# Patient Record
Sex: MALE | Race: WHITE | NOT HISPANIC OR LATINO | Employment: OTHER | URBAN - METROPOLITAN AREA
[De-identification: names, ages, dates, MRNs, and addresses within clinical notes are randomized per-mention and may not be internally consistent; named-entity substitution may affect disease eponyms.]

---

## 2020-07-20 ENCOUNTER — OFFICE VISIT (OUTPATIENT)
Dept: ORTHOPEDIC SURGERY | Facility: CLINIC | Age: 85
End: 2020-07-20

## 2020-07-20 VITALS — HEIGHT: 69 IN | BODY MASS INDEX: 26.36 KG/M2 | WEIGHT: 178 LBS | TEMPERATURE: 97.8 F

## 2020-07-20 DIAGNOSIS — M19.019 SHOULDER ARTHRITIS: ICD-10-CM

## 2020-07-20 DIAGNOSIS — M25.511 BILATERAL SHOULDER PAIN, UNSPECIFIED CHRONICITY: Primary | ICD-10-CM

## 2020-07-20 DIAGNOSIS — M25.512 BILATERAL SHOULDER PAIN, UNSPECIFIED CHRONICITY: Primary | ICD-10-CM

## 2020-07-20 PROCEDURE — 73030 X-RAY EXAM OF SHOULDER: CPT | Performed by: ORTHOPAEDIC SURGERY

## 2020-07-20 PROCEDURE — 99204 OFFICE O/P NEW MOD 45 MIN: CPT | Performed by: ORTHOPAEDIC SURGERY

## 2020-07-20 RX ORDER — MELOXICAM 15 MG/1
15 TABLET ORAL ONCE
Status: CANCELLED | OUTPATIENT
Start: 2020-09-10 | End: 2020-07-20

## 2020-07-20 RX ORDER — ACETAMINOPHEN 325 MG/1
1000 TABLET ORAL ONCE
Status: CANCELLED | OUTPATIENT
Start: 2020-09-10 | End: 2020-07-20

## 2020-07-20 RX ORDER — CEFAZOLIN SODIUM 2 G/100ML
2 INJECTION, SOLUTION INTRAVENOUS ONCE
Status: CANCELLED | OUTPATIENT
Start: 2020-09-10 | End: 2020-07-20

## 2020-07-20 RX ORDER — PREGABALIN 75 MG/1
150 CAPSULE ORAL ONCE
Status: CANCELLED | OUTPATIENT
Start: 2020-09-10 | End: 2020-07-20

## 2020-07-20 NOTE — PROGRESS NOTES
Patient: Josue Rothman    YOB: 1935    Medical Record Number: [id number]    Chief Complaints:  Bilateral shoulder pain and weakness    History of Present Illness:     85 y.o. male patient who presents with a complaint of bilateral shoulder pain.  Both bother him but the left is worse.  He reports that the symptoms first started 5 or 6 years ago.  He previously saw Dr. Motley.  He had shots which did help transiently.  Pain is moderate, constant and aching.  He reports difficulty with routine daily activities at this point.  The symptoms have been getting progressively worse.  He denies any alleviating factors.  He denies any shooting pain down the arm, distal weakness, numbness or paresthesias.  He comes to me wanting to discuss the option of an arthroplasty.  He has a granddaughter that is a nurse practitioner.  She participated in the evaluation via phone.    Allergies:   Allergies   Allergen Reactions   • Penicillins Rash       Home Medications:    Current Outpatient Medications:   •  atorvastatin (LIPITOR) 40 MG tablet, , Disp: , Rfl:   •  celecoxib (CeleBREX) 200 MG capsule, , Disp: , Rfl:   •  co-enzyme Q-10 30 MG capsule, Take 30 mg by mouth 3 (Three) Times a Day., Disp: , Rfl:   •  Cod Liver Oil capsule, Take  by mouth., Disp: , Rfl:   •  ELIQUIS 5 MG tablet tablet, , Disp: , Rfl:   •  finasteride (PROSCAR) 5 MG tablet, , Disp: , Rfl:   •  gabapentin (NEURONTIN) 300 MG capsule, , Disp: , Rfl:   •  Ibuprofen-diphenhydrAMINE HCl 200-25 MG capsule, Take  by mouth., Disp: , Rfl:     Past Medical History:   Diagnosis Date   • Afib (CMS/HCC) 12/02/2018   • Hyperlipidemia    • Renal disorder        Past Surgical History:   Procedure Laterality Date   • BACK SURGERY  2009    RODS & SCREWS   • HERNIA REPAIR     • KIDNEY STONE SURGERY         Social History     Occupational History   • Not on file   Tobacco Use   • Smoking status: Never Smoker   • Smokeless tobacco: Never Used   Substance and  "Sexual Activity   • Alcohol use: Yes     Alcohol/week: 3.0 standard drinks     Types: 1 Glasses of wine, 1 Cans of beer, 1 Shots of liquor per week     Comment: VERY SELDOM   • Drug use: Defer   • Sexual activity: Defer      Social History     Social History Narrative   • Not on file       History reviewed. No pertinent family history.    Review of Systems:      Constitutional: Denies fever, shaking or chills   Eyes: Denies change in visual acuity   HEENT: Denies nasal congestion or sore throat   Respiratory: Denies cough or shortness of breath   Cardiovascular: Denies chest pain or edema  Endocrine: Denies tremors, palpitations, intolerance of heat or cold, polyuria, polydipsia.  GI: Denies abdominal pain, nausea, vomiting, bloody stools or diarrhea  : Denies frequency, urgency, incontinence, retention, or nocturia.  Musculoskeletal: Denies numbness, tingling or loss of motor function except as above  Integument: Denies rash, lesion or ulceration   Neurologic: Denies headache or focal weakness, deficits  Heme: Denies spontaneous or excessive bleeding, epistaxis, hematuria, melena, fatigue, enlarged or tender lymph nodes.      All other pertinent positives and negatives as noted above in HPI.    Physical Exam:   85 y.o. male  Vitals:    07/20/20 1104   Temp: 97.8 °F (36.6 °C)   Weight: 80.7 kg (178 lb)   Height: 175.2 cm (68.98\")     General:  Patient is awake and alert.  Appears in no acute distress or discomfort.    Psych:  Affect and demeanor are appropriate.    Eyes:  Conjunctiva and sclera appear grossly normal.  Eyes track well and EOM seem to be intact.    Ears:  No gross abnormalities.  Hearing adequate for the exam.    Cardiovascular:  Regular rate and rhythm.    Lungs:  Good chest expansion.  Breathing unlabored.    Spine:  Neck appears grossly normal.  No palpable masses or adenopathy.  Good motion.  Spurling's maneuver is negative for any shoulder or arm symptoms.    Extremities:  Left shoulder is " examined.  Skin is benign.  No obvious gross abnormalities.  No palpable masses or adenopathy.  Moderate tenderness noted over anterior glenohumeral joint and rotator interval.  Motion is limited and uncomfortable.   He has crepitus with range of motion.  When I raise the arm up for him, he can briefly maintain it.  His passive motion is not quite full but is much better than his active motion.  No instability.  4 out of 5 strength with resistive testing of elevation in the scapular plane and external rotation.  Negative Hornblower's.  Good motor function in the lower arm and hand including wrist flexion, extension,  and pinch.  Intact sensation.  Palpable radial pulse.  Brisk capillary refill.  Good skin turgor.    Right upper extremity: The right side was only briefly examined.  Skin is benign.  Moderate anterior tenderness.  He has limited motion and pain and weakness with both abduction and elevation.  Motion on the right seems to be a little better than the left but he does have crepitus.         Radiology:  AP, scapular Y, and axillary views of both shoulders are ordered by myself and reviewed to evaluate the patient's complaint.  No comparison films are immediately available.  The x-rays show severe bilateral rotator cuff tear arthropathy on the right with an absent acromiohumeral interval.  He has advanced rotator cuff tear arthropathy on the left as well.  On the left, he appears to have superior glenoid erosion.    Assessment/Plan:  Bilateral shoulder rotator cuff tear arthropathy    We discussed treatment options in detail.  I had a long conversation with both him and his granddaughter.  We discussed continued conservative care versus an arthroplasty.  He is of the mindset that he wants to go ahead and pursue the replacement.  The risk, benefits and alternatives were discussed.  He is taking a trip to the [place] in the near future.  He wants to get his shoulder replaced when he comes back.  We  should be able to work with his schedule.  He will need to get medical clearance preoperatively and permission to come off of the Eliquis.  We will see him back for a preoperative visit.    Jayden Holbrook MD    07/20/2020    CC to Fredo Mevlin MD

## 2020-07-21 ENCOUNTER — TELEPHONE (OUTPATIENT)
Dept: ORTHOPEDIC SURGERY | Facility: CLINIC | Age: 85
End: 2020-07-21

## 2020-07-21 NOTE — TELEPHONE ENCOUNTER
Have spoke with the patient.  He is point to be scheduled for a reverse total shoulder arthroplasty in August.  Does have a history of A. fib which was diagnosed in [facility].  Patient has been on Eliquis since 2018.  He sees Dr. Burnett with [facility] and was last seen in October of last year and was stable at that time and told to come back in a year.  He just saw his primary care physician last week for his annual exam.  Patient reports that Dr. Melvin is the one who refills his Eliquis.  I have contacted his PCPs office and asked him to check with Dr. Melvin to see if the patient is okay to proceed with surgery and if okay to discontinue the Eliquis and for how long before the surgery.  I will get back with the patient once I have heard from Dr. Vanessa office

## 2020-07-21 NOTE — TELEPHONE ENCOUNTER
----- Message from Jayden Holbrook MD sent at 7/20/2020 12:02 PM EDT -----  He wants to get scheduled for a reverse arthroplasty.  I am going to have [facility] call him but he will need to get medical clearance and permission to come off of his Eliquis.  Can you call him about getting clearance?  Thanks.

## 2020-07-31 ENCOUNTER — TELEPHONE (OUTPATIENT)
Dept: ORTHOPEDIC SURGERY | Facility: CLINIC | Age: 85
End: 2020-07-31

## 2020-07-31 NOTE — TELEPHONE ENCOUNTER
Have notified the patient that we have received medical clearance from his PCP.  Letter was also sent to the patient.  It says that he is to stop his Eliquis 24 to 48 hours prior to surgery.  I did advise the patient that Dr. Holbrook would prefer that he hold the Eliquis 48 hours prior to surgery.

## 2020-07-31 NOTE — PROGRESS NOTES
It looks like he is scheduled to see another surgeon for his shoulder.  Does he want to wait on scheduling the surgery?

## 2020-08-03 ENCOUNTER — TELEPHONE (OUTPATIENT)
Dept: ORTHOPEDIC SURGERY | Facility: CLINIC | Age: 85
End: 2020-08-03

## 2020-08-03 NOTE — TELEPHONE ENCOUNTER
Have spoken with the patient. Have discussed with him about the appt with Dr. Jiang.  Patient states that he has cancelled that appt and wants [facility] to do his surgery. Patient is ready to schedule at next available time.  AMB

## 2020-08-03 NOTE — TELEPHONE ENCOUNTER
----- Message from Jayden Holbrook MD sent at 7/31/2020  2:18 PM EDT -----      ----- Message -----  From: Kassy Torres RN  Sent: 7/31/2020   1:48 PM EDT  To: Jayden Holbrook MD    Here is medical clearance for surgery

## 2020-08-04 ENCOUNTER — TELEPHONE (OUTPATIENT)
Dept: ORTHOPEDIC SURGERY | Facility: CLINIC | Age: 85
End: 2020-08-04

## 2020-08-04 NOTE — TELEPHONE ENCOUNTER
Patient is returning your call he has been out of town and he has a few questions for you. Please advise.cld

## 2020-08-05 NOTE — TELEPHONE ENCOUNTER
Call returned to the patient.  He is wanting to go ahead and get his surgery set up at the next available opening.  Will have him contact the surgery scheduler to set up the surgery and preadmission testing.  Patient does have an appointment with Dr. Holbrook on August 17 and have recommended that he go ahead and keep that appointment to discuss the surgery in more detail unless his surgery is scheduled before that appointment time

## 2020-08-17 ENCOUNTER — OFFICE VISIT (OUTPATIENT)
Dept: ORTHOPEDIC SURGERY | Facility: CLINIC | Age: 85
End: 2020-08-17

## 2020-08-17 VITALS — HEIGHT: 69 IN | BODY MASS INDEX: 26.81 KG/M2 | WEIGHT: 181 LBS | TEMPERATURE: 97.8 F

## 2020-08-17 DIAGNOSIS — Z01.818 PRE-OP EVALUATION: Primary | ICD-10-CM

## 2020-08-17 PROBLEM — M19.019 SHOULDER ARTHRITIS: Status: ACTIVE | Noted: 2020-08-17

## 2020-08-17 PROCEDURE — S0260 H&P FOR SURGERY: HCPCS | Performed by: ORTHOPAEDIC SURGERY

## 2020-08-17 RX ORDER — DILTIAZEM HYDROCHLORIDE 120 MG/1
120 CAPSULE, EXTENDED RELEASE ORAL EVERY MORNING
COMMUNITY
Start: 2020-05-18

## 2020-08-17 RX ORDER — TAMSULOSIN HYDROCHLORIDE 0.4 MG/1
1 CAPSULE ORAL DAILY
COMMUNITY
Start: 2020-05-26

## 2020-08-17 NOTE — PROGRESS NOTES
History & Physical         Patient: Josue Rothman    YOB: 1935    Medical Record Number: [id number]    Chief Complaints:   Chief Complaint   Patient presents with   • Left Shoulder - Follow-up     History of Present Illness: 85 y.o. male comes in today of upcoming shoulder replacement surgery. Reports a long history of progressively worsening pain, motion loss, and dysfunction.  Describes current pain as severe.  Has failed prolonged conservative treatment.  Denies any new complaints or issues.    Allergies:   Allergies   Allergen Reactions   • Penicillins Rash       Medications:   Home Medications:    Current Outpatient Medications:   •  atorvastatin (LIPITOR) 40 MG tablet, , Disp: , Rfl:   •  celecoxib (CeleBREX) 200 MG capsule, , Disp: , Rfl:   •  co-enzyme Q-10 30 MG capsule, Take 30 mg by mouth 3 (Three) Times a Day., Disp: , Rfl:   •  Cod Liver Oil capsule, Take  by mouth., Disp: , Rfl:   •  dilTIAZem XR (Dilt-XR) 120 MG 24 hr capsule, TAKE 1 CAPSULE EVERY DAY, Disp: , Rfl:   •  ELIQUIS 5 MG tablet tablet, , Disp: , Rfl:   •  finasteride (PROSCAR) 5 MG tablet, , Disp: , Rfl:   •  gabapentin (NEURONTIN) 300 MG capsule, , Disp: , Rfl:   •  Ibuprofen-diphenhydrAMINE HCl 200-25 MG capsule, Take  by mouth., Disp: , Rfl:   •  tamsulosin (FLOMAX) 0.4 MG capsule 24 hr capsule, TAKE 1 CAPSULE AT BEDTIME, Disp: , Rfl:     Past Medical History:   Diagnosis Date   • Afib (CMS/HCC) 12/02/2018   • Hyperlipidemia    • Renal disorder           Past Surgical History:   Procedure Laterality Date   • BACK SURGERY  2009    RODS & SCREWS   • HERNIA REPAIR     • KIDNEY STONE SURGERY            Social History     Occupational History   • Not on file   Tobacco Use   • Smoking status: Never Smoker   • Smokeless tobacco: Never Used   Substance and Sexual Activity   • Alcohol use: Yes     Alcohol/week: 3.0 standard drinks     Types: 1 Glasses of wine, 1 Cans of beer, 1 Shots of liquor per week     Comment: VERY  "SELDOM   • Drug use: Defer   • Sexual activity: Defer      Social History     Social History Narrative   • Not on file        History reviewed. No pertinent family history.    Review of Systems:     Constitutional:  Denies fever, shaking or chills   Eyes:  Denies change in visual acuity   HEENT:  Denies nasal congestion or sore throat   Respiratory:  Denies cough or shortness of breath   Cardiovascular:  Denies chest pain or edema   GI:  Denies abdominal pain, nausea, vomiting, bloody stools or diarrhea   Musculoskeletal:  Denies numbness tingling or loss of motor function except as outlined above in history of present illness.  Integument:  Denies rash, lesion or ulceration   Neurologic:  Denies headache or focal weakness, deficits      All other pertinent positives and negatives as noted above in HPI.    Physical Exam: 85 y.o. male    Vitals:    08/17/20 0815   Temp: 97.8 °F (36.6 °C)   TempSrc: Temporal   Weight: 82.1 kg (181 lb)   Height: 175.3 cm (69\")     General:  Patient is awake and alert.  Appears in no acute distress or discomfort.    Psych:  Affect and demeanor are appropriate.    Eyes:  Conjunctiva and sclera appear grossly normal.  Eyes track well and EOM seem to be intact.    Ears:  No gross abnormalities.  Hearing adequate for the exam.    Cardiovascular:  Regular rate and rhythm.    Lungs:  Good chest expansion.  Breathing unlabored.    Lymph:  No palpable adenopathy about neck or axilla.    Neck:  Supple.  Normal ROM.  Negative Spurling's for shoulder or arm pain.    Left upper extremity:  Skin benign and intact without evidence for swelling, masses or atrophy.  No palpable masses.  Moderate anterior tenderness.  Shoulder motion remains limited and painful.  Passive motion is better than active.  He can briefly maintain his arm in an elevated position when I raise it for him.  No evident instability or apprehension.  Weakness with elevation in the scapular plane and external rotation.  Deltoid " fires on exam.  Good strength in the lower arm and hand.  Intact sensation throughout the arm and hand palpable radial pulse with brisk cap refill.    Diagnostic Tests:  Lab Results   Component Value Date    GLUCOSE 97 05/15/2015    CALCIUM 9.7 01/09/2020     01/09/2020    K 4.6 01/09/2020    CO2 27 01/09/2020     01/09/2020    BUN 19 01/09/2020    CREATININE 0.9 01/09/2020    BCR 22.0 (H) 01/09/2020     Lab Results   Component Value Date    WBC 4.78 05/15/2015    HGB 14.6 05/15/2015    HCT 42.4 05/15/2015    MCV 90.2 05/15/2015     05/15/2015     No results found for: INR, PROTIME    Imaging:  Previous x-rays of the shoulder are reviewed.  He has advanced rotator cuff tear arthropathy.    Assessment:  Left shoulder rotator cuff tear arthropathy    Plan: We had a thorough discussion regarding the risks, benefits and alternatives to an arthroplasty and non-surgical management versus surgery.  I explained that surgical risks include infection, hematoma, hardware related complications including failure of fixation, loosening, fracture, persistent pain and/or loss of motion, iatrogenic nerve and/or blood vessel injury resulting in permanent weakness, numbness or dysfunction, DVT, PE, positioning related neuropraxia, and anesthesia related complications resulting in death.  We discussed the indication for a reverse as opposed to a standard total shoulder and the risks inherent to the reverse including notching, glenoid loosening, instability, and traction related neuropraxia, any of which could result in persistent pain or problems requiring further surgery.  Lastly, we discussed the rehab and all that will be expected of the patient post operatively to ensure an optimal outcome.  The patient voiced understanding of the risks, benefits, and alternative forms of treatment that were discussed and the patient consents to proceed with the reverse arthroplasty.  Denies any history of metal allergy.  Denies  any personal or family history of blood clots.    Jayden Holbrook MD  08/17/20

## 2020-08-31 ENCOUNTER — TRANSCRIBE ORDERS (OUTPATIENT)
Dept: PREADMISSION TESTING | Facility: HOSPITAL | Age: 85
End: 2020-08-31

## 2020-08-31 DIAGNOSIS — Z01.818 OTHER SPECIFIED PRE-OPERATIVE EXAMINATION: Primary | ICD-10-CM

## 2020-09-02 ENCOUNTER — APPOINTMENT (OUTPATIENT)
Dept: PREADMISSION TESTING | Facility: HOSPITAL | Age: 85
End: 2020-09-02

## 2020-09-02 VITALS
BODY MASS INDEX: 26.41 KG/M2 | HEIGHT: 69 IN | SYSTOLIC BLOOD PRESSURE: 155 MMHG | TEMPERATURE: 97.7 F | RESPIRATION RATE: 16 BRPM | WEIGHT: 178.31 LBS | HEART RATE: 61 BPM | OXYGEN SATURATION: 96 % | DIASTOLIC BLOOD PRESSURE: 72 MMHG

## 2020-09-02 DIAGNOSIS — M19.019 SHOULDER ARTHRITIS: ICD-10-CM

## 2020-09-02 LAB
ANION GAP SERPL CALCULATED.3IONS-SCNC: 9 MMOL/L (ref 5–15)
BUN SERPL-MCNC: 15 MG/DL (ref 8–23)
BUN/CREAT SERPL: 17.2 (ref 7–25)
CALCIUM SPEC-SCNC: 9.9 MG/DL (ref 8.6–10.5)
CHLORIDE SERPL-SCNC: 103 MMOL/L (ref 98–107)
CO2 SERPL-SCNC: 27 MMOL/L (ref 22–29)
CREAT SERPL-MCNC: 0.87 MG/DL (ref 0.76–1.27)
DEPRECATED RDW RBC AUTO: 46.6 FL (ref 37–54)
ERYTHROCYTE [DISTWIDTH] IN BLOOD BY AUTOMATED COUNT: 13.8 % (ref 12.3–15.4)
GFR SERPL CREATININE-BSD FRML MDRD: 83 ML/MIN/1.73
GLUCOSE SERPL-MCNC: 99 MG/DL (ref 65–99)
HCT VFR BLD AUTO: 42.2 % (ref 37.5–51)
HGB BLD-MCNC: 14.6 G/DL (ref 13–17.7)
MCH RBC QN AUTO: 31.7 PG (ref 26.6–33)
MCHC RBC AUTO-ENTMCNC: 34.6 G/DL (ref 31.5–35.7)
MCV RBC AUTO: 91.7 FL (ref 79–97)
PLATELET # BLD AUTO: 156 10*3/MM3 (ref 140–450)
PMV BLD AUTO: 9.2 FL (ref 6–12)
POTASSIUM SERPL-SCNC: 4.2 MMOL/L (ref 3.5–5.2)
RBC # BLD AUTO: 4.6 10*6/MM3 (ref 4.14–5.8)
SODIUM SERPL-SCNC: 139 MMOL/L (ref 136–145)
WBC # BLD AUTO: 5.33 10*3/MM3 (ref 3.4–10.8)

## 2020-09-02 PROCEDURE — 93005 ELECTROCARDIOGRAM TRACING: CPT

## 2020-09-02 PROCEDURE — A9270 NON-COVERED ITEM OR SERVICE: HCPCS | Performed by: ORTHOPAEDIC SURGERY

## 2020-09-02 PROCEDURE — 36415 COLL VENOUS BLD VENIPUNCTURE: CPT

## 2020-09-02 PROCEDURE — 85027 COMPLETE CBC AUTOMATED: CPT | Performed by: ORTHOPAEDIC SURGERY

## 2020-09-02 PROCEDURE — 80048 BASIC METABOLIC PNL TOTAL CA: CPT | Performed by: ORTHOPAEDIC SURGERY

## 2020-09-02 PROCEDURE — 63710000001 MUPIROCIN 2 % OINTMENT: Performed by: ORTHOPAEDIC SURGERY

## 2020-09-02 PROCEDURE — 93010 ELECTROCARDIOGRAM REPORT: CPT | Performed by: INTERNAL MEDICINE

## 2020-09-02 RX ORDER — NAPROXEN SODIUM 220 MG
220 TABLET ORAL 2 TIMES DAILY PRN
COMMUNITY
End: 2020-09-11 | Stop reason: HOSPADM

## 2020-09-02 NOTE — DISCHARGE INSTRUCTIONS
Take the following medications the morning of surgery:    DILTIAZEM XR       If you are on prescription narcotic pain medication to control your pain you may also take that medication the morning of surgery.    General Instructions:  • Do not eat solid food after midnight the night before surgery.  • You may drink clear liquids day of surgery but must stop at least one hour before your hospital arrival time. @ 0800 AM STOP DRINKING!!  • It is beneficial for you to have a clear drink that contains carbohydrates the day of surgery.  We suggest a 12 to 20 ounce bottle of Gatorade or Powerade for non-diabetic patients or a 12 to 20 ounce bottle of G2 or Powerade Zero for diabetic patients.     Clear liquids are liquids you can see through.  Nothing red in color.     Plain water                               Sports drinks  Sodas                                   Gelatin (Jell-O)  Fruit juices without pulp such as white grape juice and apple juice  Popsicles that contain no fruit or yogurt  Tea or coffee (no cream or milk added)  Gatorade / Powerade  G2 / Powerade Zero     • Patients who avoid smoking, chewing tobacco and alcohol for 4 weeks prior to surgery have a reduced risk of post-operative complications.  Quit smoking as many days before surgery as you can.  • Do not smoke, use chewing tobacco or drink alcohol the day of surgery.   • If applicable bring your C-PAP/ BI-PAP machine.  • Bring any papers given to you in the doctor’s office.  • Wear clean comfortable clothes.  • Do not wear contact lenses, false eyelashes or make-up.  Bring a case for your glasses.   • Bring crutches or walker if applicable.  • Remove all piercings.  Leave jewelry and any other valuables at home.  • Hair extensions with metal clips must be removed prior to surgery.  • The Pre-Admission Testing nurse will instruct you to bring medications if unable to obtain an accurate list in Pre-Admission Testing.        Preventing a Surgical Site  Infection:  • For 2 to 3 days before surgery, avoid shaving with a razor because the razor can irritate skin and make it easier to develop an infection.    • Any areas of open skin can increase the risk of a post-operative wound infection by allowing bacteria to enter and travel throughout the body.  Notify your surgeon if you have any skin wounds / rashes even if it is not near the expected surgical site.  The area will need assessed to determine if surgery should be delayed until it is healed.  • The night prior to surgery shower using a fresh bar of anti-bacterial soap (such as Dial) and clean washcloth.  Sleep in a clean bed with clean clothing.  Do not allow pets to sleep with you.  • Shower on the morning of surgery using a fresh bar of anti-bacterial soap (such as Dial) and clean washcloth.  Dry with a clean towel and dress in clean clothing.  • Ask your surgeon if you will be receiving antibiotics prior to surgery.  • Make sure you, your family, and all healthcare providers clean their hands with soap and water or an alcohol based hand  before caring for you or your wound.    Day of surgery:09- ARRIVE @ 0900 AM REPORT TO THE MAIN OR   Your arrival time is approximately two hours before your scheduled surgery time.  Upon arrival, a Pre-op nurse and Anesthesiologist will review your health history, obtain vital signs, and answer questions you may have.  The only belongings needed at this time will be a list of your home medications and if applicable your C-PAP/BI-PAP machine.  If you are staying overnight your family can leave the rest of your belongings in the car and bring them to your room later.  A Pre-op nurse will start an IV and you may receive medication in preparation for surgery, including something to help you relax.  Your family will be able to see you in the Pre-op area.  Two visitors at a time will be allowed in the Pre-op room.  While you are in surgery your family should notify  the waiting room  if they leave the waiting room area and provide a contact phone number.    Please be aware that surgery does come with discomfort.  We want to make every effort to control your discomfort so please discuss any uncontrolled symptoms with your nurse.   Your doctor will most likely have prescribed pain medications.      If you are going home after surgery you will receive individualized written care instructions before being discharged.  A responsible adult must drive you to and from the hospital on the day of your surgery and stay with you for 24 hours.    If you are staying overnight following surgery, you will be transported to your hospital room following the recovery period.  [facility] has all private rooms.    If you have any questions please call Pre-Admission Testing at [phone].  Deductibles and co-payments are collected on the day of service. Please be prepared to pay the required co-pay, deductible or deposit on the day of service as defined by your plan.    Patient Education for Self-Quarantine Process  09- Tuesday @ 09:50 AM   COVID SCREEN TEST SCHEDULED      Following your COVID testing, we strongly recommend that you do not leave your home after you have been tested for COVID except to get medical care. This includes not going to work, school or to public areas.  If this is not possible for you to do please limit your activities to only required outings.  Be sure to wear a mask when you are with other people, practice social distancing and wash your hands frequently.      The following items provide additional details to keep you safe.  • Wash your hands with soap and water frequently for at least 20 seconds.   • Avoid touching your eyes, nose and mouth with unwashed hands.  • Do not share anything - utensils, towels, food from the same bowl.   • Have your own utensils, drinking glass, dishes, towels and bedding.   • Do not have visitors.   • Do  use FaceTime to stay in touch with family and friends.  • You should stay in a specific room away from others if possible.   • Stay at least 6 feet away from others in the home if you cannot have a dedicated room to yourself.   • Do not snuggle with your pet. While the CDC says there is no evidence that pets can spread COVID-19 or be infected from humans, it is probably best to avoid “petting, snuggling, being kissed or licked and sharing food (during self-quarantine)”, according to the CDC.   • Sanitize household surfaces daily. Include all high touch areas (door handles, light switches, phones, countertops, etc.)  • Do not share a bathroom with others, if possible.   • Wear a mask around others in your home if you are unable to stay in a separate room or 6 feet apart. If  you are unable to wear a mask, have your family member wear a mask if they must be within 6 feet of you.       Call your surgeon immediately if you experience any of the following symptoms:  • Sore Throat  • Shortness of Breath or difficulty breathing  • Cough  • Chills  • Body soreness or muscle pain  • Headache  • Fever  • New loss of taste or smell  • Do not arrive for your surgery ill.  Your procedure will need to be rescheduled to another time.  You will need to call your physician before the day of surgery to avoid any unnecessary exposure to hospital staff as well as other patients.        CHLORHEXIDINE CLOTH INSTRUCTIONS 09- AM PRIOR TO SURGERY   The morning of surgery follow these instructions using the Chlorhexidine cloths you've been given.  These steps reduce bacteria on the body.  Do not use the cloths near your eyes, ears mouth, genitalia or on open wounds.  Throw the cloths away after use but do not try to flush them down a toilet.      • Open and remove one cloth at a time from the package.    • Leave the cloth unfolded and begin the bathing.  • Massage the skin with the cloths using gentle pressure to remove bacteria.  Do  not scrub harshly.   • Follow the steps below with one 2% CHG cloth per area (6 total cloths).  • One cloth for neck, shoulders and chest.  • One cloth for both arms, hands, fingers and underarms (do underarms last).  • One cloth for the abdomen followed by groin.  • One cloth for right leg and foot including between the toes.  • One cloth for left leg and foot including between the toes.  • The last cloth is to be used for the back of the neck, back and buttocks.    Allow the CHG to air dry 3 minutes on the skin which will give it time to work and decrease the chance of irritation.  The skin may feel sticky until it is dry.  Do not rinse with water or any other liquid or you will lose the beneficial effects of the CHG.  If mild skin irritation occurs, do rinse the skin to remove the CHG.  Report this to the nurse at time of admission.  Do not apply lotions, creams, ointments, deodorants or perfumes after using the clothes. Dress in clean clothes before coming to the hospital.    BACTROBAN NASAL OINTMENT  There are many germs normally in your nose. Bactroban is an ointment that will help reduce these germs. Please follow these instructions for Bactroban use:      ____The day before surgery in the morning  [id number]_______    ____The day before surgery in the evening              [id number]_______    ____The day of surgery in the morning    [id number]_______    **Squirt ½ package of Bactroban Ointment onto a cotton applicator and apply to inside of 1st nostril.  Squirt the remaining Bactroban and apply to the inside of the other nostril.

## 2020-09-08 ENCOUNTER — APPOINTMENT (OUTPATIENT)
Dept: LAB | Facility: HOSPITAL | Age: 85
End: 2020-09-08

## 2020-09-08 ENCOUNTER — LAB (OUTPATIENT)
Dept: LAB | Facility: HOSPITAL | Age: 85
End: 2020-09-08

## 2020-09-08 DIAGNOSIS — Z01.818 OTHER SPECIFIED PRE-OPERATIVE EXAMINATION: ICD-10-CM

## 2020-09-08 PROCEDURE — C9803 HOPD COVID-19 SPEC COLLECT: HCPCS

## 2020-09-08 PROCEDURE — U0004 COV-19 TEST NON-CDC HGH THRU: HCPCS

## 2020-09-09 ENCOUNTER — OFFICE VISIT (OUTPATIENT)
Dept: ORTHOPEDIC SURGERY | Facility: CLINIC | Age: 85
End: 2020-09-09

## 2020-09-09 VITALS — HEIGHT: 69 IN | BODY MASS INDEX: 26.51 KG/M2 | TEMPERATURE: 98.7 F | WEIGHT: 179 LBS

## 2020-09-09 DIAGNOSIS — M75.102 ROTATOR CUFF TEAR ARTHROPATHY OF LEFT SHOULDER: ICD-10-CM

## 2020-09-09 DIAGNOSIS — M12.812 ROTATOR CUFF TEAR ARTHROPATHY OF LEFT SHOULDER: ICD-10-CM

## 2020-09-09 DIAGNOSIS — Z01.818 PRE-OP EVALUATION: Primary | ICD-10-CM

## 2020-09-09 LAB — SARS-COV-2 RNA RESP QL NAA+PROBE: NOT DETECTED

## 2020-09-09 PROCEDURE — S0260 H&P FOR SURGERY: HCPCS | Performed by: NURSE PRACTITIONER

## 2020-09-09 NOTE — PROGRESS NOTES
History & Physical         Patient: Josue Rothman    YOB: 1935    Medical Record Number: [id number]    Chief Complaints:   Chief Complaint   Patient presents with   • Left Shoulder - Pre-op Exam     History of Present Illness: 85 y.o. male comes in today of upcoming shoulder replacement surgery. Reports a long history of progressively worsening pain, motion loss, and dysfunction.  Describes current pain as severe.  Has failed prolonged conservative treatment.  Denies any new complaints or issues.    Allergies:   Allergies   Allergen Reactions   • Penicillins Rash       Medications:   Home Medications:    Current Outpatient Medications:   •  atorvastatin (LIPITOR) 40 MG tablet, Take 20 mg by mouth Daily., Disp: , Rfl:   •  dilTIAZem XR (Dilt-XR) 120 MG 24 hr capsule, Take 120 mg by mouth 2 (two) times a day., Disp: , Rfl:   •  finasteride (PROSCAR) 5 MG tablet, Take 5 mg by mouth Daily., Disp: , Rfl:   •  gabapentin (NEURONTIN) 300 MG capsule, Take 300 mg by mouth Every Evening., Disp: , Rfl:   •  tamsulosin (FLOMAX) 0.4 MG capsule 24 hr capsule, Take 1 capsule by mouth Every Other Day., Disp: , Rfl:   •  celecoxib (CeleBREX) 200 MG capsule, Take 200 mg by mouth Daily., Disp: , Rfl:   •  CHLORHEXIDINE GLUCONATE CLOTH EX, Apply  topically. AS DIRECTED: ON WRITTEN SHEET PROVIDED, Disp: , Rfl:   •  co-enzyme Q-10 30 MG capsule, Take 30 mg by mouth Daily. HOLD PRIOR TO SURGERY, Disp: , Rfl:   •  Cod Liver Oil capsule, Take  by mouth Daily. HOLD PRIOR TO SURGERY, Disp: , Rfl:   •  ELIQUIS 5 MG tablet tablet, Take 5 mg by mouth Every Evening. HOLD PRIOR TO SURGERY 09-, Disp: , Rfl:   •  mupirocin (BACTROBAN) 2 % nasal ointment, into the nostril(s) as directed by provider. AS DIRECTED: AM & PM DAY BEFORE SURGERY AM DAY OF SURGERY, Disp: , Rfl:   •  naproxen sodium (ALEVE) 220 MG tablet, Take 220 mg by mouth 2 (Two) Times a Day As Needed. HOLD PRIOR TO SURGERY, Disp: , Rfl:   No current  "facility-administered medications for this visit.     Facility-Administered Medications Ordered in Other Visits:   •  mupirocin (BACTROBAN) 2 % nasal ointment 3 application, 3 application, Nasal, BID, Jayden Holbrook MD    Past Medical History:   Diagnosis Date   • Afib (CMS/[facility]) 12/02/2018   • Arthritis     OSTEO   • Elevated cholesterol    • Hyperlipidemia    • Left shoulder pain    • Limited range of motion (ROM) of shoulder    • Renal disorder           Past Surgical History:   Procedure Laterality Date   • BACK SURGERY  2009    RODS & SCREWS   • HERNIA REPAIR     • KIDNEY STONE SURGERY            Social History     Occupational History   • Not on file   Tobacco Use   • Smoking status: Never Smoker   • Smokeless tobacco: Never Used   Substance and Sexual Activity   • Alcohol use: Yes     Alcohol/week: 3.0 standard drinks     Types: 1 Glasses of wine, 1 Cans of beer, 1 Shots of liquor per week     Comment: VERY SELDOM   • Drug use: Defer   • Sexual activity: Defer      Social History     Social History Narrative   • Not on file          Family History   Problem Relation Age of Onset   • Malig Hyperthermia Neg Hx        Review of Systems:     Constitutional:  Denies fever, shaking or chills   Eyes:  Denies change in visual acuity   HEENT:  Denies nasal congestion or sore throat   Respiratory:  Denies cough or shortness of breath   Cardiovascular:  Denies chest pain or edema   GI:  Denies abdominal pain, nausea, vomiting, bloody stools or diarrhea   Musculoskeletal:  Denies numbness tingling or loss of motor function except as outlined above in history of present illness.  Integument:  Denies rash, lesion or ulceration   Neurologic:  Denies headache or focal weakness, deficits      All other pertinent positives and negatives as noted above in HPI.    Physical Exam: 85 y.o. male    Vitals:    09/09/20 0946   Temp: 98.7 °F (37.1 °C)   TempSrc: Temporal   Weight: 81.2 kg (179 lb)   Height: 175.3 cm (69\") "     General:  Patient is awake and alert.  Appears in no acute distress or discomfort.    Psych:  Affect and demeanor are appropriate.    Eyes:  Conjunctiva and sclera appear grossly normal.  Eyes track well and EOM seem to be intact.    Ears:  No gross abnormalities.  Hearing adequate for the exam.    Cardiovascular:  Regular rate and rhythm.    Lungs:  Good chest expansion.  Breathing unlabored.    Lymph:  No palpable adenopathy about neck or axilla.    Neck:  Supple.  Normal ROM.  Negative Spurling's for shoulder or arm pain.    Left upper extremity:  Skin benign and intact without evidence for swelling, masses or atrophy.  No palpable masses.  Moderate anterior tenderness.  Shoulder ROM limited and uncomfortable.  Passive motion is better than active.  He can briefly maintain his arm in an elevated position when I raise it for him.  No evident instability or apprehension.  Weakness with elevation in the scapular plane and external rotation.  Deltoid fires on exam.  Good strength in the lower arm and hand.  Intact sensation throughout the arm and hand palpable radial pulse with brisk cap refill.    Diagnostic Tests:  Lab Results   Component Value Date    GLUCOSE 99 09/02/2020    CALCIUM 9.9 09/02/2020     09/02/2020    K 4.2 09/02/2020    CO2 27.0 09/02/2020     09/02/2020    BUN 15 09/02/2020    CREATININE 0.87 09/02/2020    EGFRIFNONA 83 09/02/2020    BCR 17.2 09/02/2020    ANIONGAP 9.0 09/02/2020     Lab Results   Component Value Date    WBC 5.33 09/02/2020    HGB 14.6 09/02/2020    HCT 42.2 09/02/2020    MCV 91.7 09/02/2020     09/02/2020     No results found for: INR, PROTIME    Imaging:  Previous x-rays of the shoulder are reviewed.  He has advanced rotator cuff tear arthropathy.    Assessment:  Left shoulder rotator cuff tear arthropathy    Plan: We had a thorough discussion regarding the risks, benefits and alternatives to an arthroplasty and non-surgical management versus surgery.  I  explained that surgical risks include infection, hematoma, hardware related complications including failure of fixation, loosening, fracture, persistent pain and/or loss of motion, iatrogenic nerve and/or blood vessel injury resulting in permanent weakness, numbness or dysfunction, DVT, PE, positioning related neuropraxia, and anesthesia related complications resulting in death.  We discussed the indication for a reverse as opposed to a standard total shoulder and the risks inherent to the reverse including notching, glenoid loosening, instability, and traction related neuropraxia, any of which could result in persistent pain or problems requiring further surgery.  Lastly, we discussed the rehab and all that will be expected of the patient post operatively to ensure an optimal outcome.  The patient voiced understanding of the risks, benefits, and alternative forms of treatment that were discussed and the patient consents to proceed with the reverse arthroplasty.        Nohemy Perez, NICOLE  09/09/20

## 2020-09-09 NOTE — H&P (VIEW-ONLY)
History & Physical         Patient: Josue Rothman    YOB: 1935    Medical Record Number: [id number]    Chief Complaints:   Chief Complaint   Patient presents with   • Left Shoulder - Pre-op Exam     History of Present Illness: 85 y.o. male comes in today of upcoming shoulder replacement surgery. Reports a long history of progressively worsening pain, motion loss, and dysfunction.  Describes current pain as severe.  Has failed prolonged conservative treatment.  Denies any new complaints or issues.    Allergies:   Allergies   Allergen Reactions   • Penicillins Rash       Medications:   Home Medications:    Current Outpatient Medications:   •  atorvastatin (LIPITOR) 40 MG tablet, Take 20 mg by mouth Daily., Disp: , Rfl:   •  dilTIAZem XR (Dilt-XR) 120 MG 24 hr capsule, Take 120 mg by mouth 2 (two) times a day., Disp: , Rfl:   •  finasteride (PROSCAR) 5 MG tablet, Take 5 mg by mouth Daily., Disp: , Rfl:   •  gabapentin (NEURONTIN) 300 MG capsule, Take 300 mg by mouth Every Evening., Disp: , Rfl:   •  tamsulosin (FLOMAX) 0.4 MG capsule 24 hr capsule, Take 1 capsule by mouth Every Other Day., Disp: , Rfl:   •  celecoxib (CeleBREX) 200 MG capsule, Take 200 mg by mouth Daily., Disp: , Rfl:   •  CHLORHEXIDINE GLUCONATE CLOTH EX, Apply  topically. AS DIRECTED: ON WRITTEN SHEET PROVIDED, Disp: , Rfl:   •  co-enzyme Q-10 30 MG capsule, Take 30 mg by mouth Daily. HOLD PRIOR TO SURGERY, Disp: , Rfl:   •  Cod Liver Oil capsule, Take  by mouth Daily. HOLD PRIOR TO SURGERY, Disp: , Rfl:   •  ELIQUIS 5 MG tablet tablet, Take 5 mg by mouth Every Evening. HOLD PRIOR TO SURGERY 09-, Disp: , Rfl:   •  mupirocin (BACTROBAN) 2 % nasal ointment, into the nostril(s) as directed by provider. AS DIRECTED: AM & PM DAY BEFORE SURGERY AM DAY OF SURGERY, Disp: , Rfl:   •  naproxen sodium (ALEVE) 220 MG tablet, Take 220 mg by mouth 2 (Two) Times a Day As Needed. HOLD PRIOR TO SURGERY, Disp: , Rfl:   No current  "facility-administered medications for this visit.     Facility-Administered Medications Ordered in Other Visits:   •  mupirocin (BACTROBAN) 2 % nasal ointment 3 application, 3 application, Nasal, BID, Jayden Holbrook MD    Past Medical History:   Diagnosis Date   • Afib (CMS/[facility]) 12/02/2018   • Arthritis     OSTEO   • Elevated cholesterol    • Hyperlipidemia    • Left shoulder pain    • Limited range of motion (ROM) of shoulder    • Renal disorder           Past Surgical History:   Procedure Laterality Date   • BACK SURGERY  2009    RODS & SCREWS   • HERNIA REPAIR     • KIDNEY STONE SURGERY            Social History     Occupational History   • Not on file   Tobacco Use   • Smoking status: Never Smoker   • Smokeless tobacco: Never Used   Substance and Sexual Activity   • Alcohol use: Yes     Alcohol/week: 3.0 standard drinks     Types: 1 Glasses of wine, 1 Cans of beer, 1 Shots of liquor per week     Comment: VERY SELDOM   • Drug use: Defer   • Sexual activity: Defer      Social History     Social History Narrative   • Not on file          Family History   Problem Relation Age of Onset   • Malig Hyperthermia Neg Hx        Review of Systems:     Constitutional:  Denies fever, shaking or chills   Eyes:  Denies change in visual acuity   HEENT:  Denies nasal congestion or sore throat   Respiratory:  Denies cough or shortness of breath   Cardiovascular:  Denies chest pain or edema   GI:  Denies abdominal pain, nausea, vomiting, bloody stools or diarrhea   Musculoskeletal:  Denies numbness tingling or loss of motor function except as outlined above in history of present illness.  Integument:  Denies rash, lesion or ulceration   Neurologic:  Denies headache or focal weakness, deficits      All other pertinent positives and negatives as noted above in HPI.    Physical Exam: 85 y.o. male    Vitals:    09/09/20 0946   Temp: 98.7 °F (37.1 °C)   TempSrc: Temporal   Weight: 81.2 kg (179 lb)   Height: 175.3 cm (69\") "     General:  Patient is awake and alert.  Appears in no acute distress or discomfort.    Psych:  Affect and demeanor are appropriate.    Eyes:  Conjunctiva and sclera appear grossly normal.  Eyes track well and EOM seem to be intact.    Ears:  No gross abnormalities.  Hearing adequate for the exam.    Cardiovascular:  Regular rate and rhythm.    Lungs:  Good chest expansion.  Breathing unlabored.    Lymph:  No palpable adenopathy about neck or axilla.    Neck:  Supple.  Normal ROM.  Negative Spurling's for shoulder or arm pain.    Left upper extremity:  Skin benign and intact without evidence for swelling, masses or atrophy.  No palpable masses.  Moderate anterior tenderness.  Shoulder ROM limited and uncomfortable.  Passive motion is better than active.  He can briefly maintain his arm in an elevated position when I raise it for him.  No evident instability or apprehension.  Weakness with elevation in the scapular plane and external rotation.  Deltoid fires on exam.  Good strength in the lower arm and hand.  Intact sensation throughout the arm and hand palpable radial pulse with brisk cap refill.    Diagnostic Tests:  Lab Results   Component Value Date    GLUCOSE 99 09/02/2020    CALCIUM 9.9 09/02/2020     09/02/2020    K 4.2 09/02/2020    CO2 27.0 09/02/2020     09/02/2020    BUN 15 09/02/2020    CREATININE 0.87 09/02/2020    EGFRIFNONA 83 09/02/2020    BCR 17.2 09/02/2020    ANIONGAP 9.0 09/02/2020     Lab Results   Component Value Date    WBC 5.33 09/02/2020    HGB 14.6 09/02/2020    HCT 42.2 09/02/2020    MCV 91.7 09/02/2020     09/02/2020     No results found for: INR, PROTIME    Imaging:  Previous x-rays of the shoulder are reviewed.  He has advanced rotator cuff tear arthropathy.    Assessment:  Left shoulder rotator cuff tear arthropathy    Plan: We had a thorough discussion regarding the risks, benefits and alternatives to an arthroplasty and non-surgical management versus surgery.  I  explained that surgical risks include infection, hematoma, hardware related complications including failure of fixation, loosening, fracture, persistent pain and/or loss of motion, iatrogenic nerve and/or blood vessel injury resulting in permanent weakness, numbness or dysfunction, DVT, PE, positioning related neuropraxia, and anesthesia related complications resulting in death.  We discussed the indication for a reverse as opposed to a standard total shoulder and the risks inherent to the reverse including notching, glenoid loosening, instability, and traction related neuropraxia, any of which could result in persistent pain or problems requiring further surgery.  Lastly, we discussed the rehab and all that will be expected of the patient post operatively to ensure an optimal outcome.  The patient voiced understanding of the risks, benefits, and alternative forms of treatment that were discussed and the patient consents to proceed with the reverse arthroplasty.        Nohemy Perez, NICOLE  09/09/20

## 2020-09-10 ENCOUNTER — APPOINTMENT (OUTPATIENT)
Dept: GENERAL RADIOLOGY | Facility: HOSPITAL | Age: 85
End: 2020-09-10

## 2020-09-10 ENCOUNTER — ANESTHESIA EVENT (OUTPATIENT)
Dept: PERIOP | Facility: HOSPITAL | Age: 85
End: 2020-09-10

## 2020-09-10 ENCOUNTER — HOSPITAL ENCOUNTER (INPATIENT)
Facility: HOSPITAL | Age: 85
LOS: 1 days | Discharge: HOME | End: 2020-09-11
Attending: ORTHOPAEDIC SURGERY | Admitting: ORTHOPAEDIC SURGERY

## 2020-09-10 ENCOUNTER — ANESTHESIA (OUTPATIENT)
Dept: PERIOP | Facility: HOSPITAL | Age: 85
End: 2020-09-10

## 2020-09-10 DIAGNOSIS — Z96.612 STATUS POST REVERSE TOTAL REPLACEMENT OF LEFT SHOULDER: Primary | ICD-10-CM

## 2020-09-10 DIAGNOSIS — M19.019 SHOULDER ARTHRITIS: ICD-10-CM

## 2020-09-10 PROCEDURE — 76942 ECHO GUIDE FOR BIOPSY: CPT | Performed by: ORTHOPAEDIC SURGERY

## 2020-09-10 PROCEDURE — 25010000002 ONDANSETRON PER 1 MG: Performed by: NURSE ANESTHETIST, CERTIFIED REGISTERED

## 2020-09-10 PROCEDURE — C1776 JOINT DEVICE (IMPLANTABLE): HCPCS | Performed by: ORTHOPAEDIC SURGERY

## 2020-09-10 PROCEDURE — C1713 ANCHOR/SCREW BN/BN,TIS/BN: HCPCS | Performed by: ORTHOPAEDIC SURGERY

## 2020-09-10 PROCEDURE — 25010000003 CEFAZOLIN IN DEXTROSE 2-4 GM/100ML-% SOLUTION: Performed by: ORTHOPAEDIC SURGERY

## 2020-09-10 PROCEDURE — 25010000002 FENTANYL CITRATE (PF) 100 MCG/2ML SOLUTION: Performed by: ANESTHESIOLOGY

## 2020-09-10 PROCEDURE — 25010000002 PHENYLEPHRINE PER 1 ML: Performed by: NURSE ANESTHETIST, CERTIFIED REGISTERED

## 2020-09-10 PROCEDURE — 23472 RECONSTRUCT SHOULDER JOINT: CPT | Performed by: ORTHOPAEDIC SURGERY

## 2020-09-10 PROCEDURE — 25010000002 NEOSTIGMINE PER 0.5 MG: Performed by: NURSE ANESTHETIST, CERTIFIED REGISTERED

## 2020-09-10 PROCEDURE — 25010000002 PROPOFOL 10 MG/ML EMULSION: Performed by: NURSE ANESTHETIST, CERTIFIED REGISTERED

## 2020-09-10 PROCEDURE — 0RRK00Z REPLACEMENT OF LEFT SHOULDER JOINT WITH REVERSE BALL AND SOCKET SYNTHETIC SUBSTITUTE, OPEN APPROACH: ICD-10-PCS | Performed by: ORTHOPAEDIC SURGERY

## 2020-09-10 PROCEDURE — 73020 X-RAY EXAM OF SHOULDER: CPT

## 2020-09-10 PROCEDURE — 25010000002 VANCOMYCIN 10 G RECONSTITUTED SOLUTION: Performed by: ORTHOPAEDIC SURGERY

## 2020-09-10 PROCEDURE — 25010000002 MIDAZOLAM PER 1 MG: Performed by: ANESTHESIOLOGY

## 2020-09-10 PROCEDURE — 25010000002 ROPIVACAINE PER 1 MG: Performed by: ANESTHESIOLOGY

## 2020-09-10 DEVICE — STEM HUM/SHLDR COMPREHENSIVE MINI 16X83MM: Type: IMPLANTABLE DEVICE | Site: SHOULDER | Status: FUNCTIONAL

## 2020-09-10 DEVICE — SCRW FIX LK HEX 4.75X25MM: Type: IMPLANTABLE DEVICE | Site: SHOULDER | Status: FUNCTIONAL

## 2020-09-10 DEVICE — SCRW FIX LK HEX 4.75X20MM: Type: IMPLANTABLE DEVICE | Site: SHOULDER | Status: FUNCTIONAL

## 2020-09-10 DEVICE — DEV CONTRL TISS STRATAFIX SPIRAL MNCRYL UD 3/0 PLS 30CM: Type: IMPLANTABLE DEVICE | Site: SHOULDER | Status: FUNCTIONAL

## 2020-09-10 DEVICE — BASE PLT AUG W/ADAPT MD: Type: IMPLANTABLE DEVICE | Site: SHOULDER | Status: FUNCTIONAL

## 2020-09-10 DEVICE — TRY HUM/SHLDR COMPREHENSIVE/REVERSE MINI COCR STD 40MM: Type: IMPLANTABLE DEVICE | Site: SHOULDER | Status: FUNCTIONAL

## 2020-09-10 DEVICE — TOTL SHLDR AUG PLT ARTHROPLASTY UPCHRG ZIMMERBIOMET: Type: IMPLANTABLE DEVICE | Site: SHOULDER | Status: FUNCTIONAL

## 2020-09-10 DEVICE — IMPLANTABLE DEVICE: Type: IMPLANTABLE DEVICE | Site: SHOULDER | Status: FUNCTIONAL

## 2020-09-10 DEVICE — SCRW FIX LK HEX 4.75X15MM: Type: IMPLANTABLE DEVICE | Site: SHOULDER | Status: FUNCTIONAL

## 2020-09-10 DEVICE — BEAR HUM PROLNG STD 40MM: Type: IMPLANTABLE DEVICE | Site: SHOULDER | Status: FUNCTIONAL

## 2020-09-10 DEVICE — SCRW COMPRNSV CNTRL 6.5X30MM REUS: Type: IMPLANTABLE DEVICE | Site: SHOULDER | Status: FUNCTIONAL

## 2020-09-10 DEVICE — COMP GLEN VERSA/DIAL PLS3 40MM: Type: IMPLANTABLE DEVICE | Site: SHOULDER | Status: FUNCTIONAL

## 2020-09-10 RX ORDER — GABAPENTIN 300 MG/1
300 CAPSULE ORAL EVERY EVENING
Status: DISCONTINUED | OUTPATIENT
Start: 2020-09-10 | End: 2020-09-11 | Stop reason: HOSPADM

## 2020-09-10 RX ORDER — ONDANSETRON 4 MG/1
4 TABLET, FILM COATED ORAL EVERY 6 HOURS PRN
Status: DISCONTINUED | OUTPATIENT
Start: 2020-09-10 | End: 2020-09-11 | Stop reason: HOSPADM

## 2020-09-10 RX ORDER — TRANEXAMIC ACID 100 MG/ML
INJECTION, SOLUTION INTRAVENOUS AS NEEDED
Status: DISCONTINUED | OUTPATIENT
Start: 2020-09-10 | End: 2020-09-10 | Stop reason: SURG

## 2020-09-10 RX ORDER — ACETAMINOPHEN 325 MG/1
650 TABLET ORAL EVERY 4 HOURS PRN
Status: DISCONTINUED | OUTPATIENT
Start: 2020-09-10 | End: 2020-09-11 | Stop reason: HOSPADM

## 2020-09-10 RX ORDER — NALOXONE HCL 0.4 MG/ML
0.2 VIAL (ML) INJECTION AS NEEDED
Status: DISCONTINUED | OUTPATIENT
Start: 2020-09-10 | End: 2020-09-10 | Stop reason: HOSPADM

## 2020-09-10 RX ORDER — FINASTERIDE 5 MG/1
5 TABLET, FILM COATED ORAL DAILY
Status: DISCONTINUED | OUTPATIENT
Start: 2020-09-10 | End: 2020-09-11 | Stop reason: HOSPADM

## 2020-09-10 RX ORDER — FLUMAZENIL 0.1 MG/ML
0.2 INJECTION INTRAVENOUS AS NEEDED
Status: DISCONTINUED | OUTPATIENT
Start: 2020-09-10 | End: 2020-09-10 | Stop reason: HOSPADM

## 2020-09-10 RX ORDER — ACETAMINOPHEN 325 MG/1
1000 TABLET ORAL ONCE
Status: COMPLETED | OUTPATIENT
Start: 2020-09-10 | End: 2020-09-10

## 2020-09-10 RX ORDER — FENTANYL CITRATE 50 UG/ML
50 INJECTION, SOLUTION INTRAMUSCULAR; INTRAVENOUS
Status: DISCONTINUED | OUTPATIENT
Start: 2020-09-10 | End: 2020-09-10 | Stop reason: HOSPADM

## 2020-09-10 RX ORDER — HYDROCODONE BITARTRATE AND ACETAMINOPHEN 7.5; 325 MG/1; MG/1
1 TABLET ORAL EVERY 4 HOURS PRN
Status: DISCONTINUED | OUTPATIENT
Start: 2020-09-10 | End: 2020-09-11 | Stop reason: HOSPADM

## 2020-09-10 RX ORDER — LIDOCAINE HYDROCHLORIDE 10 MG/ML
0.5 INJECTION, SOLUTION EPIDURAL; INFILTRATION; INTRACAUDAL; PERINEURAL ONCE AS NEEDED
Status: DISCONTINUED | OUTPATIENT
Start: 2020-09-10 | End: 2020-09-10 | Stop reason: HOSPADM

## 2020-09-10 RX ORDER — EPHEDRINE SULFATE 50 MG/ML
INJECTION, SOLUTION INTRAVENOUS AS NEEDED
Status: DISCONTINUED | OUTPATIENT
Start: 2020-09-10 | End: 2020-09-10 | Stop reason: SURG

## 2020-09-10 RX ORDER — ASPIRIN 81 MG/1
81 TABLET ORAL
COMMUNITY

## 2020-09-10 RX ORDER — CELECOXIB 200 MG/1
200 CAPSULE ORAL DAILY
Status: DISCONTINUED | OUTPATIENT
Start: 2020-09-10 | End: 2020-09-11 | Stop reason: HOSPADM

## 2020-09-10 RX ORDER — ACETAMINOPHEN 325 MG/1
650 TABLET ORAL ONCE AS NEEDED
Status: DISCONTINUED | OUTPATIENT
Start: 2020-09-10 | End: 2020-09-10 | Stop reason: HOSPADM

## 2020-09-10 RX ORDER — DOCUSATE SODIUM 100 MG/1
100 CAPSULE, LIQUID FILLED ORAL 2 TIMES DAILY PRN
Status: DISCONTINUED | OUTPATIENT
Start: 2020-09-10 | End: 2020-09-11 | Stop reason: HOSPADM

## 2020-09-10 RX ORDER — ROPIVACAINE HYDROCHLORIDE 5 MG/ML
INJECTION, SOLUTION EPIDURAL; INFILTRATION; PERINEURAL
Status: COMPLETED | OUTPATIENT
Start: 2020-09-10 | End: 2020-09-10

## 2020-09-10 RX ORDER — MAGNESIUM HYDROXIDE 1200 MG/15ML
LIQUID ORAL AS NEEDED
Status: DISCONTINUED | OUTPATIENT
Start: 2020-09-10 | End: 2020-09-10 | Stop reason: HOSPADM

## 2020-09-10 RX ORDER — HYDROMORPHONE HYDROCHLORIDE 1 MG/ML
0.5 INJECTION, SOLUTION INTRAMUSCULAR; INTRAVENOUS; SUBCUTANEOUS
Status: DISCONTINUED | OUTPATIENT
Start: 2020-09-10 | End: 2020-09-11 | Stop reason: HOSPADM

## 2020-09-10 RX ORDER — HYDROCODONE BITARTRATE AND ACETAMINOPHEN 7.5; 325 MG/1; MG/1
2 TABLET ORAL EVERY 4 HOURS PRN
Status: DISCONTINUED | OUTPATIENT
Start: 2020-09-10 | End: 2020-09-11 | Stop reason: HOSPADM

## 2020-09-10 RX ORDER — ONDANSETRON 2 MG/ML
INJECTION INTRAMUSCULAR; INTRAVENOUS AS NEEDED
Status: DISCONTINUED | OUTPATIENT
Start: 2020-09-10 | End: 2020-09-10 | Stop reason: SURG

## 2020-09-10 RX ORDER — ATORVASTATIN CALCIUM 20 MG/1
20 TABLET, FILM COATED ORAL NIGHTLY
Status: DISCONTINUED | OUTPATIENT
Start: 2020-09-10 | End: 2020-09-11 | Stop reason: HOSPADM

## 2020-09-10 RX ORDER — SODIUM CHLORIDE, SODIUM LACTATE, POTASSIUM CHLORIDE, CALCIUM CHLORIDE 600; 310; 30; 20 MG/100ML; MG/100ML; MG/100ML; MG/100ML
100 INJECTION, SOLUTION INTRAVENOUS CONTINUOUS
Status: DISCONTINUED | OUTPATIENT
Start: 2020-09-10 | End: 2020-09-11 | Stop reason: HOSPADM

## 2020-09-10 RX ORDER — CEFAZOLIN SODIUM 2 G/100ML
2 INJECTION, SOLUTION INTRAVENOUS ONCE
Status: COMPLETED | OUTPATIENT
Start: 2020-09-10 | End: 2020-09-10

## 2020-09-10 RX ORDER — ROCURONIUM BROMIDE 10 MG/ML
INJECTION, SOLUTION INTRAVENOUS AS NEEDED
Status: DISCONTINUED | OUTPATIENT
Start: 2020-09-10 | End: 2020-09-10 | Stop reason: SURG

## 2020-09-10 RX ORDER — HYDRALAZINE HYDROCHLORIDE 20 MG/ML
5 INJECTION INTRAMUSCULAR; INTRAVENOUS
Status: DISCONTINUED | OUTPATIENT
Start: 2020-09-10 | End: 2020-09-10 | Stop reason: HOSPADM

## 2020-09-10 RX ORDER — HYDROCODONE BITARTRATE AND ACETAMINOPHEN 5; 325 MG/1; MG/1
1 TABLET ORAL ONCE AS NEEDED
Status: DISCONTINUED | OUTPATIENT
Start: 2020-09-10 | End: 2020-09-10 | Stop reason: HOSPADM

## 2020-09-10 RX ORDER — SODIUM CHLORIDE 0.9 % (FLUSH) 0.9 %
10 SYRINGE (ML) INJECTION AS NEEDED
Status: DISCONTINUED | OUTPATIENT
Start: 2020-09-10 | End: 2020-09-11 | Stop reason: HOSPADM

## 2020-09-10 RX ORDER — ONDANSETRON 2 MG/ML
4 INJECTION INTRAMUSCULAR; INTRAVENOUS ONCE AS NEEDED
Status: DISCONTINUED | OUTPATIENT
Start: 2020-09-10 | End: 2020-09-10 | Stop reason: HOSPADM

## 2020-09-10 RX ORDER — SODIUM CHLORIDE 0.9 % (FLUSH) 0.9 %
3 SYRINGE (ML) INJECTION EVERY 12 HOURS SCHEDULED
Status: DISCONTINUED | OUTPATIENT
Start: 2020-09-10 | End: 2020-09-11 | Stop reason: HOSPADM

## 2020-09-10 RX ORDER — SODIUM CHLORIDE, SODIUM LACTATE, POTASSIUM CHLORIDE, CALCIUM CHLORIDE 600; 310; 30; 20 MG/100ML; MG/100ML; MG/100ML; MG/100ML
9 INJECTION, SOLUTION INTRAVENOUS CONTINUOUS
Status: DISCONTINUED | OUTPATIENT
Start: 2020-09-10 | End: 2020-09-10 | Stop reason: HOSPADM

## 2020-09-10 RX ORDER — MELOXICAM 15 MG/1
15 TABLET ORAL DAILY
Status: DISCONTINUED | OUTPATIENT
Start: 2020-09-11 | End: 2020-09-11 | Stop reason: HOSPADM

## 2020-09-10 RX ORDER — PREGABALIN 75 MG/1
150 CAPSULE ORAL ONCE
Status: COMPLETED | OUTPATIENT
Start: 2020-09-10 | End: 2020-09-10

## 2020-09-10 RX ORDER — MIDAZOLAM HYDROCHLORIDE 1 MG/ML
1 INJECTION INTRAMUSCULAR; INTRAVENOUS
Status: DISCONTINUED | OUTPATIENT
Start: 2020-09-10 | End: 2020-09-10 | Stop reason: HOSPADM

## 2020-09-10 RX ORDER — CEFAZOLIN SODIUM 2 G/100ML
2 INJECTION, SOLUTION INTRAVENOUS EVERY 8 HOURS
Status: COMPLETED | OUTPATIENT
Start: 2020-09-10 | End: 2020-09-11

## 2020-09-10 RX ORDER — HYDROMORPHONE HYDROCHLORIDE 1 MG/ML
0.25 INJECTION, SOLUTION INTRAMUSCULAR; INTRAVENOUS; SUBCUTANEOUS
Status: DISCONTINUED | OUTPATIENT
Start: 2020-09-10 | End: 2020-09-10 | Stop reason: HOSPADM

## 2020-09-10 RX ORDER — LABETALOL HYDROCHLORIDE 5 MG/ML
5 INJECTION, SOLUTION INTRAVENOUS
Status: DISCONTINUED | OUTPATIENT
Start: 2020-09-10 | End: 2020-09-10 | Stop reason: HOSPADM

## 2020-09-10 RX ORDER — SODIUM CHLORIDE 0.9 % (FLUSH) 0.9 %
3-10 SYRINGE (ML) INJECTION AS NEEDED
Status: DISCONTINUED | OUTPATIENT
Start: 2020-09-10 | End: 2020-09-10 | Stop reason: HOSPADM

## 2020-09-10 RX ORDER — GLYCOPYRROLATE 0.2 MG/ML
INJECTION INTRAMUSCULAR; INTRAVENOUS AS NEEDED
Status: DISCONTINUED | OUTPATIENT
Start: 2020-09-10 | End: 2020-09-10 | Stop reason: SURG

## 2020-09-10 RX ORDER — ONDANSETRON 2 MG/ML
4 INJECTION INTRAMUSCULAR; INTRAVENOUS EVERY 6 HOURS PRN
Status: DISCONTINUED | OUTPATIENT
Start: 2020-09-10 | End: 2020-09-11 | Stop reason: HOSPADM

## 2020-09-10 RX ORDER — SODIUM CHLORIDE 0.9 % (FLUSH) 0.9 %
3 SYRINGE (ML) INJECTION EVERY 12 HOURS SCHEDULED
Status: DISCONTINUED | OUTPATIENT
Start: 2020-09-10 | End: 2020-09-10 | Stop reason: HOSPADM

## 2020-09-10 RX ORDER — LIDOCAINE HYDROCHLORIDE 20 MG/ML
INJECTION, SOLUTION INFILTRATION; PERINEURAL AS NEEDED
Status: DISCONTINUED | OUTPATIENT
Start: 2020-09-10 | End: 2020-09-10 | Stop reason: SURG

## 2020-09-10 RX ORDER — POLYETHYLENE GLYCOL 3350 17 G/17G
17 POWDER, FOR SOLUTION ORAL DAILY
Status: DISCONTINUED | OUTPATIENT
Start: 2020-09-10 | End: 2020-09-11 | Stop reason: HOSPADM

## 2020-09-10 RX ORDER — NALOXONE HCL 0.4 MG/ML
0.1 VIAL (ML) INJECTION
Status: DISCONTINUED | OUTPATIENT
Start: 2020-09-10 | End: 2020-09-11 | Stop reason: HOSPADM

## 2020-09-10 RX ORDER — DILTIAZEM HYDROCHLORIDE 120 MG/1
120 CAPSULE, COATED, EXTENDED RELEASE ORAL
Status: DISCONTINUED | OUTPATIENT
Start: 2020-09-10 | End: 2020-09-11 | Stop reason: HOSPADM

## 2020-09-10 RX ORDER — PROPOFOL 10 MG/ML
VIAL (ML) INTRAVENOUS AS NEEDED
Status: DISCONTINUED | OUTPATIENT
Start: 2020-09-10 | End: 2020-09-10 | Stop reason: SURG

## 2020-09-10 RX ORDER — TAMSULOSIN HYDROCHLORIDE 0.4 MG/1
0.4 CAPSULE ORAL EVERY OTHER DAY
Status: DISCONTINUED | OUTPATIENT
Start: 2020-09-10 | End: 2020-09-11 | Stop reason: HOSPADM

## 2020-09-10 RX ORDER — MELOXICAM 15 MG/1
15 TABLET ORAL ONCE
Status: COMPLETED | OUTPATIENT
Start: 2020-09-10 | End: 2020-09-10

## 2020-09-10 RX ADMIN — GABAPENTIN 300 MG: 300 CAPSULE ORAL at 22:17

## 2020-09-10 RX ADMIN — CEFAZOLIN SODIUM 2 G: 2 INJECTION, SOLUTION INTRAVENOUS at 09:55

## 2020-09-10 RX ADMIN — ATORVASTATIN CALCIUM 20 MG: 20 TABLET, FILM COATED ORAL at 20:15

## 2020-09-10 RX ADMIN — GLYCOPYRROLATE 0.6 MG: 0.2 INJECTION INTRAMUSCULAR; INTRAVENOUS at 11:06

## 2020-09-10 RX ADMIN — EPHEDRINE SULFATE 5 MG: 50 INJECTION INTRAVENOUS at 10:16

## 2020-09-10 RX ADMIN — CEFAZOLIN SODIUM 2 G: 2 INJECTION, SOLUTION INTRAVENOUS at 18:55

## 2020-09-10 RX ADMIN — SODIUM CHLORIDE, POTASSIUM CHLORIDE, SODIUM LACTATE AND CALCIUM CHLORIDE: 600; 310; 30; 20 INJECTION, SOLUTION INTRAVENOUS at 10:39

## 2020-09-10 RX ADMIN — PREGABALIN 150 MG: 75 CAPSULE ORAL at 08:17

## 2020-09-10 RX ADMIN — ACETAMINOPHEN 975 MG: 325 TABLET, FILM COATED ORAL at 08:17

## 2020-09-10 RX ADMIN — MELOXICAM 15 MG: 15 TABLET ORAL at 08:17

## 2020-09-10 RX ADMIN — TAMSULOSIN HYDROCHLORIDE 0.4 MG: 0.4 CAPSULE ORAL at 15:23

## 2020-09-10 RX ADMIN — SODIUM CHLORIDE, POTASSIUM CHLORIDE, SODIUM LACTATE AND CALCIUM CHLORIDE 9 ML/HR: 600; 310; 30; 20 INJECTION, SOLUTION INTRAVENOUS at 08:21

## 2020-09-10 RX ADMIN — EPHEDRINE SULFATE 10 MG: 50 INJECTION INTRAVENOUS at 10:04

## 2020-09-10 RX ADMIN — ONDANSETRON HYDROCHLORIDE 4 MG: 2 SOLUTION INTRAMUSCULAR; INTRAVENOUS at 10:50

## 2020-09-10 RX ADMIN — TRANEXAMIC ACID 1000 MG: 100 INJECTION, SOLUTION INTRAVENOUS at 09:58

## 2020-09-10 RX ADMIN — ROCURONIUM BROMIDE 30 MG: 10 INJECTION INTRAVENOUS at 09:38

## 2020-09-10 RX ADMIN — SODIUM CHLORIDE, PRESERVATIVE FREE 3 ML: 5 INJECTION INTRAVENOUS at 20:15

## 2020-09-10 RX ADMIN — EPHEDRINE SULFATE 5 MG: 50 INJECTION INTRAVENOUS at 10:54

## 2020-09-10 RX ADMIN — PROPOFOL 140 MG: 10 INJECTION, EMULSION INTRAVENOUS at 09:38

## 2020-09-10 RX ADMIN — VANCOMYCIN HYDROCHLORIDE 1250 MG: 10 INJECTION, POWDER, LYOPHILIZED, FOR SOLUTION INTRAVENOUS at 08:21

## 2020-09-10 RX ADMIN — NEOSTIGMINE METHYLSULFATE 3 MG: 1 INJECTION INTRAMUSCULAR; INTRAVENOUS; SUBCUTANEOUS at 11:06

## 2020-09-10 RX ADMIN — VANCOMYCIN HYDROCHLORIDE 1250 MG: 10 INJECTION, POWDER, LYOPHILIZED, FOR SOLUTION INTRAVENOUS at 20:15

## 2020-09-10 RX ADMIN — MIDAZOLAM 1 MG: 1 INJECTION INTRAMUSCULAR; INTRAVENOUS at 08:50

## 2020-09-10 RX ADMIN — FENTANYL CITRATE 50 MCG: 50 INJECTION, SOLUTION INTRAMUSCULAR; INTRAVENOUS at 08:50

## 2020-09-10 RX ADMIN — PHENYLEPHRINE HYDROCHLORIDE 100 MCG: 10 INJECTION INTRAVENOUS at 09:51

## 2020-09-10 RX ADMIN — FINASTERIDE 5 MG: 5 TABLET, FILM COATED ORAL at 18:11

## 2020-09-10 RX ADMIN — MUPIROCIN 1 APPLICATION: 20 OINTMENT TOPICAL at 20:17

## 2020-09-10 RX ADMIN — PHENYLEPHRINE HYDROCHLORIDE 100 MCG: 10 INJECTION INTRAVENOUS at 09:57

## 2020-09-10 RX ADMIN — POLYETHYLENE GLYCOL 3350 17 G: 17 POWDER, FOR SOLUTION ORAL at 17:36

## 2020-09-10 RX ADMIN — LIDOCAINE HYDROCHLORIDE 60 MG: 20 INJECTION, SOLUTION INFILTRATION; PERINEURAL at 09:38

## 2020-09-10 RX ADMIN — EPHEDRINE SULFATE 10 MG: 50 INJECTION INTRAVENOUS at 10:02

## 2020-09-10 RX ADMIN — EPHEDRINE SULFATE 5 MG: 50 INJECTION INTRAVENOUS at 10:12

## 2020-09-10 RX ADMIN — EPHEDRINE SULFATE 10 MG: 50 INJECTION INTRAVENOUS at 10:27

## 2020-09-10 RX ADMIN — ROPIVACAINE HYDROCHLORIDE 30 ML: 5 INJECTION, SOLUTION EPIDURAL; INFILTRATION; PERINEURAL at 08:55

## 2020-09-10 RX ADMIN — PHENYLEPHRINE HYDROCHLORIDE 100 MCG: 10 INJECTION INTRAVENOUS at 09:47

## 2020-09-10 NOTE — OP NOTE
Orthopaedic Operative Note    Facility: [facility]    Patient: Josue Rothman    Medical Record Number: [id number]    YOB: 1935    Dictating Surgeon: Jayden Holbrook M.D.*    Primary Care Physician: Fredo Melvin MD    Date of Operation: 9/10/2020    Pre-Operative Diagnosis:  Left rotator cuff tear arthropathy    Post-Operative Diagnosis:  Left rotator cuff tear arthropathy    Procedure Performed:    1.  Left reverse total shoulder arthroplasty    2.  Tenodesis of long head of biceps tendon    Surgeon: Jayden Holbrook MD     Assistant: ZOHAIB Mcguire    Anesthesia: Regional followed by Gen.    Complications: None.     Estimated Blood Loss: Less than 50 mL.     Implants: 1.  Biomet size 16 mm mini comprehensive stem  2.  Medium superior augment mini glenoid baseplate with one 6.5 mm central compression screw and 4 peripheral 4.75 mm locking screws  3.  Size 40 mm +3 offset glenosphere  4.  40 mm standard thickness, standard offset tray with standard thickness bearing    Specimens: * No orders in the log *    Brief Operative Indication:  Mr. Rothman had a history of worsening shoulder pain and dysfunction which had been nonresponsive to conservative treatment.  We had a thorough discussion regarding the risks, benefits and alternatives to an arthroplasty and non-surgical management versus surgery.  I explained that surgical risks include infection, hematoma, hardware related complications including failure of fixation, loosening, fracture, persistent pain and/or loss of motion, iatrogenic nerve and/or blood vessel injury resulting in permanent weakness, numbness or dysfunction, DVT, PE, positioning related neuropraxia, and anesthesia related complications resulting in death.  We discussed the indication for a reverse as opposed to a standard total shoulder and the risks inherent to the reverse including notching, glenoid loosening, instability, and traction  related neuropraxia, any of which could result in persistent pain or problems requiring further surgery.  Last, we discussed the rehab and all that will be expected of the patient post operatively to ensure an optimal outcome.  The patient voiced understanding of the risks, benefits, and alternative forms of treatment that were discussed and the patient consented to proceed.    Description of the procedure in detail:  The patient and operative site were identified in the preoperative holding area.  The surgical site was marked.  Adequate regional anesthesia was administered.  The patient was then taken to the operating room.  The patient was placed on the operating table where adequate general anesthesia was administered.  The patient was then repositioned into the modified beachchair position with the head and neck in neutral alignment.  All bony prominences were carefully padded and protected.    The left upper extremity was then prepped and draped in the standard, sterile fashion.  The extremity was cleaned with an alcohol solution.  A Hibiclens scrub was performed and then the extremity was prepped with 2 ChloraPrep preps.  I allowed this to dry for 3 minutes before the draping procedure was carried out.    A timeout was taken and preoperative antibiotics administered.  Transexamic acid was administered at this time as well.  Following this, I began by fashioning an approximately 6 cm incision over the anterior aspect of the shoulder.  This was carried down through the skin and subcutaneous tissues.  Full-thickness medial and lateral skin flaps were developed.  The interval between the deltoid and pectoralis was carefully identified and developed.  The underlying cephalic vein was dissected out and retracted laterally.  This structure was kept protected throughout the case.    The clavipectoral fascia was divided.  The strap muscles were retracted medially.  The biceps groove was identified.  The biceps tendon  was carefully dissected out.  The biceps was released from its attachment to the supraglenoid tubercle using curved Neumann scissors.  The diseased, intra-articular portion of the biceps was removed.  The remaining intact tendon was tenodesed to the pectoralis tendon using multiple max braid sutures which were tied using 6 throw surgeon's knots.    I then directed my attention to the shoulder.  As expected, there was a large, retracted tear of the rotator cuff involving the entirety of the subscapularis, supraspinatus and infraspinatus.  There was some teres minor intact posteriorly but he had torn the entirety of 3 of the 4 cuff tendons.  There was significant erosion of the tuberosity consistent with long-term abutment to the undersurface of the acromion.  There was significant arthrosis of the visible portion of the humeral head with essentially all of the articular cartilage eroded.  There was no purulence, necrosis or evidence for infection.    The periarticular osteophytes were carefully removed with a rongeur.  I then directed my attention to the preparations of the humerus.    The cutting guide for the head cut was inserted.  This was set to 20° of retroversion which I judged off of the forearm.  With the guide in position, I demarcated the cut and then carried out the cut using an oscillating saw.  The cut portion of the bone was removed and taken to the back table for possible bone grafting later in the case, if needed.    Having completed the humeral sided preparations, I then directed my attention to the glenoid.  The axillary nerve was carefully dissected out and identified.  This structure was protected.  Retractors were carefully positioned along the anterior, posterior and inferior glenoid rim.  I carefully exposed the caudal rim of the glenoid using the electrocautery.  The anterior, inferior and posterior glenoid labrum were then carefully debrided and removed along with the remaining biceps stump  superiorly.      He had severe glenoid superior erosion.  In order to correct this, an augment was necessary.  I trialed with the medium and large.  The medium was sufficient to allow for adequate inferior tilt.  The medium augment centering guide for the baseplate was inserted.  The center pin for the baseplate was drilled in the center of the glenoid vault.  I then carefully reamed and prepared the glenoid in typical fashion, taking care to maintain appropriate inferior tilt for proper positioning of the baseplate.    Once I had completed the reaming process and made sure that the reaming was adequate, the baseplate was impacted into position.  This was secured with a single screw central compression screw which got great purchase.  The 4 peripheral locking screws were then placed without complication.  All 4 screws were confirmed to lock into the baseplate.  With the baseplate secured, I examined the remaining glenoid.  I did determine that a 40 mm +3 offset glenosphere would fit best in this case.  The appropriate size implant was selected for use and then impacted.  I took care to make sure that the Smith taper was fully engaged and that the implant was well-seated.  I used a right angle clamp to pass this beneath the implant and pull up.  When doing so, the entire scapula moved.  Once I was satisfied that this was well seated, I then directed my attention back to the humerus.    The humeral sided preparations were carried out in the typical fashion.  I reamed and broached the humerus up to a 16 mm broach which seemed to fit well.  The appropriate size implant was impacted into position, taking care to maintain appropriate retroversion as judged off of the forearm.  The implants seated well.  I then trialed off of the stem.  The 40 mm standard thickness, standard offset trial tray and standard thickness liner seemed to fit best.  This allowed for excellent motion and stability.  The trial component was removed  and then the final component impacted.  Again, I took care to make sure that the Smith taper was fully engaged before reducing it and carrying the shoulder through range of motion.    Again, the shoulder demonstrated excellent motion and stability.  I could fully elevate, abduct and externally rotate the shoulder without any impingement or limitation.  The shoulder demonstrated excellent motion and absolutely no instability.  There was good tension in the periarticular soft tissue structures.  I directed my attention to closure.  Unfortunately, there was no subscapularis to repair.  I then irrigated the wound with 500 cc of a Betadine-containing saline solution.  I then irrigated with 3 L of sterile saline via pulsatile lavage.  I made sure that we had good hemostasis.  A 10 Latvian Hemovac drain was placed.  The wound was sequentially closed in a layered fashion.  Vicryl was used to repair the subcutaneous tissues.  A running subcuticular Monocryl stitch was used to close the skin followed by Dermabond.  Sterile dressings were applied.  The drapes were withdrawn.  The arm was placed in a sling.  The patient was awakened and taken to the recovery room in good condition.    Jayden Holbrook MD  09/10/20

## 2020-09-10 NOTE — BRIEF OP NOTE
TOTAL SHOULDER REVERSE ARTHROPLASTY  Progress Note    Josue Rothman  9/10/2020    Pre-op Diagnosis:   Shoulder arthritis [M19.019]       Post-Op Diagnosis Codes:     * Shoulder arthritis [M19.019]    Procedure/CPT® Codes:        Procedure(s):  TOTAL SHOULDER REVERSE ARTHROPLASTY, biceps tenodesis    Surgeon(s):  Jayden Holbrook MD    Anesthesia: General with Block    Staff:   Circulator: Gail Penny RN  Scrub Person: Lilia Stewart  Vendor Representative: Nj Renee  Assistant: Jakub Wright CSA  Assistant: Jakub Wright CSA      Estimated Blood Loss: 100ml    Urine Voided: * No values recorded between 9/10/2020  9:28 AM and 9/10/2020 10:00 AM *    Specimens:                None          Drains: * No LDAs found *    Findings: See dictation    Complications: None    Assistant: Jakub Wright CSA  was responsible for performing the following activities: Retraction, manipulation of the arm for insertion of the implants, suctioning and irrigation, wound closure and application of the postoperative immobilizer and their skilled assistance was necessary for the success of this case.    Jayden Holbrook MD     Date: 9/10/2020  Time: 1100

## 2020-09-10 NOTE — ANESTHESIA PREPROCEDURE EVALUATION
Anesthesia Evaluation     Patient summary reviewed and Nursing notes reviewed   NPO Solid Status: > 8 hours  NPO Liquid Status: > 2 hours           Airway   Mallampati: II  TM distance: >3 FB  Neck ROM: full  no difficulty expected  Dental - normal exam     Pulmonary - normal exam   Cardiovascular - normal exam    (+) dysrhythmias Atrial Fib, hyperlipidemia,       Neuro/Psych  GI/Hepatic/Renal/Endo      Musculoskeletal     Abdominal  - normal exam   Substance History      OB/GYN          Other   arthritis,                      Anesthesia Plan    ASA 3     general   (GA with ISB)  intravenous induction     Anesthetic plan, all risks, benefits, and alternatives have been provided, discussed and informed consent has been obtained with: patient.

## 2020-09-10 NOTE — ANESTHESIA PROCEDURE NOTES
Airway  Urgency: elective    Date/Time: 9/10/2020 9:44 AM  Airway not difficult    General Information and Staff    Patient location during procedure: OR  Anesthesiologist: Joshua Izquierdo MD  CRNA: Dulce Lucas CRNA    Indications and Patient Condition  Indications for airway management: airway protection    Preoxygenated: yes (pt pre-O2 with 100% O2)  Mask difficulty assessment: 2 - vent by mask + OA or adjuvant +/- NMBA (easy BMV )    Final Airway Details  Final airway type: endotracheal airway      Successful airway: ETT  Cuffed: yes   Successful intubation technique: direct laryngoscopy  Endotracheal tube insertion site: oral  Blade: Hans  Blade size: 4  ETT size (mm): 7.5  Cormack-Lehane Classification: grade I - full view of glottis  Placement verified by: chest auscultation and capnometry   Cuff volume (mL): 7  Measured from: lips  ETT/EBT  to lips (cm): 22  Number of attempts at approach: 1  Assessment: lips, teeth, and gum same as pre-op and atraumatic intubation    Additional Comments  ATOETx1. No change in dentition.

## 2020-09-10 NOTE — PLAN OF CARE
Problem: Patient Care Overview  Goal: Plan of Care Review  Outcome: Ongoing (interventions implemented as appropriate)  Flowsheets (Taken 9/10/2020 6373)  Progress: improving  Plan of Care Reviewed With: patient  Outcome Summary: no complaints of pain-IS block, ambulating handhold assist of 1, voiding per BRP, plan to dc home tomorrow, VSS, NVI-except left arm d/t block, dressing c/d/i, educated on HR monitoring d/t afib and medications

## 2020-09-10 NOTE — ANESTHESIA PROCEDURE NOTES
Peripheral Block      Patient reassessed immediately prior to procedure    Patient location during procedure: holding area  Start time: 9/10/2020 8:50 AM  Stop time: 9/10/2020 8:55 AM  Reason for block: at surgeon's request and post-op pain management  Performed by  Anesthesiologist: Joshua Izquierdo MD  Preanesthetic Checklist  Completed: patient identified, site marked, surgical consent, pre-op evaluation, timeout performed, IV checked, risks and benefits discussed and monitors and equipment checked  Prep:  Pt Position: supine  Sterile barriers:cap and gloves  Prep: ChloraPrep  Patient monitoring: blood pressure monitoring, continuous pulse oximetry and EKG  Procedure  Sedation:yes  Performed under: local infiltration  Guidance:ultrasound guided  ULTRASOUND INTERPRETATION. Using ultrasound guidance a 20 G gauge needle was placed in close proximity to the nerve, at which point, under ultrasound guidance anesthetic was injected in the area of the nerve and spread of the anesthesia was seen on ultrasound in close proximity thereto.  There were no abnormalities seen on ultrasound; a digital image was taken; and the patient tolerated the procedure with no complications. Images:still images obtained, printed/placed on chart    Laterality:left  Block Type:interscalene  Injection Technique:single-shot  Needle Type:long-bevel  Needle Gauge:20 G      Medications Used: ropivacaine (NAROPIN) 0.5 % injection, 30 mL  Med admintered at 9/10/2020 8:55 AM      Post Assessment  Injection Assessment: negative aspiration for heme, no paresthesia on injection and incremental injection  Patient Tolerance:comfortable throughout block  Complications:no

## 2020-09-10 NOTE — ANESTHESIA POSTPROCEDURE EVALUATION
"Patient: Josue Rothman    Procedure Summary     Date:  09/10/20 Room / Location:  [facility] OR [address] [place] [address] MAIN OR    Anesthesia Start:  0935 Anesthesia Stop:  1120    Procedure:  TOTAL SHOULDER REVERSE ARTHROPLASTY (Left Shoulder) Diagnosis:       Shoulder arthritis      (Shoulder arthritis [M19.019])    Surgeon:  Jayden Holbrook MD Provider:  Joshua Izquierdo MD    Anesthesia Type:  general ASA Status:  3          Anesthesia Type: general    Vitals  Vitals Value Taken Time   /80 9/10/2020 12:15 PM   Temp 36.4 °C (97.6 °F) 9/10/2020 11:15 AM   Pulse 85 9/10/2020 12:30 PM   Resp 14 9/10/2020 12:15 PM   SpO2 94 % 9/10/2020 12:30 PM   Vitals shown include unvalidated device data.        Post Anesthesia Care and Evaluation    Patient location during evaluation: bedside  Patient participation: complete - patient participated  Level of consciousness: awake and alert  Pain management: adequate  Airway patency: patent  Anesthetic complications: No anesthetic complications    Cardiovascular status: acceptable  Respiratory status: acceptable  Hydration status: acceptable    Comments: /80   Pulse 83   Temp 36.4 °C (97.6 °F) (Oral)   Resp 14   Ht 176.5 cm (69.5\")   Wt 81.9 kg (180 lb 8 oz)   SpO2 97%   BMI 26.27 kg/m²       "

## 2020-09-11 VITALS
TEMPERATURE: 98 F | DIASTOLIC BLOOD PRESSURE: 56 MMHG | HEIGHT: 70 IN | BODY MASS INDEX: 25.84 KG/M2 | WEIGHT: 180.5 LBS | HEART RATE: 70 BPM | OXYGEN SATURATION: 93 % | RESPIRATION RATE: 16 BRPM | SYSTOLIC BLOOD PRESSURE: 96 MMHG

## 2020-09-11 PROCEDURE — 25010000003 CEFAZOLIN IN DEXTROSE 2-4 GM/100ML-% SOLUTION: Performed by: ORTHOPAEDIC SURGERY

## 2020-09-11 PROCEDURE — 97110 THERAPEUTIC EXERCISES: CPT

## 2020-09-11 PROCEDURE — 97535 SELF CARE MNGMENT TRAINING: CPT

## 2020-09-11 PROCEDURE — 97166 OT EVAL MOD COMPLEX 45 MIN: CPT

## 2020-09-11 PROCEDURE — 25010000002 HYDROMORPHONE PER 4 MG: Performed by: ORTHOPAEDIC SURGERY

## 2020-09-11 PROCEDURE — 25010000002 ONDANSETRON PER 1 MG: Performed by: ORTHOPAEDIC SURGERY

## 2020-09-11 PROCEDURE — 99024 POSTOP FOLLOW-UP VISIT: CPT | Performed by: NURSE PRACTITIONER

## 2020-09-11 RX ORDER — PSEUDOEPHEDRINE HCL 30 MG
100 TABLET ORAL 2 TIMES DAILY PRN
Start: 2020-09-11 | End: 2020-12-29

## 2020-09-11 RX ORDER — HYDROCODONE BITARTRATE AND ACETAMINOPHEN 7.5; 325 MG/1; MG/1
TABLET ORAL
Qty: 42 TABLET | Refills: 0 | Status: SHIPPED | OUTPATIENT
Start: 2020-09-11 | End: 2020-10-23

## 2020-09-11 RX ORDER — ONDANSETRON 4 MG/1
4 TABLET, FILM COATED ORAL EVERY 6 HOURS PRN
Qty: 12 TABLET | Refills: 0 | Status: SHIPPED | OUTPATIENT
Start: 2020-09-11 | End: 2020-12-29

## 2020-09-11 RX ADMIN — ONDANSETRON 4 MG: 2 INJECTION INTRAMUSCULAR; INTRAVENOUS at 05:07

## 2020-09-11 RX ADMIN — CEFAZOLIN SODIUM 2 G: 2 INJECTION, SOLUTION INTRAVENOUS at 03:37

## 2020-09-11 RX ADMIN — FINASTERIDE 5 MG: 5 TABLET, FILM COATED ORAL at 08:52

## 2020-09-11 RX ADMIN — HYDROCODONE BITARTRATE AND ACETAMINOPHEN 1 TABLET: 7.5; 325 TABLET ORAL at 12:59

## 2020-09-11 RX ADMIN — POLYETHYLENE GLYCOL 3350 17 G: 17 POWDER, FOR SOLUTION ORAL at 08:53

## 2020-09-11 RX ADMIN — CELECOXIB 200 MG: 200 CAPSULE ORAL at 08:52

## 2020-09-11 RX ADMIN — HYDROCODONE BITARTRATE AND ACETAMINOPHEN 2 TABLET: 7.5; 325 TABLET ORAL at 08:52

## 2020-09-11 RX ADMIN — HYDROCODONE BITARTRATE AND ACETAMINOPHEN 1 TABLET: 7.5; 325 TABLET ORAL at 01:59

## 2020-09-11 RX ADMIN — MUPIROCIN 1 APPLICATION: 20 OINTMENT TOPICAL at 08:53

## 2020-09-11 RX ADMIN — DILTIAZEM HYDROCHLORIDE 120 MG: 120 CAPSULE, COATED, EXTENDED RELEASE ORAL at 08:52

## 2020-09-11 RX ADMIN — HYDROMORPHONE HYDROCHLORIDE 0.5 MG: 1 INJECTION, SOLUTION INTRAMUSCULAR; INTRAVENOUS; SUBCUTANEOUS at 05:07

## 2020-09-11 RX ADMIN — SODIUM CHLORIDE, PRESERVATIVE FREE 3 ML: 5 INJECTION INTRAVENOUS at 08:53

## 2020-09-11 RX ADMIN — MELOXICAM 15 MG: 15 TABLET ORAL at 08:52

## 2020-09-11 NOTE — THERAPY EVALUATION
Acute Care - Occupational Therapy Initial Evaluation  [facility]     Patient Name: Josue Rothman  : 1935  MRN: [id number]  Today's Date: 2020             Admit Date: 9/10/2020       ICD-10-CM ICD-9-CM   1. 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 Shoulder arthritis M19.019 716.91     Patient Active Problem List   Diagnosis   • Bilateral shoulder pain   • Shoulder arthritis     Past Medical History:   Diagnosis Date   • Afib (CMS/[facility]) 2018   • Arthritis     OSTEO   • Elevated cholesterol    • Hyperlipidemia    • Left shoulder pain    • Limited range of motion (ROM) of shoulder    • Renal disorder      Past Surgical History:   Procedure Laterality Date   • BACK SURGERY  2009    RODS & SCREWS   • HERNIA REPAIR     • KIDNEY STONE SURGERY     • TOTAL SHOULDER ARTHROPLASTY W/ DISTAL CLAVICLE EXCISION Left 9/10/2020    Procedure: TOTAL SHOULDER REVERSE ARTHROPLASTY;  Surgeon: Jayden Holbrook MD;  Location: [facility] MAIN OR;  Service: Orthopedics;  Laterality: Left;          OT ASSESSMENT FLOWSHEET (last 12 hours)      Occupational Therapy Evaluation     Row Name 20 1343                   OT Evaluation Time/Intention    Subjective Information  no complaints  -RB        Document Type  evaluation  -RB        Mode of Treatment  individual therapy;occupational therapy  -RB        Patient Effort  good  -RB        Symptoms Noted During/After Treatment  none  -RB        Comment  Pt very pleasant and agreeable to OT.  -RB           General Information    Patient Profile Reviewed?  yes  -RB        Patient Observations  alert;cooperative;agree to therapy  -RB        Prior Level of Function  independent:;ADL's;transfer  -RB        Equipment Currently Used at Home  none  -RB        Existing Precautions/Restrictions  fall;shoulder  -RB        Barriers to Rehab  none identified  -RB           Relationship/Environment    Primary Source of Support/Comfort  significant  other;extended family;child(liam)  -RB        Lives With  alone  -RB        Primary Roles/Responsibilities  retired  -RB           Resource/Environmental Concerns    Current Living Arrangements  home/apartment/condo  -RB        Resource/Environmental Concerns  none  -RB           Home Main Entrance    Number of Stairs, Main Entrance  two  -RB           Cognitive Assessment/Intervention- PT/OT    Orientation Status (Cognition)  oriented x 3  -RB        Follows Commands (Cognition)  WNL  -RB        Safety Deficit (Cognitive)  safety precautions awareness;safety precautions follow-through/compliance  -RB        Cognitive Assessment/Intervention Comment  Cues for safety of LUE  -RB           Mobility Assessment/Treatment    Extremity Weight-bearing Status  left upper extremity  -RB        Left Upper Extremity (Weight-bearing Status)  non weight-bearing (NWB)  -RB           Bed Mobility Assessment/Treatment    Bed Mobility Assessment/Treatment  bed mobility (all) activities  -RB        [place] Level (Bed Mobility)  conditional independence  -RB        Bed Mobility, Safety Issues  decreased use of arms for pushing/pulling  -RB        Comment (Bed Mobility)  Cues for not placing any weight through arm when transferring OOB  -RB           Functional Mobility    Functional Mobility- Ind. Level  conditional independence  -RB        Functional Mobility-Maintain WBing  able to maintain weight bearing status  -RB           Transfer Assessment/Treatment    Transfer Assessment/Treatment  toilet transfer;sit-stand transfer;stand-sit transfer  -RB        Maintains Weight-bearing Status (Transfers)  able to maintain  -RB           Sit-Stand Transfer    Sit-Stand [place] (Transfers)  conditional independence  -RB           Stand-Sit Transfer    Stand-Sit [place] (Transfers)  conditional independence  -RB           Toilet Transfer    Type (Toilet Transfer)  sit-stand;stand-sit  -RB        [place] Level (Toilet  Transfer)  conditional independence  -RB           ADL Assessment/Intervention    BADL Assessment/Intervention  upper body dressing;lower body dressing;grooming;clothing fastener management;toileting  -RB           Upper Body Dressing Assessment/Training    Upper Body Dressing Prentiss Level  upper body dressing skills;conditional independence  -RB        Assistive Devices (Upper Body Dressing)  one hand technique  -RB        Upper Body Dressing Position  edge of bed sitting;supported sitting  -RB        Comment (Upper Body Dressing)  cues for one handed technique   -RB           Lower Body Dressing Assessment/Training    Lower Body Dressing Prentiss Level  lower body dressing skills;doff;don;pants/bottoms;shoes/slippers;socks;undergarment  -RB        Assistive Devices (Lower Body Dressing)  one hand technique  -RB        Lower Body Dressing Position  edge of bed sitting;unsupported standing  -RB        Comment (Lower Body Dressing)  Cues for one handed technique  -RB           Clothes Fastener Management    Prent[place] Level (Clothes Fastener Management)  clothes fastener management;buttons;conditional independence  -RB        Position (Clothes Fastener Management)  edge of bed sitting  -RB        Comment (Clothes Fastener Management)  one handed, increased time  -RB           Grooming Assessment/Training    Prentiss Level (Grooming)  grooming skills;conditional independence  -RB        Grooming Position  supported sitting  -RB           Toileting Assessment/Training    Prentiss Level (Toileting)  toileting skills;conditional independence  -RB        Assistive Devices (Toileting)  grab bar/safety frame  -RB        Toileting Position  supported sitting;unsupported standing  -RB           BADL Safety/Performance    Impairments, BADL Safety/Performance  pain;range of motion;strength  -RB        Skilled BADL Treatment/Intervention  adaptive equipment training;BADL process/adaptation  training;cognitive/safety deficit modifications;environmental modifications;energy conservation;kermit/one-hand technique  -RB        Progress in BADL Status  level of supervision required  -RB           General ROM    GENERAL ROM COMMENTS  RUE lacking ~40* shoulder flexion (baseline), L shoulder not tested, L elbow->digits WFL  -RB           MMT (Manual Muscle Testing)    General MMT Comments  RUE grossly 3+/5 generalized weakness from shoulder injury  -RB           Motor Assessment/Interventions    Additional Documentation  Therapeutic Exercise (Group);Therapeutic Exercise Interventions (Group)  -RB           Therapeutic Exercise    Upper Extremity Range of Motion (Therapeutic Exercise)  elbow flexion/extension, left;forearm supination/pronation, left;wrist flexion/extension, left shoulder pendulums  -RB        Hand (Therapeutic Exercise)  finger flexion/extension, left;thumb finger opposition, left;hand , left  -RB        Exercise Type (Therapeutic Exercise)  AROM (active range of motion)  -RB        Position (Therapeutic Exercise)  seated;standing  -RB        Sets/Reps (Therapeutic Exercise)  1 x 10  -RB        Expected Outcome (Therapeutic Exercise)  facilitate normal movement patterns;improve performance, BADLs;increase active range of motion  -RB        Comment (Therapeutic Exercise)  Time required for sling management, cues for technique of exercises, cues for safety when standing completing pendulums.  -RB           Sensory Assessment/Intervention    Sensory General Assessment  no sensation deficits identified  -RB           Positioning and Restraints    Pre-Treatment Position  in bed  -RB        Post Treatment Position  chair  -RB        In Chair  notified nsg;reclined;sitting;call light within reach;encouraged to call for assist;exit alarm on;LUE elevated  -RB           Pain Assessment    Additional Documentation  Pain Scale: Numbers Pre/Post-Treatment (Group)  -RB           Pain Scale: Numbers  Pre/Post-Treatment    Pain Scale: Numbers, Pretreatment  2/10  -RB        Pain Scale: Numbers, Post-Treatment  2/10  -RB        Pain Location - Side  Left  -RB        Pain Location - Orientation  incisional  -RB        Pain Location  shoulder  -RB        Pain Intervention(s)  Cold applied;Repositioned;Rest  -RB           Wound 09/10/20 1012 Left shoulder    Wound - Properties Group Date first assessed: 09/10/20  -MB Time first assessed: 1012  -MB Side: Left  -MB Location: shoulder  -MB       Coping    Observed Emotional State  accepting;calm;cooperative  -RB           Plan of Care Review    Plan of Care Reviewed With  patient  -RB        Progress  improving  -RB           Clinical Impression (OT)    Criteria for Skilled Therapeutic Interventions Met (OT Eval)  no;no significant expected improvement in functional status  -RB        Therapy Frequency (OT Eval)  evaluation only  -RB        Care Plan Review (OT)  patient/other agree to care plan  -RB        Anticipated Discharge Disposition (OT)  home with OP services  -RB           Vital Signs    O2 Delivery Pre Treatment  room air  -RB        O2 Delivery Intra Treatment  room air  -RB        O2 Delivery Post Treatment  room air  -RB           Planned OT Interventions    Planned Therapy Interventions (OT Eval)  BADL retraining;ROM/therapeutic exercise;transfer/mobility retraining;patient/caregiver education/training;occupation/activity based interventions  -RB           OT Goals    Bed Mobility Goal Selection (OT)  bed mobility, OT goal 1  -RB        Transfer Goal Selection (OT)  transfer, OT goal 1  -RB        Bathing Goal Selection (OT)  bathing, OT goal 1  -RB        Dressing Goal Selection (OT)  dressing, OT goal 1  -RB        Toileting Goal Selection (OT)  toileting, OT goal 1  -RB        ROM Goal Selection (OT)  ROM, OT goal 1  -RB        Additional Documentation  ROM Goal Selection (OT) (Row)  -RB           Bed Mobility Goal 1 (OT)    Activity/Assistive Device  (Bed Mobility Goal 1, OT)  bed mobility activities, all  -RB        [place] Level/Cues Needed (Bed Mobility Goal 1, OT)  conditional independence  -RB        Time Frame (Bed Mobility Goal 1, OT)  short term goal (STG);2 weeks  -RB        Progress/Outcomes (Bed Mobility Goal 1, OT)  goal met  -RB           Transfer Goal 1 (OT)    Activity/Assistive Device (Transfer Goal 1, OT)  transfers, all  -RB        [place] Level/Cues Needed (Transfer Goal 1, OT)  conditional independence  -RB        Time Frame (Transfer Goal 1, OT)  short term goal (STG);2 weeks  -RB        Progress/Outcome (Transfer Goal 1, OT)  goal met  -RB           Bathing Goal 1 (OT)    Activity/Assistive Device (Bathing Goal 1, OT)  bathing skills, all  -RB        [place] Level/Cues Needed (Bathing Goal 1, OT)  conditional independence  -RB        Time Frame (Bathing Goal 1, OT)  short term goal (STG);2 weeks  -RB        Progress/Outcomes (Bathing Goal 1, OT)  goal met  -RB           Dressing Goal 1 (OT)    Activity/Assistive Device (Dressing Goal 1, OT)  dressing skills, all  -RB        [place]/Cues Needed (Dressing Goal 1, OT)  conditional independence  -RB        Time Frame (Dressing Goal 1, OT)  short term goal (STG);2 weeks  -RB        Progress/Outcome (Dressing Goal 1, OT)  goal met  -RB           Toileting Goal 1 (OT)    Activity/Device (Toileting Goal 1, OT)  toileting skills, all  -RB        [place] Level/Cues Needed (Toileting Goal 1, OT)  conditional independence  -RB        Time Frame (Toileting Goal 1, OT)  short term goal (STG);2 weeks  -RB        Progress/Outcome (Toileting Goal 1, OT)  goal met  -RB           ROM Goal 1 (OT)    ROM Goal 1 (OT)  Pt to complete LUE shoulder HEP with Mod I.  -RB        Time Frame (ROM Goal 1, OT)  short term goal (STG);2 weeks  -RB        Progress/Outcome (ROM Goal 1, OT)  goal met  -RB           Patient Education Goal (OT)    Activity (Patient Education Goal, OT)  Pt to complete LUE  sling management with Mod I.  -RB        [place]/Cues/Accuracy (Memory Goal 2, OT)  demonstrates adequately  -RB        Time Frame (Patient Education Goal, OT)  short term goal (STG);2 weeks  -RB        Progress/Outcome (Patient Education Goal, OT)  goal met  -RB           Living Environment    Home Accessibility  stairs to enter home  -RB          User Key  (r) = Recorded By, (t) = Taken By, (c) = Cosigned By    Initials Name Effective Dates    Gail Rueda RN 06/16/16 -     RB Tiffanie Prakash OT 04/02/20 -          Occupational Therapy Education                 Title: PT OT SLP Therapies (Resolved)     Topic: Occupational Therapy (Resolved)     Point: ADL training (Resolved)     Description:   Instruct learner(s) on proper safety adaptation and remediation techniques during self care or transfers.   Instruct in proper use of assistive devices.              Learner Progress:   Not documented in this visit.          Point: Home exercise program (Resolved)     Description:   Instruct learner(s) on appropriate technique for monitoring, assisting and/or progressing therapeutic exercises/activities.              Learner Progress:   Not documented in this visit.          Point: Precautions (Resolved)     Description:   Instruct learner(s) on prescribed precautions during self-care and functional transfers.              Learner Progress:   Not documented in this visit.          Point: Body mechanics (Resolved)     Description:   Instruct learner(s) on proper positioning and spine alignment during self-care, functional mobility activities and/or exercises.              Learner Progress:   Not documented in this visit.                              OT Recommendation and Plan  Outcome Summary/Treatment Plan (OT)  Anticipated Discharge Disposition (OT): home with OP services  Planned Therapy Interventions (OT Eval): BADL retraining, ROM/therapeutic exercise, transfer/mobility retraining, patient/caregiver  education/training, occupation/activity based interventions  Therapy Frequency (OT Eval): evaluation only  Plan of Care Review  Plan of Care Reviewed With: patient  Plan of Care Reviewed With: patient    Outcome Measures     Row Name 09/11/20 1300             How much help from another is currently needed...    Putting on and taking off regular lower body clothing?  4  -RB      Bathing (including washing, rinsing, and drying)  3  -RB      Toileting (which includes using toilet bed pan or urinal)  4  -RB      Putting on and taking off regular upper body clothing  4  -RB      Taking care of personal grooming (such as brushing teeth)  4  -RB      Eating meals  4  -RB      AM-PAC 6 Clicks Score (OT)  23  -RB         Functional Assessment    Outcome Measure Options  AM-PAC 6 Clicks Daily Activity (OT)  -RB        User Key  (r) = Recorded By, (t) = Taken By, (c) = Cosigned By    Initials Name Provider Type    Tiffanie Song OT Occupational Therapist          Time Calculation:   Time Calculation- OT     Row Name 09/11/20 1342             Time Calculation- OT    OT Start Time  0958  -RB      OT Stop Time  1032  -RB      OT Time Calculation (min)  34 min  -RB      Total Timed Code Minutes- OT  23 minute(s)  -RB      OT Received On  09/11/20  -RB        User Key  (r) = Recorded By, (t) = Taken By, (c) = Cosigned By    Initials Name Provider Type    Tiffanie Song OT Occupational Therapist        Therapy Charges for Today     Code Description Service Date Service Provider Modifiers Qty    73869310178 HC OT EVAL MOD COMPLEXITY 2 9/11/2020 Tiffanie Prakash OT GO 1    [id number]  OT SELF CARE/MGMT/TRAIN EA 15 MIN 9/11/2020 Tiffanie Prakash OT GO 1    [id number]  OT THER PROC EA 15 MIN 9/11/2020 Tiffanie Prakash OT GO 1               Tiffanie Prakash OT  9/11/2020

## 2020-09-11 NOTE — PLAN OF CARE
Problem: Patient Care Overview  Goal: Plan of Care Review  Outcome: Ongoing (interventions implemented as appropriate)  Flowsheets (Taken 9/11/2020 0227)  Progress: improving  Plan of Care Reviewed With: patient  Outcome Summary: Pt remains stable. Effects of nerve block beginning to wear off, PO pain meds given with relief. Pt is voiding adequately per BRP. Ambulates with stand by assist. Sling in place, dressing is CDI with hv as well. Plans to d/c home today. Will continue to monitor.

## 2020-09-11 NOTE — PROGRESS NOTES
Continued Stay Note  [facility]     Patient Name: Josue Rothman  MRN: [id number]  Today's Date: 9/11/2020    Admit Date: 9/10/2020    Discharge Plan     Row Name 09/11/20 1111       Plan    Final Discharge Disposition Code  01 - home or self-care    Final Note  Pt to d/c home s/p TSA.        Discharge Codes    No documentation.       Expected Discharge Date and Time     Expected Discharge Date Expected Discharge Time    Sep 11, 2020             Marlys Robles RN

## 2020-09-11 NOTE — DISCHARGE SUMMARY
Date of Admission:  9/10/2020    Date of Discharge:  9/11/2020    Discharge Diagnosis: s/p Left reverse total shoulder arthroplasty    Admitting Physician: Jayden Holbrook    Consults: none    DETAILS OF HOSPITAL STAY:  Patient is a 85 y.o. male was admitted to the floor following a reverse total shoulder arthroplasty.  Post-operatively the patient was transferred to the post-operative floor where the patient underwent physical therapy including both active and passive ROM exercises. Opioids were titrated to achieve appropriate pain management to allow for participation in mobilization exercises. Vital signs are now stable. The incision is benign without signs or symptoms of infection. Operative extremity neurovascular status remains intact. Appropriate education re: incision care, activity levels, medications, and follow up visits was completed and all questions were answered. The patient is now deemed stable for discharge to home.    Condition on Discharge:  Stable    Discharge Medications     Discharge Medications      New Medications      Instructions Start Date   docusate sodium 100 MG capsule  Notes to patient:  Next dose available: tonight   100 mg, Oral, 2 Times Daily PRN      HYDROcodone-acetaminophen 7.5-325 MG per tablet  Commonly known as:  NORCO  Notes to patient:  Next dose available: 5PM   Take 1-2 tablets by mouth every 4 to 6 hours as needed for pain.  Not to exceed 6 tablets per day.      ondansetron 4 MG tablet  Commonly known as:  ZOFRAN  Notes to patient:  Next dose available at any time   4 mg, Oral, Every 6 Hours PRN         Continue These Medications      Instructions Start Date   aspirin 81 MG EC tablet  Notes to patient:  Next dose due: this evening   81 mg, Oral, Every 3 Days      atorvastatin 40 MG tablet  Commonly known as:  LIPITOR  Notes to patient:  Next dose due: tonight   20 mg, Oral, Nightly      B-6 PO  Notes to patient:  Next dose due: tomorrow   1 tablet, Oral, Daily       celecoxib 200 MG capsule  Commonly known as:  CeleBREX  Notes to patient:  Next dose due: tomorrow   200 mg, Oral, Daily      co-enzyme Q-10 30 MG capsule  Notes to patient:  Next dose due: tomorrow   30 mg, Oral, Daily, HOLD PRIOR TO SURGERY      Cod Liver Oil capsule  Notes to patient:  Next dose due: tomorrow   1 capsule, Oral, Daily, HOLD PRIOR TO SURGERY      Dilt- MG 24 hr capsule  Generic drug:  dilTIAZem XR  Notes to patient:  Next dose due: tonight   120 mg, Oral, 2 times daily      Eliquis 5 MG tablet tablet  Generic drug:  apixaban  Notes to patient:  Next dose due: tonight   5 mg, Oral, Every Evening, HOLD PRIOR TO SURGERY 09-      finasteride 5 MG tablet  Commonly known as:  PROSCAR  Notes to patient:  Next dose due: tomorrow   5 mg, Oral, Daily      gabapentin 300 MG capsule  Commonly known as:  NEURONTIN  Notes to patient:  Next dose due: tonight   300 mg, Oral, Every Evening      GENEVA ROOT PO  Notes to patient:  Next dose due: tomorrow   1 tablet, Oral, Daily      GLUCOSAMINE 1500 COMPLEX PO  Notes to patient:  Next dose due: tomorrow   1 tablet, Oral, Daily      tamsulosin 0.4 MG capsule 24 hr capsule  Commonly known as:  FLOMAX  Notes to patient:  Next dose due: tomorrow   1 capsule, Oral, Every Other Day         Stop These Medications    CHLORHEXIDINE GLUCONATE CLOTH EX     mupirocin 2 % nasal ointment  Commonly known as:  BACTROBAN     naproxen sodium 220 MG tablet  Commonly known as:  ALEVE            Discharge Diet: regular    Activity at Discharge: as tolerated    Discharge Instructions:   1)  Patient is to continue with physical therapy exercises daily and continue working with the physical therapist as ordered.  2)  Continue to follow precautions as instructed.   3)  Patient is instructed to continue use of the sling except when performing their physical therapy exercising and while dressing or showering.  4)  Continue to ice regularly. Patient also instructed on incentive  spirometer during hospitalization and encouraged to continue to use at home regularly.   5)  The dressing should be left in place. If waterproof dressing is intact the patient may shower immediately following discharge. If the dressing becomes disloged or saturated it should be changed and patient must wait until POD #5 to shower. If dressing is changed, apply dry sterile dressing after showering.  6)  Follow up appointment in 2 weeks - patient to call the office at [phone] to schedule.     Follow-up Appointments  2 weeks with Dr Yusef Perez, NICOLE  09/11/20  17:21

## 2020-09-11 NOTE — PLAN OF CARE
Pt presents to [facility] 2/2 to a planned left reverse total shoulder arthroplasty. Pt is a very pleasant gentleman who lives alone and independent with ADL's and transfers. Today, pt demonstrated LUE HEP shoulder exercise program with Mod I. Pt able to manage his sling with no hands on assist as well as demonstrate total body dressing with Mod I utilizing the one hand technique with increased time. Pt with plans to d/c home today with his s/o assist and OP therapy.    Patient was wearing a face mask during this therapy encounter. Therapist used appropriate personal protective equipment including mask, goggles and gloves. Mask used was standard procedure mask. Appropriate PPE was worn during the entire therapy session. Hand hygiene was completed before and after therapy session. Patient is not in enhanced droplet precautions.

## 2020-09-21 ENCOUNTER — OFFICE VISIT (OUTPATIENT)
Dept: ORTHOPEDIC SURGERY | Facility: CLINIC | Age: 85
End: 2020-09-21

## 2020-09-21 VITALS — WEIGHT: 180 LBS | TEMPERATURE: 97.6 F | HEIGHT: 69 IN | BODY MASS INDEX: 26.66 KG/M2

## 2020-09-21 DIAGNOSIS — M11.262 CHONDROCALCINOSIS OF LEFT KNEE: ICD-10-CM

## 2020-09-21 DIAGNOSIS — M17.12 PRIMARY OSTEOARTHRITIS OF LEFT KNEE: Primary | ICD-10-CM

## 2020-09-21 PROCEDURE — 73562 X-RAY EXAM OF KNEE 3: CPT | Performed by: NURSE PRACTITIONER

## 2020-09-21 PROCEDURE — 99213 OFFICE O/P EST LOW 20 MIN: CPT | Performed by: NURSE PRACTITIONER

## 2020-09-21 PROCEDURE — 20610 DRAIN/INJ JOINT/BURSA W/O US: CPT | Performed by: NURSE PRACTITIONER

## 2020-09-21 RX ORDER — METHYLPREDNISOLONE ACETATE 80 MG/ML
80 INJECTION, SUSPENSION INTRA-ARTICULAR; INTRALESIONAL; INTRAMUSCULAR; SOFT TISSUE
Status: COMPLETED | OUTPATIENT
Start: 2020-09-21 | End: 2020-09-21

## 2020-09-21 RX ADMIN — METHYLPREDNISOLONE ACETATE 80 MG: 80 INJECTION, SUSPENSION INTRA-ARTICULAR; INTRALESIONAL; INTRAMUSCULAR; SOFT TISSUE at 09:43

## 2020-09-25 ENCOUNTER — OFFICE VISIT (OUTPATIENT)
Dept: ORTHOPEDIC SURGERY | Facility: CLINIC | Age: 85
End: 2020-09-25

## 2020-09-25 VITALS — HEIGHT: 69 IN | TEMPERATURE: 99 F | BODY MASS INDEX: 26.66 KG/M2 | WEIGHT: 180 LBS

## 2020-09-25 DIAGNOSIS — Z96.612 STATUS POST REVERSE TOTAL REPLACEMENT OF LEFT SHOULDER: ICD-10-CM

## 2020-09-25 DIAGNOSIS — Z09 SURGERY FOLLOW-UP: Primary | ICD-10-CM

## 2020-09-25 PROCEDURE — 73030 X-RAY EXAM OF SHOULDER: CPT | Performed by: NURSE PRACTITIONER

## 2020-09-25 PROCEDURE — 99024 POSTOP FOLLOW-UP VISIT: CPT | Performed by: NURSE PRACTITIONER

## 2020-09-25 NOTE — PROGRESS NOTES
"Josue Rothman : 1935 MRN: [id number] DATE: 2020    DIAGNOSIS:  2 week follow up left shoulder arthroplasty      SUBJECTIVE:  Patient returns today for 2 week follow up of left shoulder replacement. Patient reports doing well with no unusual complaints.      OBJECTIVE:    Temp 99 °F (37.2 °C) (Temporal)   Ht 175.3 cm (69\")   Wt 81.6 kg (180 lb)   BMI 26.58 kg/m²     Exam:  The incision is well approximated.  No erythema or drainage. Shoulder moves fluidly with pendulums.  The arm is soft and nontender.  Intact motor and sensory function in the lower arm and hand.  Palpable radial pulse.    DIAGNOSTIC STUDIES    Xrays: AP and scapular Y views of the left shoulder are ordered and reviewed for evaluation of the recent shoulder replacement.  The x-rays demonstrate a well positioned, well aligned replacement without complicating factors noted.  In comparison with previous films, there has been no change.    ASSESSMENT: 2 week follow up left shoulder replacement.    PLAN:   1.  Begin PT per protocol--prescription given along with 2 copies of my protocol.  2.  Continue sling until next visit.  3.  Counseled patient about appropriate activity modifications and restrictions, including no driving at this point.    Nohemy Perez, NICOLE     2020      "

## 2020-09-28 ENCOUNTER — HOSPITAL ENCOUNTER (OUTPATIENT)
Dept: PHYSICAL THERAPY | Facility: HOSPITAL | Age: 85
Setting detail: THERAPIES SERIES
Discharge: HOME | End: 2020-09-28

## 2020-09-28 DIAGNOSIS — M25.512 ACUTE PAIN OF LEFT SHOULDER: Primary | ICD-10-CM

## 2020-09-28 DIAGNOSIS — M25.611 DECREASED RIGHT SHOULDER RANGE OF MOTION: ICD-10-CM

## 2020-09-28 DIAGNOSIS — Z47.89 ORTHOPEDIC AFTERCARE: ICD-10-CM

## 2020-09-28 PROCEDURE — 97161 PT EVAL LOW COMPLEX 20 MIN: CPT

## 2020-09-28 PROCEDURE — 97110 THERAPEUTIC EXERCISES: CPT

## 2020-09-28 NOTE — THERAPY EVALUATION
"    Outpatient Physical Therapy Ortho Initial Evaluation  [facility]     Patient Name: Josue Rothman  : 1935  MRN: [id number]  Today's Date: 2020      Visit Date: 2020    Patient Active Problem List   Diagnosis   • Bilateral shoulder pain   • Shoulder arthritis        Past Medical History:   Diagnosis Date   • Afib (CMS/[facility]) 2018   • Arthritis     OSTEO   • Elevated cholesterol    • Hyperlipidemia    • Left shoulder pain    • Limited range of motion (ROM) of shoulder    • Renal disorder         Past Surgical History:   Procedure Laterality Date   • BACK SURGERY  2009    RODS & SCREWS   • HERNIA REPAIR     • KIDNEY STONE SURGERY     • TOTAL SHOULDER ARTHROPLASTY W/ DISTAL CLAVICLE EXCISION Left 9/10/2020    Procedure: TOTAL SHOULDER REVERSE ARTHROPLASTY;  Surgeon: Jayden Holbrook MD;  Location: [facility];  Service: Orthopedics;  Laterality: Left;       Visit Dx:     ICD-10-CM ICD-9-CM   1. Acute pain of left shoulder  M25.512 719.41   2. Decreased right shoulder range of motion  M25.611 719.51   3. Orthopedic aftercare  Z47.89 V54.9         Patient History     Row Name 20 0900             History    Brief Description of Current Complaint  Pt s/p L shoulder reverse TSA on 9/10 with Dr. Holbrook. Pt reports good tolerance to surgery and low pain levels overall. Pt not wearing sling in clinic today and reports he has been doing \"exercises\" on his own at home. Prior to surgery, pt going to gym 3x/week consisting of cardio, machines and lifting. Pt highly motivated to return to activity, however also with history of RTC tear and poor mobility in the R shoulder. Pt planning to undergo R reverse TSA in 2021. Pt reports he has been sleeping on L shoulder without difficulty.   -CN         Pain     Pain Location  Shoulder  -CN      Pain at Present  1  -CN      Pain at Best  1  -CN      Pain at Worst  2  -CN      Pain Frequency  Constant/continuous  -CN      Pain Description  " Sore  -CN      What Performance Factors Make the Current Problem(s) BETTER?  Haven't been using ice and off pain meds since 4 days after surgery; taking Tylenol occasionally  -CN      Is your sleep disturbed?  No  -CN      Difficulties at work?  Retired  -CN      Difficulties with ADL's?  Yes, reaching [place]  -CN      Difficulties with recreational activities?  Yes, go to gym 3x/week (some cardio and lifting), playing tennis  -CN         Fall Risk Assessment    Any falls in the past year:  No  -CN         Services    Are you currently receiving Home Health services  No  -CN         Daily Activities    Primary Language  English  -CN      How does patient learn best?  Listening;Demonstration  -CN      Barriers to learning  None  -CN      Pt Participated in POC and Goals  Yes  -CN         Safety    Are you being hurt, hit, or frightened by anyone at home or in your life?  No  -CN      Are you being neglected by a caregiver  No  -CN        User Key  (r) = Recorded By, (t) = Taken By, (c) = Cosigned By    Initials Name Provider Type    CN Sondra Banks, PT Physical Therapist          PT Ortho     Row Name 09/28/20 0900       Posture/Observations    Alignment Options  Forward head;Rounded shoulders  -CN    Forward Head  Mild  -CN    Rounded Shoulders  Moderate  -CN    Observations  Incision healing  -CN       Shoulder Girdle Palpation    Shoulder Girdle Palpation?  -- No tenderness reported  -CN       General ROM    RT Upper Ext  Rt Shoulder Flexion;Rt Shoulder External Rotation;Rt Shoulder Internal Rotation;Rt Shoulder ABduction  -CN    LT Upper Ext  Lt Shoulder Flexion;Lt Shoulder External Rotation;Lt Shoulder Internal Rotation;Lt Shoulder ABduction  -CN       Right Upper Ext    Rt Shoulder Abduction AROM  65 seated  -CN    Rt Shoulder Flexion AROM  104 seated  -CN    Rt Shoulder External Rotation AROM  LUCIAN=T1  -CN    Rt Shoulder Internal Rotation AROM  FIR=L2  -CN       Left Upper Ext    Lt Shoulder Abduction  PROM  73  -CN    Lt Shoulder Flexion PROM  75  -CN    Lt Shoulder External Rotation PROM  12  -CN    Lt Shoulder Internal Rotation PROM  45  -CN       MMT (Manual Muscle Testing)    Rt Upper Ext  Rt Shoulder Flexion;Rt Shoulder ABduction;Rt Shoulder Internal Rotation;Rt Shoulder External Rotation  -CN    Lt Upper Ext  Lt Shoulder Flexion;Lt Shoulder ABduction;Lt Shoulder Internal Rotation;Lt Shoulder External Rotation  -CN       MMT Right Upper Ext    Rt Shoulder Flexion MMT, Gross Movement  (4/5) good  -CN    Rt Shoulder ABduction MMT, Gross Movement  (4-/5) good minus with pain  -CN    Rt Shoulder Internal Rotation MMT, Gross Movement  (4-/5) good minus  -CN    Rt Shoulder External Rotation MMT, Gross Movement  (4-/5) good minus  -CN       MMT Left Upper Ext    Lt Upper Extremity Comments   Did not test  -CN       Upper Extremity Flexibility    Upper Trapezius  Bilateral:;Mildly limited  -CN    Levator Scapula  Bilateral:;Mildly limited  -CN    Pect Minor  Bilateral:;Mildly limited;Moderately limited  -CN    Pect Major  Bilateral:;Mildly limited;Moderately limited  -CN      User Key  (r) = Recorded By, (t) = Taken By, (c) = Cosigned By    Initials Name Provider Type    CN Sondra Banks, PT Physical Therapist                      Therapy Education  Education Details: Anatomy, goals of PT, eval findings, prognosis, importance of compliance with restrictions, no gym activity at this time  Given: HEP, Symptoms/condition management, Pain management, Posture/body mechanics  Program: New  How Provided: Verbal, Demonstration, Written  Provided to: Patient  Level of Understanding: Teach back education performed, Verbalized, Demonstrated     PT OP Goals     Row Name 09/28/20 1500          PT Short Term Goals    STG Date to Achieve  10/28/20  -CN     STG 1  Pt will be independent with initial HEP for symptom management.  -CN     STG 1 Progress  New  -CN     STG 2  Pt will be compliant with precautions including  sling usage and ROM restrictions in order to reduce potential of dislocation.   -CN     STG 2 Progress  New  -CN        Long Term Goals    LTG Date to Achieve  11/28/20  -CN     LTG 1  Pt will be independent and compliant with advanced HEP for long term management of symptoms and prevention of future occurrence.   -CN     LTG 1 Progress  New  -CN     LTG 2  Pt will reduce level of perceived disability as measured by the quick DASH  to 15% in order to improve QOL.  -CN     LTG 2 Progress  New  -CN     LTG 3  Pt will demonstrate L shoulder ROM to flex=140, abd=135 and LUCIAN=T1 in order to improve ability to perform ADLs.   -CN     LTG 3 Progress  New  -CN     LTG 4  Pt will demonstrate L shoulder strength to 4/5 all planes in order to return to normalized gym activities.   -CN     LTG 4 Progress  New  -CN        Time Calculation    PT Goal Re-Cert Due Date  10/28/20  -CN       User Key  (r) = Recorded By, (t) = Taken By, (c) = Cosigned By    Initials Name Provider Type    Sondra Joe, PT Physical Therapist          PT Assessment/Plan     Row Name 09/28/20 1531          PT Assessment    Functional Limitations  Limitation in home management;Limitations in functional capacity and performance;Performance in leisure activities;Performance in self-care ADL  -CN     Impairments  Impaired flexibility;Muscle strength;Pain;Posture;Range of motion  -CN     Assessment Comments  85 y.o. male referred to outpatient physical therapy for evaluation and treatment of left shoulder pain following reverse TSA by Dr. Holbrook on 9/10.  Patient presents without sling, dec and painful PROM, poor posture and limited flexibility. Pt's signs and symptoms are consistent with referring diagnosis.  Pertinent comorbidities and personal factors that may affect progress include, but are not limited to, chronicity of problem, active lifestyle and performing many AROM tasks at home, decreased knowledge/compliance with healing timeline, R  shoulder pain (will likely undergo reverse TSA on the R Jan 2021). Pt with sling noncompliance at home and sleeping on L shoulder, and much time spent educating on precautions after surgery to protect replacement including use of sling and no AROM. Pt also planning to return to gym next week and advised against this as he is 2 weeks post op. Pt verbalizes understanding of precautions and will implement restrictions upon returning home. Pt scored 30% on the quick DASH where 0% is no disability and is in stable clinical condition. Pt would benefit from skilled PT to address functional deficits and return to PLOF.  -CN     Please refer to paper survey for additional self-reported information  Yes  -CN     Rehab Potential  Good  -CN     Patient/caregiver participated in establishment of treatment plan and goals  Yes  -CN     Patient would benefit from skilled therapy intervention  Yes  -CN        PT Plan    PT Frequency  2x/week  -CN     Predicted Duration of Therapy Intervention (OT)  12-14 visits  -CN     Planned CPT's?  PT EVAL LOW COMPLEXITY: 92660;PT RE-EVAL: 05390;PT THER PROC EA 15 MIN: 23579;PT THER ACT EA 15 MIN: 26639;PT MANUAL THERAPY EA 15 MIN: 19586;PT NEUROMUSC RE-EDUCATION EA 15 MIN: 24941;PT HOT OR COLD PACK TREAT MCARE  -CN     PT Plan Comments  Assess response to evaluation and continue with reverse TSA protocol (in  tab). Assess compliance with sling usage and activity precautions.  -CN       User Key  (r) = Recorded By, (t) = Taken By, (c) = Cosigned By    Initials Name Provider Type    Sondra Joe, PT Physical Therapist            OP Exercises     Row Name 09/28/20 1500             Total Minutes    73817 - PT Therapeutic Exercise Minutes  10  -CN         Exercise 1    Exercise Name 1  Supine PROM flex, self assist  -CN      Cueing 1  Verbal;Demo  -CN      Reps 1  5  -CN      Time 1  5 sec  -CN      Additional Comments  cues for PROM  -CN         Exercise 2    Exercise  Name 2  Supine ER with dowel  -CN      Cueing 2  Verbal;Demo  -CN      Reps 2  5  -CN      Time 2  5 sec  -CN      Additional Comments  small, painfree range  -CN         Exercise 3    Exercise Name 3  Scap squeeze  -CN      Cueing 3  Verbal;Demo  -CN      Reps 3  10  -CN      Time 3  5 sec  -CN         Exercise 4    Exercise Name 4  Post shoulder rolls  -CN      Cueing 4  Verbal;Demo  -CN      Reps 4  10  -CN      Time 4  5 sec  -CN         Exercise 5    Exercise Name 5  Pendulums  -CN      Cueing 5  Verbal  -CN      Time 5  2 min  -CN      Additional Comments  many cues for PROM vs AROM  -CN        User Key  (r) = Recorded By, (t) = Taken By, (c) = Cosigned By    Initials Name Provider Type    CN Sondra Banks, PT Physical Therapist                        Outcome Measure Options: Quick DASH(30% where 0% is no disability)         Time Calculation:     Start Time: 0915  Stop Time: 1000  Time Calculation (min): 45 min     Therapy Charges for Today     Code Description Service Date Service Provider Modifiers Qty    96244007577  PT THER PROC EA 15 MIN 9/28/2020 Sondra Banks, PT GP 1    62804597142  PT EVAL LOW COMPLEXITY 2 9/28/2020 Sondra Banks, PT GP 1          PT G-Codes  Outcome Measure Options: Quick DASH(30% where 0% is no disability)         Sondra Banks, ALLYN  9/28/2020

## 2020-10-01 ENCOUNTER — HOSPITAL ENCOUNTER (OUTPATIENT)
Dept: PHYSICAL THERAPY | Facility: HOSPITAL | Age: 85
Setting detail: THERAPIES SERIES
Discharge: HOME | End: 2020-10-01

## 2020-10-01 DIAGNOSIS — M25.611 DECREASED RIGHT SHOULDER RANGE OF MOTION: ICD-10-CM

## 2020-10-01 DIAGNOSIS — M25.512 ACUTE PAIN OF LEFT SHOULDER: Primary | ICD-10-CM

## 2020-10-01 DIAGNOSIS — Z47.89 ORTHOPEDIC AFTERCARE: ICD-10-CM

## 2020-10-01 PROCEDURE — 97140 MANUAL THERAPY 1/> REGIONS: CPT

## 2020-10-01 PROCEDURE — 97110 THERAPEUTIC EXERCISES: CPT

## 2020-10-01 NOTE — THERAPY TREATMENT NOTE
Outpatient Physical Therapy Ortho Treatment Note  [facility]     Patient Name: Josue Rothman  : 1935  MRN: [id number]  Today's Date: 10/1/2020      Visit Date: 10/01/2020    Visit Dx:    ICD-10-CM ICD-9-CM   1. Acute pain of left shoulder  M25.512 719.41   2. Decreased right shoulder range of motion  M25.611 719.51   3. Orthopedic aftercare  Z47.89 V54.9       Patient Active Problem List   Diagnosis   • Bilateral shoulder pain   • Shoulder arthritis        Past Medical History:   Diagnosis Date   • Afib (CMS/[facility]) 2018   • Arthritis     OSTEO   • Elevated cholesterol    • Hyperlipidemia    • Left shoulder pain    • Limited range of motion (ROM) of shoulder    • Renal disorder         Past Surgical History:   Procedure Laterality Date   • BACK SURGERY  2009    RODS & SCREWS   • HERNIA REPAIR     • KIDNEY STONE SURGERY     • TOTAL SHOULDER ARTHROPLASTY W/ DISTAL CLAVICLE EXCISION Left 9/10/2020    Procedure: TOTAL SHOULDER REVERSE ARTHROPLASTY;  Surgeon: Jayden Holbrook MD;  Location: [facility];  Service: Orthopedics;  Laterality: Left;                       PT Assessment/Plan     Row Name 10/01/20 0909          PT Assessment    Assessment Comments  Mr. Schulz returns for first follow up since eval.  No new complaints, pain 1.5/10.  Requires cuing for most exercises, to slow down and to limit range at this point.  No m. guarding noted.  -LP     Please refer to paper survey for additional self-reported information  Yes  -LP     Rehab Potential  Excellent  -LP     Patient/caregiver participated in establishment of treatment plan and goals  Yes  -LP     Patient would benefit from skilled therapy intervention  Yes  -LP        PT Plan    PT Frequency  2x/week  -LP     Predicted Duration of Therapy Intervention (OT)  4 weeks  -LP     PT Plan Comments  Continue to progress with ROM.  -LP       User Key  (r) = Recorded By, (t) = Taken By, (c) = Cosigned By    Initials Name Provider Type  "   LP Kenia Buchanan PT Physical Therapist          Modalities     Row Name 10/01/20 0800             Ice    Ice Applied  Yes  -LP      Location  L shoulder  -LP      Rx Minutes  10 mins  -LP      Ice S/P Rx  Yes  -LP        User Key  (r) = Recorded By, (t) = Taken By, (c) = Cosigned By    Initials Name Provider Type    Kenia Hernández PT Physical Therapist        OP Exercises     Row Name 10/01/20 0900 10/01/20 0800          Subjective Comments    Subjective Comments  --  \"feeling good\"  -LP        Subjective Pain    Able to rate subjective pain?  --  yes  -LP     Pre-Treatment Pain Level  --  2  -LP        Total Minutes    14600 - PT Therapeutic Exercise Minutes  --  20  -LP     12319 - OT Manual Therapy Minutes  12  -LP  --        Exercise 1    Exercise Name 1  --  Supine PROM flex, self assist  -LP     Cueing 1  --  Verbal;Demo  -LP     Reps 1  --  7  -LP     Time 1  --  5  -LP     Additional Comments  --  cuing to use more of R to control  -LP        Exercise 2    Exercise Name 2  --  Supine ER with dowel  -LP     Cueing 2  --  Verbal;Tactile;Demo  -LP     Reps 2  --  7  -LP     Time 2  --  5s  -LP     Additional Comments  --  limiting motion cuing  -LP        Exercise 3    Exercise Name 3  --  Scap squeeze  -LP     Cueing 3  --  Verbal;Demo  -LP     Reps 3  --  10  -LP     Time 3  --  5  -LP     Additional Comments  --  cuing to hold squeeze  -LP        Exercise 4    Exercise Name 4  --  Post shoulder rolls  -LP     Cueing 4  --  Verbal;Demo  -LP     Reps 4  --  10  -LP     Time 4  --  5  -LP        Exercise 5    Exercise Name 5  --  Pendulums  -LP     Cueing 5  --  Verbal  -LP     Time 5  --  2 min  -LP     Additional Comments  --  improved  -LP       User Key  (r) = Recorded By, (t) = Taken By, (c) = Cosigned By    Initials Name Provider Type    Kenia Hernández PT Physical Therapist                      Manual Rx (last 36 hours)      Manual Treatments     Row Name 10/01/20 0900             " Total Minutes    87709 - OT Manual Therapy Minutes  12  -LP         Manual Rx 1    Manual Rx 1 Location  L shoulder  -LP      Manual Rx 1 Type  PROM, oscillations to G-H jt  -LP      Manual Rx 1 Grade  gentle  -LP      Manual Rx 1 Duration  10  -LP         Manual Rx 2    Manual Rx 2 Location  STM to incision  -LP      Manual Rx 2 Grade  gentle  -LP      Manual Rx 2 Duration  2 min  -LP        User Key  (r) = Recorded By, (t) = Taken By, (c) = Cosigned By    Initials Name Provider Type    LP Kenia Buchanan PT Physical Therapist              Therapy Education  Education Details: following protocol for activity at home  Given: HEP, Symptoms/condition management  Program: Reinforced  How Provided: Verbal, Demonstration  Provided to: Patient  Level of Understanding: Teach back education performed              Time Calculation:   Start Time: 0830  Stop Time: 0915  Time Calculation (min): 45 min  Therapy Charges for Today     Code Description Service Date Service Provider Modifiers Qty    32096700216 HC PT MANUAL THERAPY EA 15 MIN 10/1/2020 Kenia Buchanan, PT GP 1    70283018489 HC PT HOT OR COLD PACK TREAT MCARE 10/1/2020 Kenia Buchanan, PT GP 1    02571797248 HC PT THER PROC EA 15 MIN 10/1/2020 Kenia Buchanan, PT GP 1                    Kenia Buchanan PT  10/1/2020

## 2020-10-07 ENCOUNTER — HOSPITAL ENCOUNTER (OUTPATIENT)
Dept: PHYSICAL THERAPY | Facility: HOSPITAL | Age: 85
Setting detail: THERAPIES SERIES
Discharge: HOME | End: 2020-10-07

## 2020-10-07 DIAGNOSIS — M25.611 DECREASED RIGHT SHOULDER RANGE OF MOTION: ICD-10-CM

## 2020-10-07 DIAGNOSIS — Z47.89 ORTHOPEDIC AFTERCARE: ICD-10-CM

## 2020-10-07 DIAGNOSIS — M25.512 ACUTE PAIN OF LEFT SHOULDER: Primary | ICD-10-CM

## 2020-10-07 PROCEDURE — 97110 THERAPEUTIC EXERCISES: CPT

## 2020-10-07 PROCEDURE — 97140 MANUAL THERAPY 1/> REGIONS: CPT

## 2020-10-07 NOTE — THERAPY TREATMENT NOTE
Outpatient Physical Therapy Ortho Treatment Note  [facility]     Patient Name: Josue Rothman  : 1935  MRN: [id number]  Today's Date: 10/7/2020      Visit Date: 10/07/2020    Visit Dx:    ICD-10-CM ICD-9-CM   1. Acute pain of left shoulder  M25.512 719.41   2. Decreased right shoulder range of motion  M25.611 719.51   3. Orthopedic aftercare  Z47.89 V54.9       Patient Active Problem List   Diagnosis   • Bilateral shoulder pain   • Shoulder arthritis        Past Medical History:   Diagnosis Date   • Afib (CMS/[facility]) 2018   • Arthritis     OSTEO   • Elevated cholesterol    • Hyperlipidemia    • Left shoulder pain    • Limited range of motion (ROM) of shoulder    • Renal disorder         Past Surgical History:   Procedure Laterality Date   • BACK SURGERY  2009    RODS & SCREWS   • HERNIA REPAIR     • KIDNEY STONE SURGERY     • TOTAL SHOULDER ARTHROPLASTY W/ DISTAL CLAVICLE EXCISION Left 9/10/2020    Procedure: TOTAL SHOULDER REVERSE ARTHROPLASTY;  Surgeon: Jayden Holbrook MD;  Location: [facility];  Service: Orthopedics;  Laterality: Left;       PT Ortho     Row Name 10/07/20 0800       Subjective Comments    Subjective Comments  Min c/o left shoulder pain.  No longer taking meds for pain.  Sleeping good  -SI       Subjective Pain    Able to rate subjective pain?  yes  -SI       Posture/Observations    Observations  Incision healing  -SI       Left Upper Ext    Lt Shoulder Abduction PROM  90  -SI    Lt Shoulder Flexion PROM  128  -SI    Lt Shoulder External Rotation PROM  45  -SI    Lt Shoulder Internal Rotation PROM  50 or sling position  -SI    Lt Upper Extremity Comments   left elbow extension lacks 15 degrees   -SI      User Key  (r) = Recorded By, (t) = Taken By, (c) = Cosigned By    Initials Name Provider Type    SI Effie Razo PTA Physical Therapy Assistant                      PT Assessment/Plan     Row Name 10/07/20 0834          PT Assessment    Assessment Comments   Discussed protocol and use of sling.  AAROM ex going well.  PROM good at 4 weeks  -SI       User Key  (r) = Recorded By, (t) = Taken By, (c) = Cosigned By    Initials Name Provider Type    Effie Jalloh PTA Physical Therapy Assistant            OP Exercises     Row Name 10/07/20 0800 10/07/20 0700          Subjective Comments    Subjective Comments  Min c/o left shoulder pain.  No longer taking meds for pain.  Sleeping good  -SI  --        Subjective Pain    Able to rate subjective pain?  yes  -SI  --        Total Minutes    81808 - PT Therapeutic Exercise Minutes  20  -SI  --     82358 - PT Manual Therapy Minutes  --  25  -SI        Exercise 1    Exercise Name 1  Supine AAROM flex  with cane  -SI  --     Cueing 1  Verbal;Demo  -SI  --     Reps 1  7  -SI  --     Time 1  5  -SI  --     Additional Comments  HEP  -SI  --        Exercise 2    Exercise Name 2  Supine ER with dowel  -SI  --     Cueing 2  Verbal;Tactile;Demo  -SI  --     Reps 2  7  -SI  --     Time 2  5s  -SI  --     Additional Comments  HEP  -SI  --        Exercise 3    Exercise Name 3  Scap squeeze  -SI  --     Cueing 3  Verbal;Demo  -SI  --     Reps 3  10  -SI  --     Time 3  5  -SI  --        Exercise 4    Exercise Name 4  Post shoulder rolls  -SI  --     Cueing 4  Verbal;Demo  -SI  --     Reps 4  10  -SI  --     Time 4  5  -SI  --        Exercise 5    Exercise Name 5  Pendulums  -SI  --     Cueing 5  Verbal  -SI  --     Time 5  2 min  -SI  --        Exercise 6    Exercise Name 6  TAble slides for flexion and scaption  -SI  --     Sets 6  1  -SI  --     Reps 6  15  -SI  --     Time 6  10  -SI  --     Additional Comments  HEP  -SI  --        Exercise 7    Exercise Name 7  suupine bilat press up with cane  -SI  --     Sets 7  1  -SI  --     Reps 7  15  -SI  --     Additional Comments  HEP  -SI  --       User Key  (r) = Recorded By, (t) = Taken By, (c) = Cosigned By    Initials Name Provider Type    Effie Jalloh PTA Physical Therapy Assistant                       Manual Rx (last 36 hours)      Manual Treatments     Row Name 10/07/20 0700             Total Minutes    39664 - PT Manual Therapy Minutes  25  -SI         Manual Rx 1    Manual Rx 1 Location  L shoulder  -SI      Manual Rx 1 Type  PROM, oscillations to G-H jt  -SI      Manual Rx 1 Grade  gentle  -SI      Manual Rx 1 Duration  25  -SI        User Key  (r) = Recorded By, (t) = Taken By, (c) = Cosigned By    Initials Name Provider Type    Effie Jalloh PTA Physical Therapy Assistant              Therapy Education  Education Details: Long discussion on phase of reatment and sticking with protocol as he tends to do too much admittingly.  Given: HEP, Symptoms/condition management  Program: New, Reinforced, Progressed  How Provided: Verbal, Demonstration, Written  Provided to: Patient  Level of Understanding: Teach back education performed              Time Calculation:   Start Time: 0745  Stop Time: 0830  Time Calculation (min): 45 min  Total Timed Code Minutes- PT: 45 minute(s)  Therapy Charges for Today     Code Description Service Date Service Provider Modifiers Qty    92912613010 HC PT THER PROC EA 15 MIN 10/7/2020 Effie Razo PTA GP 1    99611114097 HC PT MANUAL THERAPY EA 15 MIN 10/7/2020 Effie Razo PTA GP 2                    Effie Razo PTA  10/7/2020

## 2020-10-09 ENCOUNTER — HOSPITAL ENCOUNTER (OUTPATIENT)
Dept: PHYSICAL THERAPY | Facility: HOSPITAL | Age: 85
Setting detail: THERAPIES SERIES
Discharge: HOME | End: 2020-10-09

## 2020-10-09 DIAGNOSIS — M25.512 ACUTE PAIN OF LEFT SHOULDER: Primary | ICD-10-CM

## 2020-10-09 DIAGNOSIS — Z47.89 ORTHOPEDIC AFTERCARE: ICD-10-CM

## 2020-10-09 DIAGNOSIS — M25.611 DECREASED RIGHT SHOULDER RANGE OF MOTION: ICD-10-CM

## 2020-10-09 PROCEDURE — 97140 MANUAL THERAPY 1/> REGIONS: CPT

## 2020-10-09 PROCEDURE — 97110 THERAPEUTIC EXERCISES: CPT

## 2020-10-09 NOTE — THERAPY TREATMENT NOTE
Outpatient Physical Therapy Ortho Treatment Note  [facility]     Patient Name: Josue Rothman  : 1935  MRN: [id number]  Today's Date: 10/9/2020      Visit Date: 10/09/2020    Visit Dx:    ICD-10-CM ICD-9-CM   1. Acute pain of left shoulder  M25.512 719.41   2. Decreased right shoulder range of motion  M25.611 719.51   3. Orthopedic aftercare  Z47.89 V54.9       Patient Active Problem List   Diagnosis   • Bilateral shoulder pain   • Shoulder arthritis        Past Medical History:   Diagnosis Date   • Afib (CMS/[facility]) 2018   • Arthritis     OSTEO   • Elevated cholesterol    • Hyperlipidemia    • Left shoulder pain    • Limited range of motion (ROM) of shoulder    • Renal disorder         Past Surgical History:   Procedure Laterality Date   • BACK SURGERY  2009    RODS & SCREWS   • HERNIA REPAIR     • KIDNEY STONE SURGERY     • TOTAL SHOULDER ARTHROPLASTY W/ DISTAL CLAVICLE EXCISION Left 9/10/2020    Procedure: TOTAL SHOULDER REVERSE ARTHROPLASTY;  Surgeon: Jayden Holbrook MD;  Location: [facility];  Service: Orthopedics;  Laterality: Left;       PT Ortho     Row Name 10/09/20 0700       Subjective Pain    Able to rate subjective pain?  yes  -SI    Subjective Pain Comment  no pain at rest and .5 he states at times with activity  -SI       Left Upper Ext    Lt Shoulder Abduction PROM  90  -SI    Lt Shoulder Flexion PROM  133 tight  -SI    Lt Shoulder External Rotation PROM  50 sub-acute pain end range  -SI    Lt Shoulder Internal Rotation PROM  60  -SI      User Key  (r) = Recorded By, (t) = Taken By, (c) = Cosigned By    Initials Name Provider Type    SI Effie Razo, PTA Physical Therapy Assistant                      PT Assessment/Plan     Row Name 10/09/20 0738          PT Assessment    Assessment Comments  4 weks post-op yesterday.  Pt progressing well with P-AAROM and sub-maximal isometrics added today  -SI       User Key  (r) = Recorded By, (t) = Taken By, (c) = Cosigned By     Initials Name Provider Type    Effie JallohWAGNER Physical Therapy Assistant          Modalities     Row Name 10/09/20 0700             Ice    Ice Applied  Yes  -SI      Location  L shoulder  -SI      Rx Minutes  10 mins  -SI      Ice S/P Rx  Yes  -SI        User Key  (r) = Recorded By, (t) = Taken By, (c) = Cosigned By    Initials Name Provider Type    Effie JallohWAGNER Physical Therapy Assistant        OP Exercises     Row Name 10/09/20 0700             Subjective Pain    Able to rate subjective pain?  yes  -SI      Subjective Pain Comment  no pain at rest and .5 he states at times with activity  -SI         Total Minutes    36914 - PT Therapeutic Exercise Minutes  20  -SI      75658 - PT Manual Therapy Minutes  25  -SI         Exercise 1    Exercise Name 1  Supine AAROM flex  with cane  -SI      Cueing 1  Verbal;Demo  -SI      Reps 1  7  -SI      Time 1  5  -SI         Exercise 2    Exercise Name 2  Supine ER with dowel  -SI      Cueing 2  Verbal;Tactile;Demo  -SI      Reps 2  7  -SI      Time 2  5s  -SI         Exercise 3    Exercise Name 3  Scap squeeze  -SI      Cueing 3  Verbal;Demo  -SI      Reps 3  10  -SI      Time 3  5  -SI         Exercise 4    Exercise Name 4  Post shoulder rolls  -SI      Cueing 4  Verbal;Demo  -SI      Reps 4  10  -SI      Time 4  5  -SI         Exercise 5    Exercise Name 5  Pendulums  -SI      Cueing 5  Verbal  -SI      Time 5  2 min  -SI         Exercise 6    Exercise Name 6  TAble slides for flexion and scaption  -SI      Sets 6  1  -SI      Reps 6  15  -SI      Time 6  10  -SI         Exercise 7    Exercise Name 7  suupine bilat press up with cane  -SI      Sets 7  1  -SI      Reps 7  15  -SI         Exercise 8    Exercise Name 8  Sh pulley for flexion as tolerated and scaption to 90 only  -SI      Time 8  4 min  -SI         Exercise 9    Exercise Name 9  submax isometric deltoid and IR and ER  -SI      Sets 9  1  -SI      Reps 9  10 each  -SI        User Key  (r) =  Recorded By, (t) = Taken By, (c) = Cosigned By    Initials Name Provider Type    Effie Jalloh PTA Physical Therapy Assistant                      Manual Rx (last 36 hours)      Manual Treatments     Row Name 10/09/20 0700             Total Minutes    52541 - PT Manual Therapy Minutes  25  -SI         Manual Rx 1    Manual Rx 1 Location  L shoulder  -SI      Manual Rx 1 Type  PROM, oscillations to G-H jt  -SI      Manual Rx 1 Grade  manual rythmic stab  -SI      Manual Rx 1 Duration  25  -SI        User Key  (r) = Recorded By, (t) = Taken By, (c) = Cosigned By    Initials Name Provider Type    Effie Jalloh PTA Physical Therapy Assistant          PT OP Goals     Row Name 10/09/20 0700          PT Short Term Goals    STG 1  Pt will be independent with initial HEP for symptom management.  -SI     STG 1 Progress  Met  -SI     STG 2  Pt will be compliant with precautions including sling usage and ROM restrictions in order to reduce potential of dislocation.   -SI     STG 2 Progress  Ongoing  -SI       User Key  (r) = Recorded By, (t) = Taken By, (c) = Cosigned By    Initials Name Provider Type    Effie Jalloh PTA Physical Therapy Assistant          Therapy Education  Given: HEP, Symptoms/condition management, Pain management  Program: Progressed  How Provided: Verbal, Demonstration, Written  Provided to: Patient  Level of Understanding: Teach back education performed              Time Calculation:   Start Time: 0700  Stop Time: 0755  Time Calculation (min): 55 min  Total Timed Code Minutes- PT: 45 minute(s)  Therapy Charges for Today     Code Description Service Date Service Provider Modifiers Qty    07936740550 HC PT THER PROC EA 15 MIN 10/9/2020 Effie Razo PTA GP 1    23136117607 HC PT MANUAL THERAPY EA 15 MIN 10/9/2020 Effie Razo PTA GP 2                    Effie Razo PTA  10/9/2020

## 2020-10-14 ENCOUNTER — HOSPITAL ENCOUNTER (OUTPATIENT)
Dept: PHYSICAL THERAPY | Facility: HOSPITAL | Age: 85
Setting detail: THERAPIES SERIES
Discharge: HOME | End: 2020-10-14

## 2020-10-14 DIAGNOSIS — Z47.89 ORTHOPEDIC AFTERCARE: ICD-10-CM

## 2020-10-14 DIAGNOSIS — M25.512 ACUTE PAIN OF LEFT SHOULDER: Primary | ICD-10-CM

## 2020-10-14 DIAGNOSIS — M25.611 DECREASED RIGHT SHOULDER RANGE OF MOTION: ICD-10-CM

## 2020-10-14 PROCEDURE — 97140 MANUAL THERAPY 1/> REGIONS: CPT

## 2020-10-14 PROCEDURE — 97110 THERAPEUTIC EXERCISES: CPT

## 2020-10-14 NOTE — THERAPY TREATMENT NOTE
Outpatient Physical Therapy Ortho Treatment Note  [facility]     Patient Name: Josue Rothman  : 1935  MRN: [id number]  Today's Date: 10/14/2020      Visit Date: 10/14/2020    Visit Dx:    ICD-10-CM ICD-9-CM   1. Acute pain of left shoulder  M25.512 719.41   2. Decreased right shoulder range of motion  M25.611 719.51   3. Orthopedic aftercare  Z47.89 V54.9       Patient Active Problem List   Diagnosis   • Bilateral shoulder pain   • Shoulder arthritis        Past Medical History:   Diagnosis Date   • Afib (CMS/[facility]) 2018   • Arthritis     OSTEO   • Elevated cholesterol    • Hyperlipidemia    • Left shoulder pain    • Limited range of motion (ROM) of shoulder    • Renal disorder         Past Surgical History:   Procedure Laterality Date   • BACK SURGERY  2009    RODS & SCREWS   • HERNIA REPAIR     • KIDNEY STONE SURGERY     • TOTAL SHOULDER ARTHROPLASTY W/ DISTAL CLAVICLE EXCISION Left 9/10/2020    Procedure: TOTAL SHOULDER REVERSE ARTHROPLASTY;  Surgeon: Jayden Holbrook MD;  Location: [facility];  Service: Orthopedics;  Laterality: Left;       PT Ortho     Row Name 10/14/20 0700       Subjective Comments    Subjective Comments  Pt with minimal complaints  -SI       Subjective Pain    Able to rate subjective pain?  yes  -SI    Subjective Pain Comment  min to no c/o pain left shoulder. Right shoulder hurts more thann left now  -SI       Left Upper Ext    Lt Shoulder Flexion PROM  140 and tight  -SI    Lt Shoulder External Rotation PROM  58 sub-acutepain  -SI    Lt Shoulder Internal Rotation PROM  65  -SI      User Key  (r) = Recorded By, (t) = Taken By, (c) = Cosigned By    Initials Name Provider Type    Effie Jalloh PTA Physical Therapy Assistant                      PT Assessment/Plan     Row Name 10/14/20 7642          PT Assessment    Assessment Comments  Pt will be 5 weeks post-op tomorrow.  PROM improving gradually.  Pt doing well with AAROM ex and sub-maximal isometrics.   Wearing sling when oob.  -SI       User Key  (r) = Recorded By, (t) = Taken By, (c) = Cosigned By    Initials Name Provider Type    SI Effie Razo, PTA Physical Therapy Assistant            OP Exercises     Row Name 10/14/20 0700             Subjective Comments    Subjective Comments  Pt with minimal complaints  -SI         Subjective Pain    Able to rate subjective pain?  yes  -SI      Subjective Pain Comment  min to no c/o pain left shoulder. Right shoulder hurts more thann left now  -SI         Total Minutes    48743 - PT Therapeutic Exercise Minutes  25  -SI      77290 - PT Manual Therapy Minutes  20  -SI         Exercise 1    Exercise Name 1  Supine AAROM flex  with cane  -SI      Cueing 1  Verbal;Demo  -SI      Reps 1  7  -SI      Time 1  5  -SI         Exercise 2    Exercise Name 2  Supine ER with dowel  -SI      Cueing 2  Verbal;Tactile;Demo  -SI      Reps 2  7  -SI      Time 2  5s  -SI         Exercise 3    Exercise Name 3  Scap squeeze  -SI      Cueing 3  Verbal;Demo  -SI      Reps 3  10  -SI      Time 3  5  -SI         Exercise 4    Exercise Name 4  Post shoulder rolls  -SI      Cueing 4  Verbal;Demo  -SI      Reps 4  10  -SI      Time 4  5  -SI         Exercise 5    Exercise Name 5  Pendulums  -SI      Cueing 5  Verbal  -SI      Time 5  2 min  -SI         Exercise 6    Exercise Name 6  TAble slides for flexion and scaption  -SI      Sets 6  1  -SI      Reps 6  15  -SI      Time 6  10  -SI         Exercise 7    Exercise Name 7  suupine bilat press up with cane  -SI      Sets 7  1  -SI      Reps 7  15  -SI         Exercise 8    Exercise Name 8  Sh pulley for flexion as tolerated and scaption to 90 only  -SI      Time 8  4 min  -SI         Exercise 9    Exercise Name 9  submax isometric deltoid and IR and ER  -SI      Sets 9  1  -SI      Reps 9  10 each  -SI         Exercise 10    Exercise Name 10  UBE (AA)  -SI      Time 10  4 min  -SI        User Key  (r) = Recorded By, (t) = Taken By, (c) = Cosigned  By    Initials Name Provider Type    Effie Jalloh PTA Physical Therapy Assistant                      Manual Rx (last 36 hours)      Manual Treatments     Row Name 10/14/20 0700             Total Minutes    64485 - PT Manual Therapy Minutes  20  -SI         Manual Rx 1    Manual Rx 1 Location  L shoulder  -SI      Manual Rx 1 Type  PROM, oscillations to G-H jt  -SI      Manual Rx 1 Grade  manual rythmic stab  -SI      Manual Rx 1 Duration  20  -SI        User Key  (r) = Recorded By, (t) = Taken By, (c) = Cosigned By    Initials Name Provider Type    Effie Jalloh PTA Physical Therapy Assistant          PT OP Goals     Row Name 10/14/20 0800          PT Short Term Goals    STG 2  Pt will be compliant with precautions including sling usage and ROM restrictions in order to reduce potential of dislocation.   -SI     STG 2 Progress  Met  -SI       User Key  (r) = Recorded By, (t) = Taken By, (c) = Cosigned By    Initials Name Provider Type    Effie Jalloh PTA Physical Therapy Assistant          Therapy Education  Given: HEP, Symptoms/condition management  Program: Progressed  How Provided: Verbal, Demonstration, Written  Provided to: Patient  Level of Understanding: Teach back education performed              Time Calculation:   Start Time: 0745  Stop Time: 0830  Time Calculation (min): 45 min  Total Timed Code Minutes- PT: 45 minute(s)  Therapy Charges for Today     Code Description Service Date Service Provider Modifiers Qty    57885798617 HC PT THER PROC EA 15 MIN 10/14/2020 Effie Razo PTA GP 2    81881192836 HC PT MANUAL THERAPY EA 15 MIN 10/14/2020 Effie Razo PTA GP 1                    Effie Razo PTA  10/14/2020

## 2020-10-16 ENCOUNTER — HOSPITAL ENCOUNTER (OUTPATIENT)
Dept: PHYSICAL THERAPY | Facility: HOSPITAL | Age: 85
Setting detail: THERAPIES SERIES
Discharge: HOME | End: 2020-10-16

## 2020-10-16 DIAGNOSIS — M25.512 ACUTE PAIN OF LEFT SHOULDER: Primary | ICD-10-CM

## 2020-10-16 DIAGNOSIS — M25.611 DECREASED RIGHT SHOULDER RANGE OF MOTION: ICD-10-CM

## 2020-10-16 DIAGNOSIS — Z47.89 ORTHOPEDIC AFTERCARE: ICD-10-CM

## 2020-10-16 PROCEDURE — 97110 THERAPEUTIC EXERCISES: CPT

## 2020-10-16 PROCEDURE — 97140 MANUAL THERAPY 1/> REGIONS: CPT

## 2020-10-16 NOTE — THERAPY TREATMENT NOTE
Outpatient Physical Therapy Ortho Treatment Note  [facility]     Patient Name: Josue Rothman  : 1935  MRN: [id number]  Today's Date: 10/16/2020      Visit Date: 10/16/2020    Visit Dx:    ICD-10-CM ICD-9-CM   1. Acute pain of left shoulder  M25.512 719.41   2. Decreased right shoulder range of motion  M25.611 719.51   3. Orthopedic aftercare  Z47.89 V54.9       Patient Active Problem List   Diagnosis   • Bilateral shoulder pain   • Shoulder arthritis        Past Medical History:   Diagnosis Date   • Afib (CMS/[facility]) 2018   • Arthritis     OSTEO   • Elevated cholesterol    • Hyperlipidemia    • Left shoulder pain    • Limited range of motion (ROM) of shoulder    • Renal disorder         Past Surgical History:   Procedure Laterality Date   • BACK SURGERY  2009    RODS & SCREWS   • HERNIA REPAIR     • KIDNEY STONE SURGERY     • TOTAL SHOULDER ARTHROPLASTY W/ DISTAL CLAVICLE EXCISION Left 9/10/2020    Procedure: TOTAL SHOULDER REVERSE ARTHROPLASTY;  Surgeon: Jayden Holbrook MD;  Location: [facility];  Service: Orthopedics;  Laterality: Left;       PT Ortho     Row Name 10/16/20 0700       Subjective Comments    Subjective Comments  sore with the ER stretc he demonstrates, aftere last session.  -SI       Subjective Pain    Able to rate subjective pain?  yes  -SI    Pre-Treatment Pain Level  1  -SI    Subjective Pain Comment  1 pain this AM and points to biceps region  -SI       Left Upper Ext    Lt Shoulder Abduction PROM  108  -SI    Lt Shoulder Flexion PROM  133  -SI    Lt Shoulder External Rotation PROM  55  -SI    Lt Shoulder Internal Rotation PROM  65  -SI    Lt Upper Extremity Comments   left elbow extension  -12 degrees  -SI      User Key  (r) = Recorded By, (t) = Taken By, (c) = Cosigned By    Initials Name Provider Type    SI Effie Razo PTA Physical Therapy Assistant                      PT Assessment/Plan     Row Name 10/16/20 4547          PT Assessment    Assessment  Comments  pt is 5.5 weeks post left RTSA.  Today he has a little pain and shoulder a little tighter than last meaured.  No changes in ex in PT or HEP.  Pt to focus on light stretching  -SI       User Key  (r) = Recorded By, (t) = Taken By, (c) = Cosigned By    Initials Name Provider Type    Effie Jalloh, PTA Physical Therapy Assistant            OP Exercises     Row Name 10/16/20 0700             Subjective Comments    Subjective Comments  sore with the ER stretc he demonstrates, aftere last session.  -SI         Subjective Pain    Able to rate subjective pain?  yes  -SI      Pre-Treatment Pain Level  1  -SI      Subjective Pain Comment  1 pain this AM and points to biceps region  -SI         Total Minutes    34744 - PT Therapeutic Exercise Minutes  25  -SI      42871 - PT Manual Therapy Minutes  20  -SI         Exercise 1    Exercise Name 1  Supine AAROM flex  with cane  -SI      Cueing 1  Verbal;Demo  -SI      Reps 1  7  -SI      Time 1  5  -SI         Exercise 2    Exercise Name 2  Supine ER with dowel  -SI      Cueing 2  Verbal;Tactile;Demo  -SI      Reps 2  7  -SI      Time 2  5s  -SI         Exercise 3    Exercise Name 3  Scap squeeze  -SI      Cueing 3  Verbal;Demo  -SI      Reps 3  10  -SI      Time 3  5  -SI         Exercise 4    Exercise Name 4  Post shoulder rolls  -SI      Cueing 4  Verbal;Demo  -SI      Reps 4  10  -SI      Time 4  5  -SI         Exercise 5    Exercise Name 5  Pendulums  -SI      Cueing 5  Verbal  -SI      Time 5  2 min  -SI         Exercise 6    Exercise Name 6  TAble slides for flexion and scaption  -SI      Sets 6  1  -SI      Reps 6  15  -SI      Time 6  10  -SI         Exercise 7    Exercise Name 7  suupine bilat press up with cane  -SI      Sets 7  1  -SI      Reps 7  15  -SI         Exercise 8    Exercise Name 8  Sh pulley for flexion as tolerated and scaption to 90 only  -SI      Time 8  4 min  -SI         Exercise 9    Exercise Name 9  submax isometric deltoid and IR and  ER  -SI      Sets 9  1  -SI      Reps 9  10 each  -SI         Exercise 10    Exercise Name 10  UBE (AA)  -SI      Time 10  4 min  -SI        User Key  (r) = Recorded By, (t) = Taken By, (c) = Cosigned By    Initials Name Provider Type    Effie Jalloh PTA Physical Therapy Assistant                      Manual Rx (last 36 hours)      Manual Treatments     Row Name 10/16/20 0700             Total Minutes    43511 - PT Manual Therapy Minutes  20  -SI         Manual Rx 1    Manual Rx 1 Location  L shoulder  -SI      Manual Rx 1 Type  PROM, oscillations to G-H jt  -SI      Manual Rx 1 Grade  manual rythmic stab  -SI      Manual Rx 1 Duration  20  -SI        User Key  (r) = Recorded By, (t) = Taken By, (c) = Cosigned By    Initials Name Provider Type    Effie Jalloh PTA Physical Therapy Assistant          PT OP Goals     Row Name 10/16/20 0700          Long Term Goals    LTG 1  Pt will be independent and compliant with advanced HEP for long term management of symptoms and prevention of future occurrence.   -SI     LTG 1 Progress  Ongoing  -SI       User Key  (r) = Recorded By, (t) = Taken By, (c) = Cosigned By    Initials Name Provider Type    Effie Jalloh PTA Physical Therapy Assistant          Therapy Education  Given: HEP, Symptoms/condition management  Program: Reinforced  How Provided: Verbal, Demonstration, Written  Provided to: Patient  Level of Understanding: Teach back education performed              Time Calculation:   Start Time: 0700  Stop Time: 0745  Time Calculation (min): 45 min  Total Timed Code Minutes- PT: 45 minute(s)  Therapy Charges for Today     Code Description Service Date Service Provider Modifiers Qty    74439174567 HC PT THER PROC EA 15 MIN 10/16/2020 Effie Razo PTA GP 2    38164862950 HC PT MANUAL THERAPY EA 15 MIN 10/16/2020 Effie Razo PTA GP 1                    Effie Razo PTA  10/16/2020

## 2020-10-21 ENCOUNTER — HOSPITAL ENCOUNTER (OUTPATIENT)
Dept: PHYSICAL THERAPY | Facility: HOSPITAL | Age: 85
Setting detail: THERAPIES SERIES
Discharge: HOME | End: 2020-10-21

## 2020-10-21 DIAGNOSIS — Z47.89 ORTHOPEDIC AFTERCARE: ICD-10-CM

## 2020-10-21 DIAGNOSIS — M25.611 DECREASED RIGHT SHOULDER RANGE OF MOTION: ICD-10-CM

## 2020-10-21 DIAGNOSIS — M25.512 ACUTE PAIN OF LEFT SHOULDER: Primary | ICD-10-CM

## 2020-10-21 PROCEDURE — 97110 THERAPEUTIC EXERCISES: CPT

## 2020-10-21 PROCEDURE — 97140 MANUAL THERAPY 1/> REGIONS: CPT

## 2020-10-21 NOTE — THERAPY TREATMENT NOTE
Outpatient Physical Therapy Ortho Treatment Note  [facility]     Patient Name: Josue Rothman  : 1935  MRN: [id number]  Today's Date: 10/21/2020      Visit Date: 10/21/2020    Visit Dx:    ICD-10-CM ICD-9-CM   1. Acute pain of left shoulder  M25.512 719.41   2. Decreased right shoulder range of motion  M25.611 719.51   3. Orthopedic aftercare  Z47.89 V54.9       Patient Active Problem List   Diagnosis   • Bilateral shoulder pain   • Shoulder arthritis        Past Medical History:   Diagnosis Date   • Afib (CMS/[facility]) 2018   • Arthritis     OSTEO   • Elevated cholesterol    • Hyperlipidemia    • Left shoulder pain    • Limited range of motion (ROM) of shoulder    • Renal disorder         Past Surgical History:   Procedure Laterality Date   • BACK SURGERY  2009    RODS & SCREWS   • HERNIA REPAIR     • KIDNEY STONE SURGERY     • TOTAL SHOULDER ARTHROPLASTY W/ DISTAL CLAVICLE EXCISION Left 9/10/2020    Procedure: TOTAL SHOULDER REVERSE ARTHROPLASTY;  Surgeon: Jayden Holbrook MD;  Location: [facility];  Service: Orthopedics;  Laterality: Left;       PT Ortho     Row Name 10/21/20 0700       Subjective Comments    Subjective Comments  states he RTMD Friday and going to discuss if able to get Flu shot Min to no complaints of pain left shoulder.  Now better than my right arm  -SI       Subjective Pain    Able to rate subjective pain?  yes  -SI      User Key  (r) = Recorded By, (t) = Taken By, (c) = Cosigned By    Initials Name Provider Type    Effie Jalloh PTA Physical Therapy Assistant                      PT Assessment/Plan     Row Name 10/21/20 0766          PT Assessment    Assessment Comments  Tight with PROM.  Will be 6 weeks post-op tomorrow.  RTMD end of week  -SI       User Key  (r) = Recorded By, (t) = Taken By, (c) = Cosigned By    Initials Name Provider Type    Effie Jalloh PTA Physical Therapy Assistant            OP Exercises     Row Name 10/21/20 7729              Subjective Comments    Subjective Comments  states he RTMD Friday and going to discuss if able to get Flu shot Min to no complaints of pain left shoulder.  Now better than my right arm  -SI         Subjective Pain    Able to rate subjective pain?  yes  -SI         Total Minutes    57195 - PT Therapeutic Exercise Minutes  25  -SI      08403 - PT Manual Therapy Minutes  20  -SI         Exercise 1    Exercise Name 1  Supine AAROM flex  with cane  -SI      Cueing 1  Verbal;Demo  -SI      Reps 1  7  -SI      Time 1  5  -SI         Exercise 2    Exercise Name 2  Supine ER with dowel  -SI      Cueing 2  Verbal;Tactile;Demo  -SI      Reps 2  7  -SI      Time 2  5s  -SI         Exercise 3    Exercise Name 3  Scap squeeze  -SI      Cueing 3  Verbal;Demo  -SI      Reps 3  10  -SI      Time 3  5  -SI         Exercise 4    Exercise Name 4  Post shoulder rolls  -SI      Cueing 4  Verbal;Demo  -SI      Reps 4  10  -SI      Time 4  5  -SI         Exercise 5    Exercise Name 5  Pendulums  -SI      Cueing 5  Verbal  -SI      Time 5  2 min  -SI         Exercise 6    Exercise Name 6  TAble slides for flexion and scaption  -SI      Sets 6  1  -SI      Reps 6  15  -SI      Time 6  10  -SI         Exercise 7    Exercise Name 7  suupine bilat press up with cane  -SI      Sets 7  1  -SI      Reps 7  15  -SI         Exercise 8    Exercise Name 8  Sh pulley for flexion as tolerated and scaption to 90 only  -SI      Time 8  4 min  -SI         Exercise 9    Exercise Name 9  submax isometric deltoid and IR and ER  -SI      Sets 9  1  -SI      Reps 9  10 each  -SI      Additional Comments  manual  -SI         Exercise 10    Exercise Name 10  UBE (AA)  -SI      Time 10  4 min  -SI         Exercise 11    Exercise Name 11  standing AA Sh flex with cane  -SI      Reps 11  10  -SI         Exercise 12    Exercise Name 12  standing AA abd with cane  -SI      Reps 12  10  -SI        User Key  (r) = Recorded By, (t) = Taken By, (c) = Cosigned By     Initials Name Provider Type    Effie Jalloh PTA Physical Therapy Assistant                      Manual Rx (last 36 hours)      Manual Treatments     Row Name 10/21/20 0700             Total Minutes    20330 - PT Manual Therapy Minutes  20  -SI         Manual Rx 1    Manual Rx 1 Location  L shoulder  -SI      Manual Rx 1 Type  PROM, oscillations to G-H jt  -SI      Manual Rx 1 Grade  manual rythmic stab  -SI      Manual Rx 1 Duration  20  -SI        User Key  (r) = Recorded By, (t) = Taken By, (c) = Cosigned By    Initials Name Provider Type    Effie Jalloh PTA Physical Therapy Assistant              Therapy Education  Given: HEP, Symptoms/condition management  Program: Progressed  How Provided: Verbal, Demonstration, Written  Provided to: Patient  Level of Understanding: Teach back education performed              Time Calculation:   Start Time: 0700  Stop Time: 0745  Time Calculation (min): 45 min  Total Timed Code Minutes- PT: 45 minute(s)  Therapy Charges for Today     Code Description Service Date Service Provider Modifiers Qty    88038038832 HC PT THER PROC EA 15 MIN 10/21/2020 Effie Razo PTA GP 2    45570206359 HC PT MANUAL THERAPY EA 15 MIN 10/21/2020 Effie Razo PTA GP 1                    Effie Razo PTA  10/21/2020

## 2020-10-23 ENCOUNTER — OFFICE VISIT (OUTPATIENT)
Dept: ORTHOPEDIC SURGERY | Facility: CLINIC | Age: 85
End: 2020-10-23

## 2020-10-23 ENCOUNTER — HOSPITAL ENCOUNTER (OUTPATIENT)
Dept: PHYSICAL THERAPY | Facility: HOSPITAL | Age: 85
Setting detail: THERAPIES SERIES
Discharge: HOME | End: 2020-10-23

## 2020-10-23 VITALS — TEMPERATURE: 97.3 F | HEIGHT: 69 IN | BODY MASS INDEX: 26.66 KG/M2 | WEIGHT: 180 LBS

## 2020-10-23 DIAGNOSIS — M25.611 DECREASED RIGHT SHOULDER RANGE OF MOTION: ICD-10-CM

## 2020-10-23 DIAGNOSIS — M25.512 ACUTE PAIN OF LEFT SHOULDER: Primary | ICD-10-CM

## 2020-10-23 DIAGNOSIS — Z96.612 STATUS POST REVERSE TOTAL REPLACEMENT OF LEFT SHOULDER: Primary | ICD-10-CM

## 2020-10-23 DIAGNOSIS — Z47.89 ORTHOPEDIC AFTERCARE: ICD-10-CM

## 2020-10-23 DIAGNOSIS — Z09 SURGERY FOLLOW-UP: ICD-10-CM

## 2020-10-23 PROCEDURE — 97140 MANUAL THERAPY 1/> REGIONS: CPT

## 2020-10-23 PROCEDURE — 99024 POSTOP FOLLOW-UP VISIT: CPT | Performed by: ORTHOPAEDIC SURGERY

## 2020-10-23 PROCEDURE — 97110 THERAPEUTIC EXERCISES: CPT

## 2020-10-23 PROCEDURE — 73030 X-RAY EXAM OF SHOULDER: CPT | Performed by: ORTHOPAEDIC SURGERY

## 2020-10-23 RX ORDER — CEPHALEXIN 500 MG/1
CAPSULE ORAL
Qty: 4 CAPSULE | Refills: 2 | Status: SHIPPED | OUTPATIENT
Start: 2020-10-23 | End: 2020-12-29

## 2020-10-23 NOTE — THERAPY TREATMENT NOTE
"    Outpatient Physical Therapy Ortho Treatment Note  [facility]     Patient Name: Josue Rothman  : 1935  MRN: [id number]  Today's Date: 10/23/2020      Visit Date: 10/23/2020    Visit Dx:    ICD-10-CM ICD-9-CM   1. Acute pain of left shoulder  M25.512 719.41   2. Decreased right shoulder range of motion  M25.611 719.51   3. Orthopedic aftercare  Z47.89 V54.9       Patient Active Problem List   Diagnosis   • Bilateral shoulder pain   • Shoulder arthritis        Past Medical History:   Diagnosis Date   • Afib (CMS/[facility]) 2018   • Arthritis     OSTEO   • Elevated cholesterol    • Hyperlipidemia    • Left shoulder pain    • Limited range of motion (ROM) of shoulder    • Renal disorder         Past Surgical History:   Procedure Laterality Date   • BACK SURGERY  2009    RODS & SCREWS   • HERNIA REPAIR     • KIDNEY STONE SURGERY     • TOTAL SHOULDER ARTHROPLASTY W/ DISTAL CLAVICLE EXCISION Left 9/10/2020    Procedure: TOTAL SHOULDER REVERSE ARTHROPLASTY;  Surgeon: Jayden Holbrook MD;  Location: [facility];  Service: Orthopedics;  Laterality: Left;       PT Ortho     Row Name 10/23/20 0700       Subjective Comments    Subjective Comments  Pt with min to no complaints.    -SI       Subjective Pain    Able to rate subjective pain?  yes  -SI    Pre-Treatment Pain Level  1  -SI    Subjective Pain Comment  No \"serious pain\"  -SI       Left Upper Ext    Lt Shoulder Abduction PROM  115  -SI    Lt Shoulder Flexion PROM  138  -SI    Lt Shoulder External Rotation PROM  60  -SI    Lt Shoulder Internal Rotation PROM  65  -SI      User Key  (r) = Recorded By, (t) = Taken By, (c) = Cosigned By    Initials Name Provider Type    Effie Jalloh PTA Physical Therapy Assistant                      PT Assessment/Plan     Row Name 10/23/20 0730          PT Assessment    Assessment Comments  Pt 6 weeks post-op yesterday and apperas to be progressing as exptected.  He has a high pain tolerance and cautioned not " "to WB on LUE or reach behind his back.  RTMD today  -SI       User Key  (r) = Recorded By, (t) = Taken By, (c) = Cosigned By    Initials Name Provider Type    Effie Jalloh, PTA Physical Therapy Assistant            OP Exercises     Row Name 10/23/20 0700             Subjective Comments    Subjective Comments  Pt with min to no complaints.    -SI         Subjective Pain    Able to rate subjective pain?  yes  -SI      Pre-Treatment Pain Level  1  -SI      Subjective Pain Comment  No \"serious pain\"  -SI         Total Minutes    78966 - PT Therapeutic Exercise Minutes  25  -SI      14500 - PT Manual Therapy Minutes  20  -SI         Exercise 1    Exercise Name 1  Supine AAROM flex  with cane  -SI      Cueing 1  Verbal;Demo  -SI      Reps 1  7  -SI      Time 1  5  -SI         Exercise 2    Exercise Name 2  Supine ER with dowel  -SI      Cueing 2  Verbal;Tactile;Demo  -SI      Reps 2  7  -SI      Time 2  5s  -SI         Exercise 3    Exercise Name 3  Scap squeeze  -SI      Cueing 3  Verbal;Demo  -SI      Reps 3  10  -SI      Time 3  5  -SI         Exercise 4    Exercise Name 4  Post shoulder rolls  -SI      Cueing 4  Verbal;Demo  -SI      Reps 4  10  -SI      Time 4  5  -SI         Exercise 5    Exercise Name 5  Pendulums  -SI      Cueing 5  Verbal  -SI      Time 5  2 min  -SI         Exercise 6    Exercise Name 6  TAble slides for flexion and scaption  -SI      Sets 6  1  -SI      Reps 6  15  -SI      Time 6  10  -SI         Exercise 7    Exercise Name 7  suupine bilat press up with cane  -SI      Sets 7  1  -SI      Reps 7  15  -SI         Exercise 8    Exercise Name 8  Sh pulley for flexion as tolerated and scaption to 90 only  -SI      Time 8  4 min  -SI         Exercise 9    Exercise Name 9  submax isometric deltoid and IR and ER  -SI      Sets 9  1  -SI      Reps 9  10 each  -SI         Exercise 10    Exercise Name 10  UBE (AA)  -SI      Time 10  4 min  -SI         Exercise 11    Exercise Name 11  standing AA " Sh flex with cane  -SI      Reps 11  10  -SI         Exercise 12    Exercise Name 12  standing AA abd with cane  -SI      Reps 12  10  -SI        User Key  (r) = Recorded By, (t) = Taken By, (c) = Cosigned By    Initials Name Provider Type    Effie Jalloh PTA Physical Therapy Assistant                      Manual Rx (last 36 hours)      Manual Treatments     Row Name 10/23/20 0700             Total Minutes    62527 - PT Manual Therapy Minutes  20  -SI         Manual Rx 1    Manual Rx 1 Location  L shoulder  -SI      Manual Rx 1 Type  PROM, oscillations to G-H jt  -SI      Manual Rx 1 Grade  manual rythmic stab  -SI      Manual Rx 1 Duration  20  -SI        User Key  (r) = Recorded By, (t) = Taken By, (c) = Cosigned By    Initials Name Provider Type    Effie Jalloh PTA Physical Therapy Assistant              Therapy Education  Given: HEP, Symptoms/condition management  Program: Reinforced  How Provided: Verbal, Demonstration, Written  Provided to: Patient  Level of Understanding: Teach back education performed              Time Calculation:   Start Time: 0700  Stop Time: 0745  Time Calculation (min): 45 min  Therapy Charges for Today     Code Description Service Date Service Provider Modifiers Qty    28459564254 HC PT THER PROC EA 15 MIN 10/23/2020 Effie Razo PTA GP 2    47373956549 HC PT MANUAL THERAPY EA 15 MIN 10/23/2020 Effie Razo PTA GP 1                    Effie Razo PTA  10/23/2020

## 2020-10-23 NOTE — PROGRESS NOTES
"Josue Rothman : 1935 MRN: [id number] DATE: 10/23/2020    DIAGNOSIS: 6 week follow up left shoulder arthroplasty      SUBJECTIVE:  Patient returns today for 6 week follow up of left shoulder replacement. Patient reports doing well with no unusual complaints.     OBJECTIVE:    Temp 97.3 °F (36.3 °C)   Ht 175.3 cm (69\")   Wt 81.6 kg (180 lb)   BMI 26.58 kg/m²     Exam: The incision is well healed. No erythema or drainage. Shoulder moves fluidly with pendulums.  Motion is on track per protocol.  The arm is soft and nontender.  Good motor and sensory function.  Palpable distal pulses.     DIAGNOSTIC STUDIES    Xrays: AP and scapular Y views of the left shoulder are ordered and reviewed for evaluation of shoulder replacement.  They demonstrate a well positioned, well aligned replacement without complicating factors noted.  In comparison with previous films there has been no change.    ASSESSMENT: 6 week follow up left shoulder replacement    PLAN:   1.  Continue PT per protocol.  2.  Discontinue sling and begin working on progressing ROM as tolerated.  3.  Counseled patient about appropriate activity modifications and restrictions.  Released to drive at this point.    Jayden Holbrook MD    10/23/2020     "

## 2020-10-28 ENCOUNTER — HOSPITAL ENCOUNTER (OUTPATIENT)
Dept: PHYSICAL THERAPY | Facility: HOSPITAL | Age: 85
Setting detail: THERAPIES SERIES
Discharge: HOME | End: 2020-10-28

## 2020-10-28 ENCOUNTER — TRANSCRIBE ORDERS (OUTPATIENT)
Dept: PHYSICAL THERAPY | Facility: HOSPITAL | Age: 85
End: 2020-10-28

## 2020-10-28 DIAGNOSIS — M25.512 ACUTE PAIN OF LEFT SHOULDER: Primary | ICD-10-CM

## 2020-10-28 DIAGNOSIS — Z96.612 STATUS POST REVERSE TOTAL REPLACEMENT OF LEFT SHOULDER: Primary | ICD-10-CM

## 2020-10-28 DIAGNOSIS — M25.611 DECREASED RIGHT SHOULDER RANGE OF MOTION: ICD-10-CM

## 2020-10-28 DIAGNOSIS — Z47.89 ORTHOPEDIC AFTERCARE: ICD-10-CM

## 2020-10-28 PROCEDURE — 97110 THERAPEUTIC EXERCISES: CPT

## 2020-10-28 PROCEDURE — 97140 MANUAL THERAPY 1/> REGIONS: CPT

## 2020-10-28 NOTE — THERAPY PROGRESS REPORT/RE-CERT
Outpatient Physical Therapy Ortho Re-Assessment  [facility]     Patient Name: Josue Rothman  : 1935  MRN: [id number]  Today's Date: 10/28/2020      Visit Date: 10/28/2020    Patient Active Problem List   Diagnosis   • Bilateral shoulder pain   • Shoulder arthritis        Past Medical History:   Diagnosis Date   • Afib (CMS/[facility]) 2018   • Arthritis     OSTEO   • Elevated cholesterol    • Hyperlipidemia    • Left shoulder pain    • Limited range of motion (ROM) of shoulder    • Renal disorder         Past Surgical History:   Procedure Laterality Date   • BACK SURGERY  2009    RODS & SCREWS   • HERNIA REPAIR     • KIDNEY STONE SURGERY     • TOTAL SHOULDER ARTHROPLASTY W/ DISTAL CLAVICLE EXCISION Left 9/10/2020    Procedure: TOTAL SHOULDER REVERSE ARTHROPLASTY;  Surgeon: Jayden Holbrook MD;  Location: [facility] OR;  Service: Orthopedics;  Laterality: Left;       Visit Dx:     ICD-10-CM ICD-9-CM   1. Acute pain of left shoulder  M25.512 719.41   2. Decreased right shoulder range of motion  M25.611 719.51   3. Orthopedic aftercare  Z47.89 V54.9             PT Ortho     Row Name 10/28/20 0800       Left Upper Ext    Lt Shoulder Abduction AROM  93 slight shoulder hike, seated  -CN    Lt Shoulder Flexion AROM  108 seated  -CN    Lt Shoulder External Rotation AROM  LUCIAN=T3  -CN       MMT Left Upper Ext    Lt Shoulder Flexion MMT, Gross Movement  (3+/5) fair plus  -CN    Lt Shoulder ABduction MMT, Gross Movement  (3/5) fair  -CN    Lt Shoulder Internal Rotation MMT, Gross Movement  (4-/5) good minus  -CN    Lt Shoulder External Rotation MMT, Gross Movement  (3+/5) fair plus  -CN      User Key  (r) = Recorded By, (t) = Taken By, (c) = Cosigned By    Initials Name Provider Type    Sondra Joe, PT Physical Therapist                      Therapy Education  Given: HEP, Symptoms/condition management  Program: Reinforced  How Provided: Verbal, Demonstration  Provided to: Patient  Level of  Understanding: Teach back education performed     PT OP Goals     Row Name 10/28/20 0800          PT Short Term Goals    STG Date to Achieve  10/28/20  -CN     STG 1  Pt will be independent with initial HEP for symptom management.  -CN     STG 1 Progress  Met  -CN     STG 2  Pt will be compliant with precautions including sling usage and ROM restrictions in order to reduce potential of dislocation.   -CN     STG 2 Progress  Met  -CN        Long Term Goals    LTG Date to Achieve  11/28/20  -CN     LTG 1  Pt will be independent and compliant with advanced HEP for long term management of symptoms and prevention of future occurrence.   -CN     LTG 1 Progress  Progressing  -CN     LTG 1 Progress Comments  Progressing strengthening/AAROM per protocol.   -CN     LTG 2  Pt will reduce level of perceived disability as measured by the quick DASH  to 15% in order to improve QOL.  -CN     LTG 2 Progress  Progressing  -CN     LTG 2 Progress Comments  Pt scored 27% where 0% is no disability.   -CN     LTG 3  Pt will demonstrate L shoulder ROM to flex=140, abd=135 and LUCIAN=T1 in order to improve ability to perform ADLs.   -CN     LTG 3 Progress  Progressing  -CN     LTG 3 Progress Comments  See ortho section for AROM details.   -CN     LTG 4  Pt will demonstrate L shoulder strength to 4/5 all planes in order to return to normalized gym activities.   -CN     LTG 4 Progress  Ongoing  -CN     LTG 4 Progress Comments  Pt with decreased strength, all planes with pain into flex/abd.   -CN       User Key  (r) = Recorded By, (t) = Taken By, (c) = Cosigned By    Initials Name Provider Type    Sondra Joe, PT Physical Therapist          PT Assessment/Plan     Row Name 10/28/20 0923          PT Assessment    Functional Limitations  Limitation in home management;Limitations in functional capacity and performance;Performance in leisure activities;Performance in self-care ADL  -CN     Impairments  Impaired flexibility;Muscle  strength;Pain;Posture;Range of motion  -CN     Assessment Comments  Pt has attended 9 skilled therapy sessions for treatment of L shoulder following reverse TSA on 9/10. Pt bia be 7 weeks post op tomorrow and out of sling at this time. Pt with improving AROM, most limited into flex/abd and reporting compliance with current home program. Pt able to perform isometric strengthening and will continue to progres ROM and strength per protocol. Pt reporting minimal limitations with ADLs at this time and discussed importance of minimizing use to L UE to carry groceries, ets within the home. Pt also with R shoulder pain and planning to undergo R reverse TSA in Jan 2021 pending L shoulder recovery. Pt limited with OH ROM and overall shoulder girdle strength and would benefit from continued skilled PT to address the deficits.  -CN     Please refer to paper survey for additional self-reported information  Yes  -CN     Rehab Potential  Excellent  -CN     Patient/caregiver participated in establishment of treatment plan and goals  Yes  -CN     Patient would benefit from skilled therapy intervention  Yes  -CN        PT Plan    PT Frequency  2x/week  -CN     Predicted Duration of Therapy Intervention (PT)  8 visits  -CN     PT Plan Comments  Continue to progress ROM and strengthening per protocol and ensure no WBing through shoulder or lifting at home.  -CN       User Key  (r) = Recorded By, (t) = Taken By, (c) = Cosigned By    Initials Name Provider Type    Sondra Joe, PT Physical Therapist            OP Exercises     Row Name 10/28/20 0700             Subjective Comments    Subjective Comments  It feels pretty good. The doctor is happy with where I am and I am out of the sling now.   -CN         Subjective Pain    Able to rate subjective pain?  yes  -CN      Pre-Treatment Pain Level  1  -CN         Total Minutes    40999 - PT Therapeutic Exercise Minutes  25  -CN      60067 - PT Manual Therapy Minutes  20  -CN          Exercise 1    Exercise Name 1  Supine AAROM flex  with cane  -CN      Cueing 1  Verbal;Demo  -CN      Reps 1  7  -CN      Time 1  5  -CN         Exercise 2    Exercise Name 2  Supine ER with dowel  -CN      Cueing 2  Verbal;Tactile;Demo  -CN      Reps 2  7  -CN      Time 2  5s  -CN         Exercise 3    Exercise Name 3  Scap squeeze  -CN      Cueing 3  Verbal;Demo  -CN      Reps 3  10  -CN      Time 3  5  -CN         Exercise 4    Exercise Name 4  Post shoulder rolls  -CN      Cueing 4  Verbal;Demo  -CN      Reps 4  10  -CN      Time 4  5  -CN         Exercise 6    Exercise Name 6  TAble slides for flexion and scaption  -CN      Sets 6  1  -CN      Reps 6  15  -CN      Time 6  10  -CN         Exercise 7    Exercise Name 7  supine bilat press up with cane  -CN      Sets 7  1  -CN      Reps 7  15  -CN         Exercise 8    Exercise Name 8  Sh pulley for flexion as tolerated and scaption to 90 only  -CN      Time 8  4 min  -CN         Exercise 9    Exercise Name 9  submax isometric deltoid and IR and ER  -CN      Additional Comments  pt to perform at home today  -CN         Exercise 10    Exercise Name 10  UBE (AA)  -CN      Time 10  4 min  -CN         Exercise 11    Exercise Name 11  standing AA Sh flex with cane  -CN      Reps 11  10  -CN         Exercise 12    Exercise Name 12  standing AA abd with cane  -CN      Reps 12  10  -CN        User Key  (r) = Recorded By, (t) = Taken By, (c) = Cosigned By    Initials Name Provider Type    Sondra Joe, PT Physical Therapist           Manual Rx (last 36 hours)      Manual Treatments     Row Name 10/28/20 0700             Total Minutes    11059 - PT Manual Therapy Minutes  20  -CN         Manual Rx 1    Manual Rx 1 Location  L shoulder  -CN      Manual Rx 1 Type  PROM, oscillations to G-H jt  -CN      Manual Rx 1 Grade  manual rythmic stab  -CN      Manual Rx 1 Duration  20  -CN        User Key  (r) = Recorded By, (t) = Taken By, (c) = Cosigned By     Initials Name Provider Type    Sondra Joe, PT Physical Therapist                      Outcome Measure Options: Quick DASH(27% where 0% is no disability)         Time Calculation:     Start Time: 0745  Stop Time: 0830  Time Calculation (min): 45 min     Therapy Charges for Today     Code Description Service Date Service Provider Modifiers Qty    30413863610 HC PT THER PROC EA 15 MIN 10/28/2020 Sondra Banks, PT GP 2    97182926031 HC PT MANUAL THERAPY EA 15 MIN 10/28/2020 Sondra Banks, PT GP 1          PT G-Codes  Outcome Measure Options: Quick DASH(27% where 0% is no disability)         Sondra Banks, ALLYN  10/28/2020

## 2020-10-30 ENCOUNTER — HOSPITAL ENCOUNTER (OUTPATIENT)
Dept: PHYSICAL THERAPY | Facility: HOSPITAL | Age: 85
Setting detail: THERAPIES SERIES
Discharge: HOME | End: 2020-10-30

## 2020-10-30 DIAGNOSIS — M25.512 ACUTE PAIN OF LEFT SHOULDER: Primary | ICD-10-CM

## 2020-10-30 DIAGNOSIS — M25.611 DECREASED RIGHT SHOULDER RANGE OF MOTION: ICD-10-CM

## 2020-10-30 DIAGNOSIS — Z47.89 ORTHOPEDIC AFTERCARE: ICD-10-CM

## 2020-10-30 PROCEDURE — 97110 THERAPEUTIC EXERCISES: CPT

## 2020-10-30 PROCEDURE — 97140 MANUAL THERAPY 1/> REGIONS: CPT

## 2020-10-30 NOTE — THERAPY TREATMENT NOTE
Outpatient Physical Therapy Ortho Treatment Note  [facility]     Patient Name: Josue Rothman  : 1935  MRN: [id number]  Today's Date: 10/30/2020      Visit Date: 10/30/2020    Visit Dx:    ICD-10-CM ICD-9-CM   1. Acute pain of left shoulder  M25.512 719.41   2. Decreased right shoulder range of motion  M25.611 719.51   3. Orthopedic aftercare  Z47.89 V54.9       Patient Active Problem List   Diagnosis   • Bilateral shoulder pain   • Shoulder arthritis        Past Medical History:   Diagnosis Date   • Afib (CMS/[facility]) 2018   • Arthritis     OSTEO   • Elevated cholesterol    • Hyperlipidemia    • Left shoulder pain    • Limited range of motion (ROM) of shoulder    • Renal disorder         Past Surgical History:   Procedure Laterality Date   • BACK SURGERY  2009    RODS & SCREWS   • HERNIA REPAIR     • KIDNEY STONE SURGERY     • TOTAL SHOULDER ARTHROPLASTY W/ DISTAL CLAVICLE EXCISION Left 9/10/2020    Procedure: TOTAL SHOULDER REVERSE ARTHROPLASTY;  Surgeon: Jayden Holbrook MD;  Location: [facility];  Service: Orthopedics;  Laterality: Left;                       PT Assessment/Plan     Row Name 10/30/20 4545          PT Assessment    Assessment Comments  Pt with good tolerance to session overall. Making steady progress with ROM and strength with well controlled pain. Will cont to progress per protocol.  -        PT Plan    PT Plan Comments  cont to progress per protocol  -       User Key  (r) = Recorded By, (t) = Taken By, (c) = Cosigned By    Initials Name Provider Type     Melody Waddell, PT Physical Therapist            OP Exercises     Row Name 10/30/20 0700             Subjective Comments    Subjective Comments  Pt reports he is feeling good overall. Low level pain with movement. Still difficult to reach.   -         Subjective Pain    Able to rate subjective pain?  yes  -      Pre-Treatment Pain Level  1  -         Total Minutes    23069 - PT Therapeutic Exercise  Minutes  25  -JH      45899 - PT Manual Therapy Minutes  20  -JH         Exercise 1    Exercise Name 1  Supine AAROM flex  with cane  -JH      Cueing 1  Verbal;Demo  -JH      Reps 1  7  -JH      Time 1  5  -JH         Exercise 2    Exercise Name 2  Supine ER with dowel  -JH      Cueing 2  Verbal;Tactile;Demo  -JH      Reps 2  7  -JH      Time 2  5s  -JH         Exercise 3    Exercise Name 3  Scap squeeze  -JH      Cueing 3  Verbal;Demo  -JH      Reps 3  10  -JH      Time 3  5  -JH         Exercise 4    Exercise Name 4  Post shoulder rolls  -JH      Cueing 4  Verbal;Demo  -JH      Reps 4  10  -JH      Time 4  5  -JH         Exercise 6    Exercise Name 6  TAble slides for flexion and scaption  -JH      Sets 6  1  -JH      Reps 6  15  -JH      Time 6  10  -JH         Exercise 7    Exercise Name 7  supine bilat press up with cane  -JH      Sets 7  1  -JH      Reps 7  15  -JH         Exercise 8    Exercise Name 8  Sh pulley for flexion as tolerated and scaption to 90 only  -JH      Time 8  4 min  -JH         Exercise 9    Exercise Name 9  submax isometric deltoid and IR and ER  -JH      Sets 9  1  -JH      Reps 9  10ea  -JH         Exercise 10    Exercise Name 10  UBE (AA)  -JH      Time 10  4 min  -JH         Exercise 11    Exercise Name 11  standing AA Sh flex with cane  -JH      Reps 11  10  -JH         Exercise 12    Exercise Name 12  standing AA abd with cane  -JH      Reps 12  10  -JH        User Key  (r) = Recorded By, (t) = Taken By, (c) = Cosigned By    Initials Name Provider Type    Melody Arrieta, PT Physical Therapist                      Manual Rx (last 36 hours)      Manual Treatments     Row Name 10/30/20 0700             Total Minutes    89414 - PT Manual Therapy Minutes  20  -JH         Manual Rx 1    Manual Rx 1 Location  L shoulder  -JH      Manual Rx 1 Type  PROM, oscillations to G-H jt  -JH      Manual Rx 1 Grade  manual rythmic stab  -JH      Manual Rx 1 Duration  20  -JH        User Key  (r) =  Recorded By, (t) = Taken By, (c) = Cosigned By    Initials Name Provider Type    Melody Arrieta PT Physical Therapist          PT OP Goals     Row Name 10/30/20 0700          PT Short Term Goals    STG Date to Achieve  10/28/20  -     STG 1  Pt will be independent with initial HEP for symptom management.  -     STG 1 Progress  Met  -     STG 2  Pt will be compliant with precautions including sling usage and ROM restrictions in order to reduce potential of dislocation.   -     STG 2 Progress  Met  -        Long Term Goals    LTG Date to Achieve  11/28/20  -     LTG 1  Pt will be independent and compliant with advanced HEP for long term management of symptoms and prevention of future occurrence.   -     LTG 1 Progress  Progressing  -     LTG 2  Pt will reduce level of perceived disability as measured by the quick DASH  to 15% in order to improve QOL.  -     LTG 2 Progress  Progressing  -     LTG 3  Pt will demonstrate L shoulder ROM to flex=140, abd=135 and LUCIAN=T1 in order to improve ability to perform ADLs.   -     LTG 3 Progress  Progressing  -     LTG 4  Pt will demonstrate L shoulder strength to 4/5 all planes in order to return to normalized gym activities.   -     LTG 4 Progress  Ongoing  -       User Key  (r) = Recorded By, (t) = Taken By, (c) = Cosigned By    Initials Name Provider Type    Melody Arrieta PT Physical Therapist          Therapy Education  Given: HEP, Symptoms/condition management  Program: Reinforced  How Provided: Verbal, Demonstration  Provided to: Patient  Level of Understanding: Teach back education performed              Time Calculation:   Start Time: 0700  Stop Time: 0745  Time Calculation (min): 45 min  Therapy Charges for Today     Code Description Service Date Service Provider Modifiers Qty    17553619827 HC PT THER PROC EA 15 MIN 10/30/2020 Melody Waddell PT GP 2    22617870684 HC PT MANUAL THERAPY EA 15 MIN 10/30/2020 Melody Waddell PT GP  1                    Melody Waddell, PT  10/30/2020

## 2020-11-04 ENCOUNTER — APPOINTMENT (OUTPATIENT)
Dept: PHYSICAL THERAPY | Facility: HOSPITAL | Age: 85
End: 2020-11-04

## 2020-11-06 ENCOUNTER — HOSPITAL ENCOUNTER (OUTPATIENT)
Dept: PHYSICAL THERAPY | Facility: HOSPITAL | Age: 85
Setting detail: THERAPIES SERIES
Discharge: HOME | End: 2020-11-06

## 2020-11-06 DIAGNOSIS — M25.512 ACUTE PAIN OF LEFT SHOULDER: Primary | ICD-10-CM

## 2020-11-06 DIAGNOSIS — Z47.89 ORTHOPEDIC AFTERCARE: ICD-10-CM

## 2020-11-06 DIAGNOSIS — M25.611 DECREASED RIGHT SHOULDER RANGE OF MOTION: ICD-10-CM

## 2020-11-06 PROCEDURE — 97140 MANUAL THERAPY 1/> REGIONS: CPT

## 2020-11-06 PROCEDURE — 97110 THERAPEUTIC EXERCISES: CPT

## 2020-11-06 NOTE — THERAPY TREATMENT NOTE
Outpatient Physical Therapy Ortho Treatment Note  [facility]     Patient Name: Josue Rothman  : 1935  MRN: [id number]  Today's Date: 2020      Visit Date: 2020    Visit Dx:    ICD-10-CM ICD-9-CM   1. Acute pain of left shoulder  M25.512 719.41   2. Decreased right shoulder range of motion  M25.611 719.51   3. Orthopedic aftercare  Z47.89 V54.9       Patient Active Problem List   Diagnosis   • Bilateral shoulder pain   • Shoulder arthritis        Past Medical History:   Diagnosis Date   • Afib (CMS/[facility]) 2018   • Arthritis     OSTEO   • Elevated cholesterol    • Hyperlipidemia    • Left shoulder pain    • Limited range of motion (ROM) of shoulder    • Renal disorder         Past Surgical History:   Procedure Laterality Date   • BACK SURGERY  2009    RODS & SCREWS   • HERNIA REPAIR     • KIDNEY STONE SURGERY     • TOTAL SHOULDER ARTHROPLASTY W/ DISTAL CLAVICLE EXCISION Left 9/10/2020    Procedure: TOTAL SHOULDER REVERSE ARTHROPLASTY;  Surgeon: Jayden Holbrook MD;  Location: [facility];  Service: Orthopedics;  Laterality: Left;       PT Ortho     Row Name 20 1100       Left Upper Ext    Lt Shoulder External Rotation PROM  AAROM 66  -      User Key  (r) = Recorded By, (t) = Taken By, (c) = Cosigned By    Initials Name Provider Type    Ashley Lemos, PT Physical Therapist                      PT Assessment/Plan     Row Name 20 1304          PT Assessment    Assessment Comments  Pt. tolerating therapy well with minimal pain. Pt. requires cues with pulleys and standing AAROM flexion and abduction to prevent UT compensation. Pt. reports plan to meet with MD  in one month to discuss having R shoulder done if L continues to improve.  -        PT Plan    PT Plan Comments  continue per protocol  -       User Key  (r) = Recorded By, (t) = Taken By, (c) = Cosigned By    Initials Name Provider Type    Ashley Lemos, ALLYN Physical Therapist            OP  Exercises     Row Name 11/06/20 1100             Subjective Comments    Subjective Comments  Just came from the gym   -MH         Subjective Pain    Able to rate subjective pain?  yes  -MH      Pre-Treatment Pain Level  1  -MH         Total Minutes    08887 - PT Therapeutic Exercise Minutes  20  -MH      26133 - PT Manual Therapy Minutes  18  -MH         Exercise 1    Exercise Name 1  Supine AAROM flex  with cane  -MH      Cueing 1  Verbal;Demo  -MH      Reps 1  7  -MH      Time 1  5  -MH         Exercise 2    Exercise Name 2  Supine ER with dowel  -MH      Cueing 2  Verbal;Tactile;Demo  -MH      Reps 2  7  -MH      Time 2  5s  -MH         Exercise 3    Exercise Name 3  Scap squeeze  -MH      Cueing 3  Verbal;Demo  -MH      Reps 3  10  -MH      Time 3  5  -MH         Exercise 4    Exercise Name 4  Post shoulder rolls  -MH      Cueing 4  Verbal;Demo  -MH      Reps 4  10  -MH      Time 4  5  -MH         Exercise 6    Exercise Name 6  Banister slides for flexion  -MH      Sets 6  1  -MH      Reps 6  15  -MH      Time 6  10  -MH         Exercise 7    Exercise Name 7  supine bilat press up with cane  -MH      Sets 7  1  -MH      Reps 7  15  -MH         Exercise 8    Exercise Name 8  Sh pulley for flexion as tolerated and scaption to 90 only  -MH      Time 8  4 min  -MH         Exercise 9    Exercise Name 9  submax isometric deltoid and IR and ER  -MH      Sets 9  1  -MH      Reps 9  10ea  -MH         Exercise 10    Exercise Name 10  UBE (AA)  -MH      Time 10  4 min  -MH         Exercise 11    Exercise Name 11  standing AA Sh flex with cane  -MH      Reps 11  10  -MH         Exercise 12    Exercise Name 12  standing AA abd with cane  -MH      Reps 12  10  -MH         Exercise 13    Exercise Name 13  supine ABC  -MH      Cueing 13  Verbal  -MH      Sets 13  1 set  -MH        User Key  (r) = Recorded By, (t) = Taken By, (c) = Cosigned By    Initials Name Provider Type    Ashley Lemos PT Physical Therapist                       Manual Rx (last 36 hours)      Manual Treatments     Row Name 11/06/20 1100             Total Minutes    83270 - PT Manual Therapy Minutes  18  -MH         Manual Rx 1    Manual Rx 1 Location  L shoulder  -      Manual Rx 1 Type  PROM, oscillations to G-H jt  -      Manual Rx 1 Grade  manual rythmic stab  -      Manual Rx 1 Duration  18  -MH        User Key  (r) = Recorded By, (t) = Taken By, (c) = Cosigned By    Initials Name Provider Type     Ashley Rowe, PT Physical Therapist          PT OP Goals     Row Name 11/06/20 1100          PT Short Term Goals    STG Date to Achieve  10/28/20  -     STG 1  Pt will be independent with initial HEP for symptom management.  -     STG 1 Progress  Met  -     STG 2  Pt will be compliant with precautions including sling usage and ROM restrictions in order to reduce potential of dislocation.   -     STG 2 Progress  Met  -        Long Term Goals    LTG Date to Achieve  11/28/20  -     LTG 1  Pt will be independent and compliant with advanced HEP for long term management of symptoms and prevention of future occurrence.   -     LTG 1 Progress  Progressing  -     LTG 2  Pt will reduce level of perceived disability as measured by the quick DASH  to 15% in order to improve QOL.  -     LTG 2 Progress  Progressing  -     LTG 3  Pt will demonstrate L shoulder ROM to flex=140, abd=135 and LUCIAN=T1 in order to improve ability to perform ADLs.   -     LTG 3 Progress  Progressing  -     LTG 4  Pt will demonstrate L shoulder strength to 4/5 all planes in order to return to normalized gym activities.   -     LTG 4 Progress  Ongoing  -       User Key  (r) = Recorded By, (t) = Taken By, (c) = Cosigned By    Initials Name Provider Type    Ashley Lemos, PT Physical Therapist          Therapy Education  Given: Symptoms/condition management, Pain management, Posture/body mechanics  How Provided: Verbal, Demonstration  Provided to:  Patient  Level of Understanding: Teach back education performed, Demonstrated, Verbalized              Time Calculation:   Start Time: 1115  Stop Time: 1153  Time Calculation (min): 38 min  Therapy Charges for Today     Code Description Service Date Service Provider Modifiers Qty    71418216118 HC PT MANUAL THERAPY EA 15 MIN 11/6/2020 Ashley Rowe, PT GP 1    30390749929 HC PT THER PROC EA 15 MIN 11/6/2020 Ashley Rowe, PT GP 2                    Ashley Rowe, PT  11/6/2020

## 2020-11-11 ENCOUNTER — HOSPITAL ENCOUNTER (OUTPATIENT)
Dept: PHYSICAL THERAPY | Facility: HOSPITAL | Age: 85
Setting detail: THERAPIES SERIES
Discharge: HOME | End: 2020-11-11

## 2020-11-11 DIAGNOSIS — Z47.89 ORTHOPEDIC AFTERCARE: ICD-10-CM

## 2020-11-11 DIAGNOSIS — M25.512 ACUTE PAIN OF LEFT SHOULDER: Primary | ICD-10-CM

## 2020-11-11 DIAGNOSIS — M25.611 DECREASED RIGHT SHOULDER RANGE OF MOTION: ICD-10-CM

## 2020-11-11 PROCEDURE — 97140 MANUAL THERAPY 1/> REGIONS: CPT | Performed by: PHYSICAL THERAPIST

## 2020-11-11 PROCEDURE — 97110 THERAPEUTIC EXERCISES: CPT | Performed by: PHYSICAL THERAPIST

## 2020-11-11 NOTE — THERAPY TREATMENT NOTE
Outpatient Physical Therapy Ortho Treatment Note  [facility]     Patient Name: Josue Rothman  : 1935  MRN: [id number]  Today's Date: 2020      Visit Date: 2020    Visit Dx:    ICD-10-CM ICD-9-CM   1. Acute pain of left shoulder  M25.512 719.41   2. Decreased right shoulder range of motion  M25.611 719.51   3. Orthopedic aftercare  Z47.89 V54.9       Patient Active Problem List   Diagnosis   • Bilateral shoulder pain   • Shoulder arthritis        Past Medical History:   Diagnosis Date   • Afib (CMS/[facility]) 2018   • Arthritis     OSTEO   • Elevated cholesterol    • Hyperlipidemia    • Left shoulder pain    • Limited range of motion (ROM) of shoulder    • Renal disorder         Past Surgical History:   Procedure Laterality Date   • BACK SURGERY  2009    RODS & SCREWS   • HERNIA REPAIR     • KIDNEY STONE SURGERY     • TOTAL SHOULDER ARTHROPLASTY W/ DISTAL CLAVICLE EXCISION Left 9/10/2020    Procedure: TOTAL SHOULDER REVERSE ARTHROPLASTY;  Surgeon: Jayden Holbrook MD;  Location: [facility];  Service: Orthopedics;  Laterality: Left;       PT Ortho     Row Name 20 1000       Subjective Comments    Subjective Comments  Reports coming from the gym. Minimal pain, more stiffness in L shoulder.  Notes that R shoulder is now giving him more pain at this time.  -JS       Subjective Pain    Able to rate subjective pain?  yes  -JS    Pre-Treatment Pain Level  1  -JS       Left Upper Ext    Lt Shoulder Abduction PROM  116  -JS    Lt Shoulder Flexion PROM  140   -JS    Lt Shoulder External Rotation PROM  68 PROM in scapular plane   -JS    Lt Shoulder Internal Rotation PROM  43 in scapular plane  -JS      User Key  (r) = Recorded By, (t) = Taken By, (c) = Cosigned By    Initials Name Provider Type    Yanelis Bates, PT Physical Therapist                      PT Assessment/Plan     Row Name 20 1000          PT Assessment    Assessment Comments  Pt is 8 weeks, 6 days post L reverse  TSA.  Progressing per protocol, demonstrating progress in PROM though tightness remains endrange. Pt with mild shoulder elevation compensatory movement as he approaches higher ranges during standing AAROM.  -JS        PT Plan    PT Plan Comments  Continue per post-operative protocol.  -JS       User Key  (r) = Recorded By, (t) = Taken By, (c) = Cosigned By    Initials Name Provider Type    JS Yanelis Tan, PT Physical Therapist            OP Exercises     Row Name 11/11/20 1000             Subjective Comments    Subjective Comments  Reports coming from the gym. Minimal pain, more stiffness in L shoulder.  Notes that R shoulder is now giving him more pain at this time.  -JS         Subjective Pain    Able to rate subjective pain?  yes  -JS      Pre-Treatment Pain Level  1  -JS         Total Minutes    04275 - PT Therapeutic Exercise Minutes  30  -JS      53137 - PT Manual Therapy Minutes  15  -JS         Exercise 1    Exercise Name 1  Supine AAROM flex  with cane  -JS      Cueing 1  Verbal;Demo  -JS      Reps 1  10  -JS      Time 1  5s  -JS         Exercise 2    Exercise Name 2  Supine ER with dowel  -JS      Cueing 2  Verbal;Tactile;Demo  -JS      Reps 2  10  -JS      Time 2  5s  -JS         Exercise 3    Exercise Name 3  Scap squeeze  -JS      Cueing 3  Verbal;Demo  -JS      Reps 3  10  -JS      Time 3  5  -JS         Exercise 4    Exercise Name 4  Post shoulder rolls  -JS      Cueing 4  Verbal;Demo  -JS      Reps 4  10  -JS      Time 4  5  -JS         Exercise 6    Exercise Name 6  Banister slides for flexion  -JS      Sets 6  1  -JS      Reps 6  15  -JS      Time 6  10  -JS         Exercise 7    Exercise Name 7  supine bilat press up with cane  -JS      Sets 7  1  -JS      Reps 7  15  -JS         Exercise 8    Exercise Name 8  Sh pulley for flexion & scaption as tolerated  -JS      Time 8  4 min  -JS         Exercise 9    Exercise Name 9  submax isometric deltoid and IR and ER  -JS      Sets 9  1  -JS      Reps 9   10ea  -JS         Exercise 10    Exercise Name 10  UBE (AA)  -JS      Time 10  4 min  -JS         Exercise 11    Exercise Name 11  standing AA Sh flex with cane  -JS      Reps 11  10  -JS         Exercise 12    Exercise Name 12  standing AA abd with cane  -JS      Reps 12  10  -JS         Exercise 13    Exercise Name 13  supine ABC  -JS      Cueing 13  Verbal  -JS      Sets 13  1 set  -JS        User Key  (r) = Recorded By, (t) = Taken By, (c) = Cosigned By    Initials Name Provider Type    JS Yanelis Tan, PT Physical Therapist                      Manual Rx (last 36 hours)      Manual Treatments     Row Name 11/11/20 1000             Total Minutes    74788 - PT Manual Therapy Minutes  15  -JS         Manual Rx 1    Manual Rx 1 Location  L shoulder  -JS      Manual Rx 1 Type  PROM, oscillations to G-H jt  -JS      Manual Rx 1 Grade  (IR PROM in scapular plane <45 degrees per protocol)  -JS      Manual Rx 1 Duration  15  -JS        User Key  (r) = Recorded By, (t) = Taken By, (c) = Cosigned By    Initials Name Provider Type    Yanelis Bates, PT Physical Therapist          PT OP Goals     Row Name 11/11/20 1000          PT Short Term Goals    STG Date to Achieve  10/28/20  -JS     STG 1  Pt will be independent with initial HEP for symptom management.  -JS     STG 1 Progress  Met  -JS     STG 2  Pt will be compliant with precautions including sling usage and ROM restrictions in order to reduce potential of dislocation.   -JS     STG 2 Progress  Met  -JS        Long Term Goals    LTG Date to Achieve  11/28/20  -JS     LTG 1  Pt will be independent and compliant with advanced HEP for long term management of symptoms and prevention of future occurrence.   -JS     LTG 1 Progress  Progressing  -JS     LTG 2  Pt will reduce level of perceived disability as measured by the quick DASH  to 15% in order to improve QOL.  -JS     LTG 2 Progress  Progressing  -JS     LTG 3  Pt will demonstrate L shoulder ROM to flex=140, abd=135  and LUCIAN=T1 in order to improve ability to perform ADLs.   -JS     LTG 3 Progress  Progressing  -JS     LTG 4  Pt will demonstrate L shoulder strength to 4/5 all planes in order to return to normalized gym activities.   -JS     LTG 4 Progress  Ongoing  -JS       User Key  (r) = Recorded By, (t) = Taken By, (c) = Cosigned By    Initials Name Provider Type    Yanelis Bates, PT Physical Therapist          Therapy Education  Education Details: Reviewed HEP & post-operative precautions  Given: HEP  Program: Reinforced  How Provided: Verbal, Demonstration  Provided to: Patient  Level of Understanding: Teach back education performed, Verbalized, Demonstrated              Time Calculation:   Start Time: 1000  Stop Time: 1045  Time Calculation (min): 45 min  Therapy Charges for Today     Code Description Service Date Service Provider Modifiers Qty    36708881310  PT THER PROC EA 15 MIN 11/11/2020 Yanelis Tan, PT GP 2    [id number]  PT MANUAL THERAPY EA 15 MIN 11/11/2020 Yanelis Tan, PT GP 1                    Yanelis Tan PT  11/11/2020

## 2020-11-13 ENCOUNTER — HOSPITAL ENCOUNTER (OUTPATIENT)
Dept: PHYSICAL THERAPY | Facility: HOSPITAL | Age: 85
Setting detail: THERAPIES SERIES
Discharge: HOME | End: 2020-11-13

## 2020-11-13 DIAGNOSIS — Z47.89 ORTHOPEDIC AFTERCARE: ICD-10-CM

## 2020-11-13 DIAGNOSIS — M25.512 ACUTE PAIN OF LEFT SHOULDER: Primary | ICD-10-CM

## 2020-11-13 DIAGNOSIS — M25.611 DECREASED RIGHT SHOULDER RANGE OF MOTION: ICD-10-CM

## 2020-11-13 PROCEDURE — 97110 THERAPEUTIC EXERCISES: CPT

## 2020-11-13 PROCEDURE — 97140 MANUAL THERAPY 1/> REGIONS: CPT

## 2020-11-13 NOTE — THERAPY TREATMENT NOTE
"    Outpatient Physical Therapy Ortho Treatment Note  [facility]     Patient Name: Josue Rothman  : 1935  MRN: [id number]  Today's Date: 2020      Visit Date: 2020    Visit Dx:    ICD-10-CM ICD-9-CM   1. Acute pain of left shoulder  M25.512 719.41   2. Decreased right shoulder range of motion  M25.611 719.51   3. Orthopedic aftercare  Z47.89 V54.9       Patient Active Problem List   Diagnosis   • Bilateral shoulder pain   • Shoulder arthritis        Past Medical History:   Diagnosis Date   • Afib (CMS/[facility]) 2018   • Arthritis     OSTEO   • Elevated cholesterol    • Hyperlipidemia    • Left shoulder pain    • Limited range of motion (ROM) of shoulder    • Renal disorder         Past Surgical History:   Procedure Laterality Date   • BACK SURGERY  2009    RODS & SCREWS   • HERNIA REPAIR     • KIDNEY STONE SURGERY     • TOTAL SHOULDER ARTHROPLASTY W/ DISTAL CLAVICLE EXCISION Left 9/10/2020    Procedure: TOTAL SHOULDER REVERSE ARTHROPLASTY;  Surgeon: Jayden Holbrook MD;  Location: [facility];  Service: Orthopedics;  Laterality: Left;       PT Ortho     Row Name 20 0800       Left Upper Ext    Lt Shoulder Flexion PROM  143  -MH    Lt Shoulder External Rotation PROM  72 in scapular plane  -      User Key  (r) = Recorded By, (t) = Taken By, (c) = Cosigned By    Initials Name Provider Type     Ashley Rowe, PT Physical Therapist                      PT Assessment/Plan     Row Name 20 0898          PT Assessment    Assessment Comments  Pt. is now 9 weeks s/p L rTSA and continues to progress well. Pt. with reports of minimal pain and PROM increasing slowly (see ortho). Mild \"tenderness\" per pt. at end of available range into abduction. Initiated S/L flexion and ER with tactile cues at scapula for stability. Continued cues with ther ex in sitting and standing to avoid shoulder elevation.  -        PT Plan    PT Plan Comments  Progress per protocol  -       User Key "  (r) = Recorded By, (t) = Taken By, (c) = Cosigned By    Initials Name Provider Type     Ashley Rowe, PT Physical Therapist          Modalities     Row Name 11/13/20 0700             Ice    Ice Applied  Yes  -MH      Location  L shoulder  -MH      Rx Minutes  10 mins  -MH      Ice S/P Rx  Yes  -MH        User Key  (r) = Recorded By, (t) = Taken By, (c) = Cosigned By    Initials Name Provider Type     Ashley Rowe, PT Physical Therapist        OP Exercises     Row Name 11/13/20 0700             Subjective Comments    Subjective Comments  Doing well; no complaints.   -MH         Subjective Pain    Able to rate subjective pain?  yes  -MH      Pre-Treatment Pain Level  1  -MH         Total Minutes    19574 - PT Therapeutic Exercise Minutes  30  -MH      71318 - PT Manual Therapy Minutes  15  -MH         Exercise 1    Exercise Name 1  Supine AAROM flex  with cane  -MH      Cueing 1  Verbal;Demo  -MH      Reps 1  10  -MH      Time 1  5s  -MH         Exercise 2    Exercise Name 2  Supine ER with dowel  -MH      Cueing 2  Verbal;Tactile;Demo  -MH      Reps 2  10  -MH      Time 2  5s  -MH         Exercise 3    Exercise Name 3  Scap squeeze  -MH      Cueing 3  Verbal;Demo  -MH      Reps 3  10  -MH      Time 3  5  -MH         Exercise 4    Exercise Name 4  Post shoulder rolls  -MH      Cueing 4  Verbal;Demo  -MH      Reps 4  10  -MH      Time 4  5  -MH         Exercise 6    Exercise Name 6  Banister slides for flexion  -MH      Sets 6  1  -MH      Reps 6  15  -MH      Time 6  10  -MH         Exercise 7    Exercise Name 7  supine bilat press up with cane  -MH      Sets 7  1  -MH      Reps 7  15  -MH         Exercise 8    Exercise Name 8  Sh pulley for flexion & scaption as tolerated  -MH      Time 8  4 min  -MH         Exercise 9    Exercise Name 9  --  -MH      Sets 9  --  -MH      Reps 9  --  -MH         Exercise 10    Exercise Name 10  UBE (AA)  -MH      Time 10  4 min  -MH         Exercise 11    Exercise Name 11   standing AA Sh flex with cane  -MH      Reps 11  10  -MH         Exercise 12    Exercise Name 12  standing AA abd with cane  -      Reps 12  10  -MH         Exercise 13    Exercise Name 13  supine ABC  -      Cueing 13  Verbal  -      Sets 13  1 set  -MH         Exercise 14    Exercise Name 14  S/L flex  -MH      Cueing 14  Verbal;Tactile  -MH      Reps 14  8  -MH         Exercise 15    Exercise Name 15  S/L ER  -MH      Cueing 15  Verbal;Tactile  -MH      Reps 15  8  -MH        User Key  (r) = Recorded By, (t) = Taken By, (c) = Cosigned By    Initials Name Provider Type     Ashley Rowe, PT Physical Therapist                      Manual Rx (last 36 hours)      Manual Treatments     Row Name 11/13/20 0700             Total Minutes    87935 - PT Manual Therapy Minutes  15  -         Manual Rx 1    Manual Rx 1 Location  L shoulder  -      Manual Rx 1 Type  PROM, oscillations to G-H jt  -      Manual Rx 1 Grade  (IR PROM in scapular plane <45 degrees per protocol)  -      Manual Rx 1 Duration  15  -MH        User Key  (r) = Recorded By, (t) = Taken By, (c) = Cosigned By    Initials Name Provider Type     Ashley Rowe, PT Physical Therapist          PT OP Goals     Row Name 11/13/20 0700          PT Short Term Goals    STG Date to Achieve  10/28/20  -     STG 1  Pt will be independent with initial HEP for symptom management.  -     STG 1 Progress  Met  -     STG 2  Pt will be compliant with precautions including sling usage and ROM restrictions in order to reduce potential of dislocation.   -     STG 2 Progress  Met  -        Long Term Goals    LTG Date to Achieve  11/28/20  -     LTG 1  Pt will be independent and compliant with advanced HEP for long term management of symptoms and prevention of future occurrence.   -     LTG 1 Progress  Progressing  -     LTG 2  Pt will reduce level of perceived disability as measured by the quick DASH  to 15% in order to improve QOL.  -      LTG 2 Progress  Progressing  -     LTG 3  Pt will demonstrate L shoulder ROM to flex=140, abd=135 and LUCIAN=T1 in order to improve ability to perform ADLs.   -     LTG 3 Progress  Progressing  -     LTG 4  Pt will demonstrate L shoulder strength to 4/5 all planes in order to return to normalized gym activities.   -     LTG 4 Progress  Ongoing  -       User Key  (r) = Recorded By, (t) = Taken By, (c) = Cosigned By    Initials Name Provider Type     Ashley Rowe PT Physical Therapist          Therapy Education  Given: Symptoms/condition management, Posture/body mechanics, Mobility training  Program: Reinforced  How Provided: Verbal, Demonstration  Provided to: Patient  Level of Understanding: Teach back education performed, Verbalized, Demonstrated              Time Calculation:   Start Time: 0745  Stop Time: 0840  Time Calculation (min): 55 min  Total Timed Code Minutes- PT: 45 minute(s)  Therapy Charges for Today     Code Description Service Date Service Provider Modifiers Qty    58860277895  PT MANUAL THERAPY EA 15 MIN 11/13/2020 Ashley Rowe, PT GP 1    [id number]  PT THER PROC EA 15 MIN 11/13/2020 Ashley Rowe, PT GP 2    [id number]  PT HOT OR COLD PACK TREAT MCARE 11/13/2020 Ashley Rowe, PT GP 1                    Ashley Rowe PT  11/13/2020

## 2020-11-18 ENCOUNTER — HOSPITAL ENCOUNTER (OUTPATIENT)
Dept: PHYSICAL THERAPY | Facility: HOSPITAL | Age: 85
Setting detail: THERAPIES SERIES
Discharge: HOME | End: 2020-11-18

## 2020-11-18 DIAGNOSIS — M25.611 DECREASED RIGHT SHOULDER RANGE OF MOTION: ICD-10-CM

## 2020-11-18 DIAGNOSIS — Z47.89 ORTHOPEDIC AFTERCARE: ICD-10-CM

## 2020-11-18 DIAGNOSIS — M25.512 ACUTE PAIN OF LEFT SHOULDER: Primary | ICD-10-CM

## 2020-11-18 PROCEDURE — 97140 MANUAL THERAPY 1/> REGIONS: CPT

## 2020-11-18 PROCEDURE — 97110 THERAPEUTIC EXERCISES: CPT

## 2020-11-18 NOTE — THERAPY TREATMENT NOTE
Outpatient Physical Therapy Ortho Treatment Note  Psychiatric     Patient Name: Josue Rothman  : 1935  MRN: [id number]  Today's Date: 2020      Visit Date: 2020    Visit Dx:    ICD-10-CM ICD-9-CM   1. Acute pain of left shoulder  M25.512 719.41   2. Decreased right shoulder range of motion  M25.611 719.51   3. Orthopedic aftercare  Z47.89 V54.9       Patient Active Problem List   Diagnosis   • Bilateral shoulder pain   • Shoulder arthritis        Past Medical History:   Diagnosis Date   • Afib (CMS/[facility]) 2018   • Arthritis     OSTEO   • Elevated cholesterol    • Hyperlipidemia    • Left shoulder pain    • Limited range of motion (ROM) of shoulder    • Renal disorder         Past Surgical History:   Procedure Laterality Date   • BACK SURGERY  2009    RODS & SCREWS   • HERNIA REPAIR     • KIDNEY STONE SURGERY     • TOTAL SHOULDER ARTHROPLASTY W/ DISTAL CLAVICLE EXCISION Left 9/10/2020    Procedure: TOTAL SHOULDER REVERSE ARTHROPLASTY;  Surgeon: Jayden Holbrook MD;  Location: [facility];  Service: Orthopedics;  Laterality: Left;                       PT Assessment/Plan     Row Name 20 0800          PT Assessment    Assessment Comments  Pt is 10 weeks s/p rev TSA L shoulder, he reports minimal to no pain and continues to report improving mobility. Progressed strengthening this date with resistance during chest press, ABC, ad scap retract. Pt requires cues for technique during s/l ER as well as flex/hxAB/AD however tolerates performance well. He remains appropriate for skilled PT.  -RS        PT Plan    PT Plan Comments  Cont to progress strengthening as tolerance allows, consider shelf reach, update HEP as tolerance allows  -RS       User Key  (r) = Recorded By, (t) = Taken By, (c) = Cosigned By    Initials Name Provider Type    RS Rena Knowles, PT Physical Therapist            OP Exercises     Row Name 20 0700             Subjective Comments    Subjective  Comments  Feeling great, L shoulder feels better than R  -RS         Subjective Pain    Able to rate subjective pain?  yes  -RS      Pre-Treatment Pain Level  1  -RS         Total Minutes    30822 - PT Therapeutic Exercise Minutes  30  -RS      18633 - PT Manual Therapy Minutes  15  -RS         Exercise 1    Exercise Name 1  supine flex with TB  -RS      Cueing 1  Verbal;Demo  -RS      Reps 1  10  -RS      Time 1  --  -RS      Additional Comments  YTB  -RS         Exercise 2    Exercise Name 2  --  -RS      Cueing 2  --  -RS      Reps 2  --  -RS      Time 2  --  -RS         Exercise 3    Exercise Name 3  Scap squeeze  -RS      Cueing 3  Verbal;Demo  -RS      Reps 3  15  -RS      Time 3  --  -RS      Additional Comments  RTB  -RS         Exercise 4    Exercise Name 4  Post shoulder rolls  -RS      Cueing 4  Verbal;Demo  -RS      Reps 4  10  -RS      Time 4  5  -RS         Exercise 6    Exercise Name 6  wall slide  -RS      Sets 6  1  -RS      Reps 6  10  -RS      Time 6  --  -RS         Exercise 7    Exercise Name 7  supine bilat press up with cane  -RS      Sets 7  1  -RS      Reps 7  20  -RS      Additional Comments  3#  -RS         Exercise 8    Exercise Name 8  Sh pulley for flexion & scaption as tolerated  -RS      Time 8  4 min  -RS         Exercise 10    Exercise Name 10  UBE (AA)  -RS      Time 10  4 min  -RS         Exercise 11    Exercise Name 11  --  -RS      Reps 11  --  -RS         Exercise 12    Exercise Name 12  --  -RS      Reps 12  --  -RS         Exercise 13    Exercise Name 13  supine ABC  -RS      Cueing 13  Verbal  -RS      Sets 13  1 set  -RS      Additional Comments  2#  -RS         Exercise 14    Exercise Name 14  S/L flex to hz AB to ad  -RS      Cueing 14  Verbal;Tactile  -RS      Reps 14  10  -RS         Exercise 15    Exercise Name 15  S/L ER  -RS      Cueing 15  Verbal;Tactile  -RS      Sets 15  2  -RS      Reps 15  8  -RS        User Key  (r) = Recorded By, (t) = Taken By, (c) = Cosigned  By    Initials Name Provider Type    RS Rena Knowles, PT Physical Therapist                      Manual Rx (last 36 hours)      Manual Treatments     Row Name 11/18/20 0700             Total Minutes    91628 - PT Manual Therapy Minutes  15  -RS         Manual Rx 1    Manual Rx 1 Location  L shoulder  -RS      Manual Rx 1 Type  PROM, oscillations to G-H jt  -RS      Manual Rx 1 Grade  (IR PROM in scapular plane <45 degrees per protocol)  -RS      Manual Rx 1 Duration  15  -RS        User Key  (r) = Recorded By, (t) = Taken By, (c) = Cosigned By    Initials Name Provider Type    RS Rena Knowles, PT Physical Therapist          PT OP Goals     Row Name 11/18/20 0700          PT Short Term Goals    STG Date to Achieve  10/28/20  -RS     STG 1  Pt will be independent with initial HEP for symptom management.  -RS     STG 1 Progress  Met  -RS     STG 2  Pt will be compliant with precautions including sling usage and ROM restrictions in order to reduce potential of dislocation.   -RS     STG 2 Progress  Met  -RS        Long Term Goals    LTG Date to Achieve  11/28/20  -RS     LTG 1  Pt will be independent and compliant with advanced HEP for long term management of symptoms and prevention of future occurrence.   -RS     LTG 1 Progress  Progressing  -RS     LTG 2  Pt will reduce level of perceived disability as measured by the quick DASH  to 15% in order to improve QOL.  -RS     LTG 2 Progress  Progressing  -RS     LTG 3  Pt will demonstrate L shoulder ROM to flex=140, abd=135 and LUCIAN=T1 in order to improve ability to perform ADLs.   -RS     LTG 3 Progress  Progressing  -RS     LTG 4  Pt will demonstrate L shoulder strength to 4/5 all planes in order to return to normalized gym activities.   -RS     LTG 4 Progress  Ongoing  -RS       User Key  (r) = Recorded By, (t) = Taken By, (c) = Cosigned By    Initials Name Provider Type    RS Rena Knowles, PT Physical Therapist                         Time Calculation:    Start Time: 0745  Stop Time: 0831  Time Calculation (min): 46 min  Therapy Charges for Today     Code Description Service Date Service Provider Modifiers Qty    02226565568  PT THER PROC EA 15 MIN 11/18/2020 Rena Knowles, PT GP 2    [id number]  PT MANUAL THERAPY EA 15 MIN 11/18/2020 Rena Knowles, PT GP 1                    Rena Knowles, ALLYN  11/18/2020

## 2020-11-20 ENCOUNTER — HOSPITAL ENCOUNTER (OUTPATIENT)
Dept: PHYSICAL THERAPY | Facility: HOSPITAL | Age: 85
Setting detail: THERAPIES SERIES
Discharge: HOME | End: 2020-11-20

## 2020-11-20 DIAGNOSIS — M25.512 ACUTE PAIN OF LEFT SHOULDER: Primary | ICD-10-CM

## 2020-11-20 DIAGNOSIS — Z47.89 ORTHOPEDIC AFTERCARE: ICD-10-CM

## 2020-11-20 DIAGNOSIS — M25.611 DECREASED RIGHT SHOULDER RANGE OF MOTION: ICD-10-CM

## 2020-11-20 PROCEDURE — 97110 THERAPEUTIC EXERCISES: CPT

## 2020-11-20 PROCEDURE — 97140 MANUAL THERAPY 1/> REGIONS: CPT

## 2020-11-20 NOTE — THERAPY TREATMENT NOTE
Outpatient Physical Therapy Ortho Treatment Note  [facility]     Patient Name: Josue Rothman  : 1935  MRN: [id number]  Today's Date: 2020      Visit Date: 2020    Visit Dx:    ICD-10-CM ICD-9-CM   1. Acute pain of left shoulder  M25.512 719.41   2. Decreased right shoulder range of motion  M25.611 719.51   3. Orthopedic aftercare  Z47.89 V54.9       Patient Active Problem List   Diagnosis   • Bilateral shoulder pain   • Shoulder arthritis        Past Medical History:   Diagnosis Date   • Afib (CMS/[facility]) 2018   • Arthritis     OSTEO   • Elevated cholesterol    • Hyperlipidemia    • Left shoulder pain    • Limited range of motion (ROM) of shoulder    • Renal disorder         Past Surgical History:   Procedure Laterality Date   • BACK SURGERY  2009    RODS & SCREWS   • HERNIA REPAIR     • KIDNEY STONE SURGERY     • TOTAL SHOULDER ARTHROPLASTY W/ DISTAL CLAVICLE EXCISION Left 9/10/2020    Procedure: TOTAL SHOULDER REVERSE ARTHROPLASTY;  Surgeon: Jayden Holbrook MD;  Location: [facility];  Service: Orthopedics;  Laterality: Left;       PT Ortho     Row Name 20 0800       Left Upper Ext    Lt Shoulder Abduction PROM  104  -MH    Lt Shoulder Flexion PROM  140  -MH      User Key  (r) = Recorded By, (t) = Taken By, (c) = Cosigned By    Initials Name Provider Type    Ashley Lemos, PT Physical Therapist                      PT Assessment/Plan     Row Name 20 0818          PT Assessment    Assessment Comments  Pt. continues to report minimal pain and that L shoulder is now doing better than R; he plans to speak to MD in coming weeks about having rTSA on R. Pt. remains limited in PROM into abduction and remains weak with AROM. Introduced shelf reach and standing flexion to 90 to improve functional strength. Cues required to avoid shoulder elevation compensation in standing.  -        PT Plan    PT Plan Comments  Continue to progress functional strengthening;  consider D2 flexion  -       User Key  (r) = Recorded By, (t) = Taken By, (c) = Cosigned By    Initials Name Provider Type     Ashley Rowe, PT Physical Therapist          Modalities     Row Name 11/20/20 0700             Ice    Ice Applied  Yes  -      Location  L shoulder  -      Rx Minutes  10 mins  -      Ice S/P Rx  Yes  -MH        User Key  (r) = Recorded By, (t) = Taken By, (c) = Cosigned By    Initials Name Provider Type     Ashley Rowe, PT Physical Therapist        OP Exercises     Row Name 11/20/20 0700             Subjective Comments    Subjective Comments  Feels good; I can do everything I want to do  -         Subjective Pain    Able to rate subjective pain?  yes  -      Pre-Treatment Pain Level  1  -         Total Minutes    87566 - PT Therapeutic Exercise Minutes  35  -MH      30442 - PT Manual Therapy Minutes  10  -MH         Exercise 1    Exercise Name 1  supine flex with TB  -MH      Cueing 1  Verbal;Demo  -      Reps 1  10  -MH      Additional Comments  YTB  -         Exercise 3    Exercise Name 3  Scap squeeze  -      Cueing 3  Verbal;Demo  -      Reps 3  15  -MH         Exercise 4    Exercise Name 4  Post shoulder rolls  -      Cueing 4  Verbal;Demo  -      Reps 4  10  -MH      Time 4  5  -MH         Exercise 6    Exercise Name 6  wall slide  -      Sets 6  1  -MH      Reps 6  10  -MH         Exercise 7    Exercise Name 7  supine bilat press up with cane  -      Sets 7  1  -MH      Reps 7  20  -MH      Additional Comments  3#  -MH         Exercise 8    Exercise Name 8  Sh pulley for flexion & scaption as tolerated  -      Time 8  4 min  -         Exercise 10    Exercise Name 10  UBE (AA)  -      Time 10  4 min  -         Exercise 13    Exercise Name 13  supine ABC  -MH      Cueing 13  Verbal  -      Sets 13  1 set  -      Additional Comments  2#  -         Exercise 14    Exercise Name 14  S/L flex to hz AB to ad  -MH      Cueing 14   Verbal;Tactile  -MH      Reps 14  10  -MH         Exercise 15    Exercise Name 15  S/L ER  -MH      Cueing 15  Verbal;Tactile  -MH      Sets 15  2  -MH      Reps 15  8  -MH      Additional Comments  1#  -         Exercise 16    Exercise Name 16  shelf reach  -MH      Cueing 16  Verbal;Tactile  -MH      Reps 16  10  -MH      Additional Comments  1#  -MH         Exercise 17    Exercise Name 17  standing flex to 90  -MH      Cueing 17  Verbal  -MH      Reps 17  10  -MH      Additional Comments  avoid [place] compensation  -        User Key  (r) = Recorded By, (t) = Taken By, (c) = Cosigned By    Initials Name Provider Type     HammerAshley, PT Physical Therapist                      Manual Rx (last 36 hours)      Manual Treatments     Row Name 11/20/20 0700             Total Minutes    34891 - PT Manual Therapy Minutes  10  -         Manual Rx 1    Manual Rx 1 Location  L shoulder  -      Manual Rx 1 Type  PROM, oscillations to G-H jt  -      Manual Rx 1 Grade  (IR PROM in scapular plane <45 degrees per protocol)  -      Manual Rx 1 Duration  10  -        User Key  (r) = Recorded By, (t) = Taken By, (c) = Cosigned By    Initials Name Provider Type     HammerAshley, PT Physical Therapist          PT OP Goals     Row Name 11/20/20 0700          PT Short Term Goals    STG Date to Achieve  10/28/20  -     STG 1  Pt will be independent with initial HEP for symptom management.  -     STG 1 Progress  Met  -     STG 2  Pt will be compliant with precautions including sling usage and ROM restrictions in order to reduce potential of dislocation.   -     STG 2 Progress  Met  -        Long Term Goals    LTG Date to Achieve  11/28/20  -     LTG 1  Pt will be independent and compliant with advanced HEP for long term management of symptoms and prevention of future occurrence.   -     LTG 1 Progress  Progressing  -     LTG 2  Pt will reduce level of perceived disability as measured by the quick DASH   to 15% in order to improve QOL.  -     LTG 2 Progress  Progressing  -     LTG 3  Pt will demonstrate L shoulder ROM to flex=140, abd=135 and LUCIAN=T1 in order to improve ability to perform ADLs.   -     LTG 3 Progress  Progressing  -     LTG 4  Pt will demonstrate L shoulder strength to 4/5 all planes in order to return to normalized gym activities.   -     LTG 4 Progress  Ongoing  -       User Key  (r) = Recorded By, (t) = Taken By, (c) = Cosigned By    Initials Name Provider Type     Ashley Rowe PT Physical Therapist          Therapy Education  Given: Symptoms/condition management, Posture/body mechanics, Mobility training  Program: Reinforced  How Provided: Verbal, Demonstration  Provided to: Patient  Level of Understanding: Teach back education performed, Verbalized, Demonstrated              Time Calculation:   Start Time: 0745  Stop Time: 0840  Time Calculation (min): 55 min  Total Timed Code Minutes- PT: 45 minute(s)  Therapy Charges for Today     Code Description Service Date Service Provider Modifiers Qty    25274826187 HC PT MANUAL THERAPY EA 15 MIN 11/20/2020 Ashley Rowe, PT GP 1    66184795729 HC PT THER PROC EA 15 MIN 11/20/2020 Ashley Rowe, PT GP 2    [id number]  PT HOT OR COLD PACK TREAT MCARE 11/20/2020 Ashley Rowe, PT GP 1                    Ashley Rowe PT  11/20/2020

## 2020-11-25 ENCOUNTER — HOSPITAL ENCOUNTER (OUTPATIENT)
Dept: PHYSICAL THERAPY | Facility: HOSPITAL | Age: 85
Setting detail: THERAPIES SERIES
Discharge: HOME | End: 2020-11-25

## 2020-11-25 DIAGNOSIS — Z47.89 ORTHOPEDIC AFTERCARE: ICD-10-CM

## 2020-11-25 DIAGNOSIS — M25.611 DECREASED RIGHT SHOULDER RANGE OF MOTION: ICD-10-CM

## 2020-11-25 DIAGNOSIS — M25.512 ACUTE PAIN OF LEFT SHOULDER: Primary | ICD-10-CM

## 2020-11-25 PROCEDURE — 97140 MANUAL THERAPY 1/> REGIONS: CPT

## 2020-11-25 PROCEDURE — 97110 THERAPEUTIC EXERCISES: CPT

## 2020-11-25 NOTE — THERAPY TREATMENT NOTE
Outpatient Physical Therapy Ortho Treatment Note  [facility]     Patient Name: Josue Rothman  : 1935  MRN: [id number]  Today's Date: 2020      Visit Date: 2020    Visit Dx:    ICD-10-CM ICD-9-CM   1. Acute pain of left shoulder  M25.512 719.41   2. Decreased right shoulder range of motion  M25.611 719.51   3. Orthopedic aftercare  Z47.89 V54.9       Patient Active Problem List   Diagnosis   • Bilateral shoulder pain   • Shoulder arthritis        Past Medical History:   Diagnosis Date   • Afib (CMS/[facility]) 2018   • Arthritis     OSTEO   • Elevated cholesterol    • Hyperlipidemia    • Left shoulder pain    • Limited range of motion (ROM) of shoulder    • Renal disorder         Past Surgical History:   Procedure Laterality Date   • BACK SURGERY  2009    RODS & SCREWS   • HERNIA REPAIR     • KIDNEY STONE SURGERY     • TOTAL SHOULDER ARTHROPLASTY W/ DISTAL CLAVICLE EXCISION Left 9/10/2020    Procedure: TOTAL SHOULDER REVERSE ARTHROPLASTY;  Surgeon: Jayden Holbrook MD;  Location: [facility];  Service: Orthopedics;  Laterality: Left;                       PT Assessment/Plan     Row Name 20 0821          PT Assessment    Assessment Comments  Pt with minimal limitations with ADLs at this time and mild ROM deficits into abduction. Pt continues with strength impairments limiting OH reaching, however reporting most pain with R shoulder at this time. Pt planning to return to MD next Friday for evaluation of R shoulder and possibly to schedule R shoulder surgery.  -CN        PT Plan    PT Plan Comments  Plan to follow up in 2 weeks and likely D/C to independent management pending symptoms at that time.  -CN       User Key  (r) = Recorded By, (t) = Taken By, (c) = Cosigned By    Initials Name Provider Type    Sondra Joe, PT Physical Therapist          Modalities     Row Name 20 0800             Ice    Ice Applied  Yes  -CN      Location  L shoulder  -CN      Rx  Minutes  10 mins  -CN      Ice S/P Rx  Yes  -CN        User Key  (r) = Recorded By, (t) = Taken By, (c) = Cosigned By    Initials Name Provider Type    Sondra Joe, PT Physical Therapist        OP Exercises     Row Name 11/25/20 0800 11/25/20 0700          Subjective Comments    Subjective Comments  I am feeling good, I can't reach behind my back but the doc said I won't be able to do that.   -CN  --        Subjective Pain    Able to rate subjective pain?  yes  -CN  --     Pre-Treatment Pain Level  1  -CN  --        Total Minutes    19729 - PT Therapeutic Exercise Minutes  35  -CN  --     44618 - PT Manual Therapy Minutes  --  10  -CN        Exercise 1    Exercise Name 1  supine flex with TB  -CN  --     Cueing 1  Verbal;Demo  -CN  --     Reps 1  10  -CN  --     Additional Comments  YTB  -CN  --        Exercise 3    Exercise Name 3  Scap squeeze  -CN  --     Cueing 3  Verbal;Demo  -CN  --     Reps 3  15  -CN  --        Exercise 4    Exercise Name 4  Post shoulder rolls  -CN  --     Cueing 4  Verbal;Demo  -CN  --     Reps 4  10  -CN  --     Time 4  5  -CN  --        Exercise 7    Exercise Name 7  supine bilat press up with cane  -CN  --     Sets 7  1  -CN  --     Reps 7  20  -CN  --     Additional Comments  3#  -CN  --        Exercise 8    Exercise Name 8  Sh pulley for flexion & scaption as tolerated  -CN  --     Time 8  4 min  -CN  --        Exercise 10    Exercise Name 10  UBE (AA)  -CN  --     Time 10  4 min  -CN  --        Exercise 13    Exercise Name 13  supine ABC  -CN  --     Cueing 13  Verbal  -CN  --     Sets 13  1 set  -CN  --     Additional Comments  2#  -CN  --        Exercise 14    Exercise Name 14  S/L flex to hz AB to ad  -CN  --     Cueing 14  Verbal;Tactile  -CN  --     Reps 14  10  -CN  --        Exercise 15    Exercise Name 15  S/L ER  -CN  --     Cueing 15  Verbal;Tactile  -CN  --     Sets 15  2  -CN  --     Reps 15  8  -CN  --     Additional Comments  1#  -CN  --        Exercise  16    Exercise Name 16  shelf reach  -CN  --     Cueing 16  Verbal;Tactile  -CN  --     Reps 16  10  -CN  --     Additional Comments  1#  -CN  --       User Key  (r) = Recorded By, (t) = Taken By, (c) = Cosigned By    Initials Name Provider Type    Sondra Joe, PT Physical Therapist                      Manual Rx (last 36 hours)      Manual Treatments     Row Name 11/25/20 0700             Total Minutes    15853 - PT Manual Therapy Minutes  10  -CN         Manual Rx 1    Manual Rx 1 Location  L shoulder  -CN      Manual Rx 1 Type  PROM, oscillations to G-H jt  -CN      Manual Rx 1 Grade  (IR PROM in scapular plane <45 degrees per protocol)  -CN      Manual Rx 1 Duration  10  -CN        User Key  (r) = Recorded By, (t) = Taken By, (c) = Cosigned By    Initials Name Provider Type    Sondra Joe, PT Physical Therapist          PT OP Goals     Row Name 11/25/20 0800          PT Short Term Goals    STG Date to Achieve  10/28/20  -CN     STG 1  Pt will be independent with initial HEP for symptom management.  -CN     STG 1 Progress  Met  -CN     STG 2  Pt will be compliant with precautions including sling usage and ROM restrictions in order to reduce potential of dislocation.   -CN     STG 2 Progress  Met  -CN        Long Term Goals    LTG Date to Achieve  11/28/20  -CN     LTG 1  Pt will be independent and compliant with advanced HEP for long term management of symptoms and prevention of future occurrence.   -CN     LTG 1 Progress  Progressing  -CN     LTG 2  Pt will reduce level of perceived disability as measured by the quick DASH  to 15% in order to improve QOL.  -CN     LTG 2 Progress  Progressing  -CN     LTG 3  Pt will demonstrate L shoulder ROM to flex=140, abd=135 and LUCIAN=T1 in order to improve ability to perform ADLs.   -CN     LTG 3 Progress  Progressing  -CN     LTG 3 Progress Comments  Pt continues with limitations into abduction ROM.   -CN     LTG 4  Pt will demonstrate L  shoulder strength to 4/5 all planes in order to return to normalized gym activities.   -CALI     LTG 4 Progress  Ongoing  -CALI       User Key  (r) = Recorded By, (t) = Taken By, (c) = Cosigned By    Initials Name Provider Type    Sondra Joe, PT Physical Therapist          Therapy Education  Given: Symptoms/condition management, Posture/body mechanics, Mobility training  Program: Reinforced  How Provided: Verbal, Demonstration  Provided to: Patient  Level of Understanding: Teach back education performed, Verbalized, Demonstrated              Time Calculation:   Start Time: 0745  Stop Time: 0840  Time Calculation (min): 55 min  Therapy Charges for Today     Code Description Service Date Service Provider Modifiers Qty    03649750053 HC PT THER PROC EA 15 MIN 11/25/2020 Sondra Banks, PT GP 2    85994030459 HC PT MANUAL THERAPY EA 15 MIN 11/25/2020 Sondra Banks, PT GP 1    16105751751 HC PT HOT OR COLD PACK TREAT MCARE 11/25/2020 Sondra Banks, PT GP 1                    Sondra Banks PT  11/25/2020

## 2020-12-04 ENCOUNTER — OFFICE VISIT (OUTPATIENT)
Dept: ORTHOPEDIC SURGERY | Facility: CLINIC | Age: 85
End: 2020-12-04

## 2020-12-04 VITALS — HEIGHT: 70 IN | TEMPERATURE: 97.1 F | BODY MASS INDEX: 25.48 KG/M2 | WEIGHT: 178 LBS

## 2020-12-04 DIAGNOSIS — M19.019 SHOULDER ARTHRITIS: ICD-10-CM

## 2020-12-04 DIAGNOSIS — Z96.612 STATUS POST REVERSE TOTAL REPLACEMENT OF LEFT SHOULDER: Primary | ICD-10-CM

## 2020-12-04 PROCEDURE — 73030 X-RAY EXAM OF SHOULDER: CPT | Performed by: ORTHOPAEDIC SURGERY

## 2020-12-04 PROCEDURE — 99214 OFFICE O/P EST MOD 30 MIN: CPT | Performed by: ORTHOPAEDIC SURGERY

## 2020-12-04 RX ORDER — ACETAMINOPHEN 325 MG/1
1000 TABLET ORAL ONCE
Status: CANCELLED | OUTPATIENT
Start: 2021-01-14 | End: 2020-12-04

## 2020-12-04 RX ORDER — MELOXICAM 15 MG/1
15 TABLET ORAL ONCE
Status: CANCELLED | OUTPATIENT
Start: 2021-01-14 | End: 2020-12-04

## 2020-12-04 RX ORDER — CEFAZOLIN SODIUM 2 G/100ML
2 INJECTION, SOLUTION INTRAVENOUS ONCE
Status: CANCELLED | OUTPATIENT
Start: 2021-01-14 | End: 2020-12-04

## 2020-12-04 RX ORDER — PREGABALIN 75 MG/1
150 CAPSULE ORAL ONCE
Status: CANCELLED | OUTPATIENT
Start: 2021-01-14 | End: 2020-12-04

## 2020-12-04 NOTE — PROGRESS NOTES
Patient:Josue Rothman    YOB: 1935    Medical Record Number:[id number]    Chief Complaints: Follow-up now 3 months status post left reverse total shoulder, worsening right shoulder pain    History of Present Illness:     85 y.o. male patient who presents for follow-up of his left shoulder.  He says it is doing great.  He is very happy with his outcome on the left side.  He has not quite recovered full motion or strength but he feels like things are steadily improving.    The right shoulder seems to be worse.  He reports difficulty with routine daily activities.  His pain is throbbing/aching, constant and severe.  He would like to move forward with getting the right shoulder surgery scheduled.    Allergies:  Allergies   Allergen Reactions   • Penicillins Rash       Home Medications:    Current Outpatient Medications:   •  aspirin 81 MG EC tablet, Take 81 mg by mouth Every 3 (Three) Days., Disp: , Rfl:   •  atorvastatin (LIPITOR) 40 MG tablet, Take 20 mg by mouth Every Night., Disp: , Rfl:   •  celecoxib (CeleBREX) 200 MG capsule, Take 200 mg by mouth Daily., Disp: , Rfl:   •  cephalexin (KEFLEX) 500 MG capsule, Take 4 pills one hour prior to procedure, Disp: 4 capsule, Rfl: 2  •  co-enzyme Q-10 30 MG capsule, Take 30 mg by mouth Daily. HOLD PRIOR TO SURGERY, Disp: , Rfl:   •  Cod Liver Oil capsule, Take 1 capsule by mouth Daily. HOLD PRIOR TO SURGERY, Disp: , Rfl:   •  dilTIAZem XR (Dilt-XR) 120 MG 24 hr capsule, Take 120 mg by mouth 2 (two) times a day., Disp: , Rfl:   •  docusate sodium 100 MG capsule, Take 100 mg by mouth 2 (Two) Times a Day As Needed (Constipation)., Disp: , Rfl:   •  ELIQUIS 5 MG tablet tablet, Take 5 mg by mouth Every Evening. HOLD PRIOR TO SURGERY 09-, Disp: , Rfl:   •  finasteride (PROSCAR) 5 MG tablet, Take 5 mg by mouth Daily., Disp: , Rfl:   •  Ginger, Zingiber officinalis, (GINGER ROOT PO), Take 1 tablet by mouth Daily., Disp: , Rfl:   •   Glucosamine-Chondroit-Vit C-Mn (GLUCOSAMINE 1500 COMPLEX PO), Take 1 tablet by mouth Daily., Disp: , Rfl:   •  ondansetron (ZOFRAN) 4 MG tablet, Take 1 tablet by mouth Every 6 (Six) Hours As Needed for Nausea or Vomiting., Disp: 12 tablet, Rfl: 0  •  Pyridoxine HCl (B-6 PO), Take 1 tablet by mouth Daily., Disp: , Rfl:   •  tamsulosin (FLOMAX) 0.4 MG capsule 24 hr capsule, Take 1 capsule by mouth Every Other Day., Disp: , Rfl:   No current facility-administered medications for this visit.     Facility-Administered Medications Ordered in Other Visits:   •  mupirocin (BACTROBAN) 2 % nasal ointment 3 application, 3 application, Nasal, BID, Jayden Holbrook MD    Past Medical History:   Diagnosis Date   • Afib (CMS/[facility]) 12/02/2018   • Arthritis     OSTEO   • Elevated cholesterol    • Hyperlipidemia    • Left shoulder pain    • Limited range of motion (ROM) of shoulder    • Renal disorder        Past Surgical History:   Procedure Laterality Date   • BACK SURGERY  2009    RODS & SCREWS   • HERNIA REPAIR     • KIDNEY STONE SURGERY     • TOTAL SHOULDER ARTHROPLASTY W/ DISTAL CLAVICLE EXCISION Left 9/10/2020    Procedure: TOTAL SHOULDER REVERSE ARTHROPLASTY;  Surgeon: Jayden Holbrook MD;  Location: [facility];  Service: Orthopedics;  Laterality: Left;       Social History     Occupational History   • Not on file   Tobacco Use   • Smoking status: Never Smoker   • Smokeless tobacco: Never Used   Substance and Sexual Activity   • Alcohol use: Yes     Alcohol/week: 3.0 standard drinks     Types: 1 Glasses of wine, 1 Cans of beer, 1 Shots of liquor per week     Comment: VERY SELDOM   • Drug use: Defer   • Sexual activity: Defer      Social History     Social History Narrative   • Not on file       Family History   Problem Relation Age of Onset   • Malig Hyperthermia Neg Hx        Review of Systems:      Constitutional: Denies fever, shaking or chills   Eyes: Denies change in visual acuity   HEENT: Denies nasal congestion or  "sore throat   Respiratory: Denies cough or shortness of breath   Cardiovascular: Denies chest pain or edema  Endocrine: Denies tremors, palpitations, intolerance of heat or cold, polyuria, polydipsia.  GI: Denies abdominal pain, nausea, vomiting, bloody stools or diarrhea  : Denies frequency, urgency, incontinence, retention, or nocturia.  Musculoskeletal: Denies numbness, tingling or loss of motor function except as above  Integument: Denies rash, lesion or ulceration   Neurologic: Denies headache or focal weakness, deficits  Heme: Denies spontaneous or excessive bleeding, epistaxis, hematuria, melena, fatigue, enlarged or tender lymph nodes.      All other pertinent positives and negatives as noted above in HPI.    Physical Exam:85 y.o. male  Vitals:    12/04/20 1122   Temp: 97.1 °F (36.2 °C)   Weight: 80.7 kg (178 lb)   Height: 176.5 cm (69.5\")       General:  Patient is awake and alert.  Appears in no acute distress or discomfort.    Psych:  Affect and demeanor are appropriate.    Extremities: Left shoulder is examined.  Incision is healed.  Motion is coming along but not full.  Forward elevation is about 150 degrees, abduction about 90 degrees, external rotation about 50 degrees.  He is still weak with abduction and elevation.  No significant tenderness.  No effusion.    Right shoulder examined.  Skin is benign.  Moderate anterior tenderness.  Moderate bursal effusion.  Passive shoulder motion is good.  Active motion is limited and painful.  He cannot abduct beyond about 60 degrees or elevate beyond about 80 degrees.  Normal motor and sensory function in his lower arm and hand.  Palpable radial pulse.  Brisk capillary refill.    Imaging: AP, scapular Y and attempted axillary views of the left shoulder are ordered and reviewed today.  These are compared to previous x-rays.  Implants appear well fixed and well-positioned.  No complicating process noted.    Right shoulder x-rays from July are reviewed.  He has " end-stage right shoulder cuff tear arthropathy with superior glenoid erosion.    Assessment/Plan: 1.  3-month status post left reverse total shoulder arthroplasty 2.  End-stage right rotator cuff tear arthropathy    His left shoulder seems to be doing fine.  I advised him to continue his therapy efforts.  Full recovery may take him 6 months to a year.    The risk, benefits and alternatives to a right reverse total shoulder arthroplasty were carefully discussed.  He would like to move forward with that.  I will have my office contact him about getting this scheduled.  We will see him back for a preoperative visit.    Jayden Holbrook MD    12/04/2020

## 2020-12-09 ENCOUNTER — HOSPITAL ENCOUNTER (OUTPATIENT)
Dept: PHYSICAL THERAPY | Facility: HOSPITAL | Age: 85
Setting detail: THERAPIES SERIES
Discharge: HOME | End: 2020-12-09

## 2020-12-09 DIAGNOSIS — M25.512 ACUTE PAIN OF LEFT SHOULDER: Primary | ICD-10-CM

## 2020-12-09 DIAGNOSIS — M25.611 DECREASED RIGHT SHOULDER RANGE OF MOTION: ICD-10-CM

## 2020-12-09 DIAGNOSIS — Z47.89 ORTHOPEDIC AFTERCARE: ICD-10-CM

## 2020-12-09 PROCEDURE — 97140 MANUAL THERAPY 1/> REGIONS: CPT

## 2020-12-09 PROCEDURE — 97110 THERAPEUTIC EXERCISES: CPT

## 2020-12-09 NOTE — THERAPY DISCHARGE NOTE
Outpatient Physical Therapy Ortho Treatment Note/Discharge Summary   [place]     Patient Name: Josue Rothman  : 1935  MRN: [id number]  Today's Date: 2020      Visit Date: 2020    Visit Dx:    ICD-10-CM ICD-9-CM   1. Acute pain of left shoulder  M25.512 719.41   2. Decreased right shoulder range of motion  M25.611 719.51   3. Orthopedic aftercare  Z47.89 V54.9       Patient Active Problem List   Diagnosis   • Bilateral shoulder pain   • Shoulder arthritis   • Status post reverse total replacement of left shoulder        Past Medical History:   Diagnosis Date   • Afib (CMS/[facility]) 2018   • Arthritis     OSTEO   • Elevated cholesterol    • Hyperlipidemia    • Left shoulder pain    • Limited range of motion (ROM) of shoulder    • Renal disorder         Past Surgical History:   Procedure Laterality Date   • BACK SURGERY      RODS & SCREWS   • HERNIA REPAIR     • KIDNEY STONE SURGERY     • TOTAL SHOULDER ARTHROPLASTY W/ DISTAL CLAVICLE EXCISION Left 9/10/2020    Procedure: TOTAL SHOULDER REVERSE ARTHROPLASTY;  Surgeon: Jayden Holbrook MD;  Location: [facility];  Service: Orthopedics;  Laterality: Left;                           Modalities     Row Name 20 0800             Ice    Ice Applied  Yes  -CN      Location  L shoulder  -CN      Rx Minutes  10 mins  -CN      Ice S/P Rx  Yes  -CN        User Key  (r) = Recorded By, (t) = Taken By, (c) = Cosigned By    Initials Name Provider Type    Sondra Joe, PT Physical Therapist          OP Exercises     Row Name 20 0820 0700          Subjective Comments    Subjective Comments  It is feeling really good, the motion keeps improving. I am planning to have the right side done on .   -CN  --        Subjective Pain    Able to rate subjective pain?  yes  -CN  --     Pre-Treatment Pain Level  1  -CN  --        Total Minutes    61663 - PT Therapeutic Exercise Minutes  35  -CN  --     35258 - PT  Manual Therapy Minutes  --  10  -CN        Exercise 1    Exercise Name 1  supine flex with TB  -CN  --     Cueing 1  Verbal;Demo  -CN  --     Reps 1  10  -CN  --     Additional Comments  YTB  -CN  --        Exercise 3    Exercise Name 3  Scap squeeze  -CN  --     Cueing 3  Verbal;Demo  -CN  --     Reps 3  15  -CN  --        Exercise 4    Exercise Name 4  Post shoulder rolls  -CN  --     Cueing 4  Verbal;Demo  -CN  --     Reps 4  10  -CN  --     Time 4  5  -CN  --        Exercise 8    Exercise Name 8  Sh pulley for flexion & scaption as tolerated  -CN  --     Time 8  4 min  -CN  --        Exercise 10    Exercise Name 10  UBE   -CN  --     Time 10  4 min  -CN  --        Exercise 13    Exercise Name 13  supine ABC  -CN  --     Cueing 13  Verbal  -CN  --     Sets 13  1 set  -CN  --     Additional Comments  2#  -CN  --        Exercise 14    Exercise Name 14  S/L flex to hz AB to ad  -CN  --     Cueing 14  Verbal;Tactile  -CN  --     Reps 14  10  -CN  --        Exercise 15    Exercise Name 15  S/L ER  -CN  --     Cueing 15  Verbal;Tactile  -CN  --     Sets 15  2  -CN  --     Reps 15  8  -CN  --     Additional Comments  1#  -CN  --        Exercise 16    Exercise Name 16  shelf reach  -CN  --     Cueing 16  Verbal;Tactile  -CN  --     Reps 16  10  -CN  --     Additional Comments  1#  -CN  --       User Key  (r) = Recorded By, (t) = Taken By, (c) = Cosigned By    Initials Name Provider Type    Sondra Joe, PT Physical Therapist                        Manual Rx (last 36 hours)      Manual Treatments     Row Name 12/09/20 0700             Total Minutes    47293 - PT Manual Therapy Minutes  10  -CN         Manual Rx 1    Manual Rx 1 Location  L shoulder  -CN      Manual Rx 1 Type  PROM, oscillations to G-H jt  -CN      Manual Rx 1 Grade  (IR PROM in scapular plane <45 degrees per protocol)  -CN      Manual Rx 1 Duration  10  -CN        User Key  (r) = Recorded By, (t) = Taken By, (c) = Cosigned By    Initials  Name Provider Type    Sondra Joe, ALLYN Physical Therapist          PT OP Goals     Row Name 12/09/20 0800          PT Short Term Goals    STG Date to Achieve  10/28/20  -CN     STG 1  Pt will be independent with initial HEP for symptom management.  -CN     STG 1 Progress  Met  -CN     STG 2  Pt will be compliant with precautions including sling usage and ROM restrictions in order to reduce potential of dislocation.   -CN     STG 2 Progress  Met  -CN        Long Term Goals    LTG Date to Achieve  11/28/20  -CN     LTG 1  Pt will be independent and compliant with advanced HEP for long term management of symptoms and prevention of future occurrence.   -CN     LTG 1 Progress  Met  -CN     LTG 2  Pt will reduce level of perceived disability as measured by the quick DASH  to 15% in order to improve QOL.  -CN     LTG 2 Progress  Met  -CN     LTG 3  Pt will demonstrate L shoulder ROM to flex=140, abd=135 and LUCIAN=T1 in order to improve ability to perform ADLs.   -CN     LTG 3 Progress  Partially Met  -CN     LTG 3 Progress Comments  Pt with greatest limitation into AROM abduction, however reports able to use functionally.   -CN     LTG 4  Pt will demonstrate L shoulder strength to 4/5 all planes in order to return to normalized gym activities.   -CN     LTG 4 Progress  Partially Met  -CN     LTG 4 Progress Comments  Pt with slight limitation into abduction/flexion, and discussed use of HEP for further strengthening in upcoming months.   -CN       User Key  (r) = Recorded By, (t) = Taken By, (c) = Cosigned By    Initials Name Provider Type    Sondra Joe, ALLYN Physical Therapist          Therapy Education  Given: Symptoms/condition management, Posture/body mechanics, Mobility training  Program: Reinforced  How Provided: Verbal, Demonstration  Provided to: Patient  Level of Understanding: Teach back education performed, Verbalized, Demonstrated    Outcome Measure Options: Quick DASH(9% where 0% is no  disability)         Time Calculation:   Start Time: 0830  Stop Time: 0925  Time Calculation (min): 55 min  Therapy Charges for Today     Code Description Service Date Service Provider Modifiers Qty    46468955929 HC PT THER PROC EA 15 MIN 12/9/2020 Sondra Banks, PT GP 2    29214618901 HC PT MANUAL THERAPY EA 15 MIN 12/9/2020 Sondra Banks, PT GP 1    98557230760 HC PT HOT OR COLD PACK TREAT MCARE 12/9/2020 Sondra Banks, PT GP 1          PT G-Codes  Outcome Measure Options: Quick DASH(9% where 0% is no disability)     OP PT Discharge Summary  Date of Discharge: 12/09/20  Reason for Discharge: Independent, Maximum functional potential achieved  Outcomes Achieved: Patient able to partially acheive established goals  Discharge Destination: Home with home program  Discharge Instructions/Additional Comments: Pt has attended skilled PT following L reverse TSA. Pt reports 95% return of function and independent with HEP. Pt continues with limitations into OH ROM, most noted into abduction and will continue to work on strengthening in upcoming months. Pt planning to undergo R reverse TRS on 11/14 and will return for PT at that time.      Sondra Banks, ALLYN  12/9/2020

## 2020-12-21 ENCOUNTER — TELEPHONE (OUTPATIENT)
Dept: ORTHOPEDIC SURGERY | Facility: CLINIC | Age: 85
End: 2020-12-21

## 2020-12-22 NOTE — TELEPHONE ENCOUNTER
ADDENDUM TO PREVIOUS MESSAGE:  HE WANTS TO KNOW IF HE SHOULD OR SHOULDN'T GET THE VACCINE IN RIGHT ARM AS HE'S HAVING SURGERY ON HIS RIGHT SHOULDER?  PLEASE ADVISE?    IS THERE A TIME FRAME WITH GETTING THE VACCINE BEFORE SURGERY?

## 2020-12-22 NOTE — TELEPHONE ENCOUNTER
No specific timeframe with regards to the vaccine.   I do not want him getting the vaccine in the surgical arm for at least 4 weeks before or after the surgery. He can get the vaccine anytime before or after surgery and I am fine with it, as long as he gets it in the other arm.     Also, when you call him back, please thank him for the nice gift that he left for me.  Thanks.

## 2020-12-23 ENCOUNTER — TRANSCRIBE ORDERS (OUTPATIENT)
Dept: PREADMISSION TESTING | Facility: HOSPITAL | Age: 85
End: 2020-12-23

## 2020-12-23 DIAGNOSIS — Z01.818 OTHER SPECIFIED PRE-OPERATIVE EXAMINATION: Primary | ICD-10-CM

## 2020-12-29 ENCOUNTER — APPOINTMENT (OUTPATIENT)
Dept: PREADMISSION TESTING | Facility: HOSPITAL | Age: 85
End: 2020-12-29

## 2020-12-29 VITALS
SYSTOLIC BLOOD PRESSURE: 181 MMHG | OXYGEN SATURATION: 97 % | DIASTOLIC BLOOD PRESSURE: 65 MMHG | TEMPERATURE: 97.8 F | RESPIRATION RATE: 20 BRPM | HEART RATE: 72 BPM | HEIGHT: 70 IN | BODY MASS INDEX: 26.05 KG/M2 | WEIGHT: 182 LBS

## 2020-12-29 DIAGNOSIS — M19.019 SHOULDER ARTHRITIS: ICD-10-CM

## 2020-12-29 LAB
ANION GAP SERPL CALCULATED.3IONS-SCNC: 6.9 MMOL/L (ref 5–15)
BUN SERPL-MCNC: 18 MG/DL (ref 8–23)
BUN/CREAT SERPL: 22.8 (ref 7–25)
CALCIUM SPEC-SCNC: 9.3 MG/DL (ref 8.6–10.5)
CHLORIDE SERPL-SCNC: 103 MMOL/L (ref 98–107)
CO2 SERPL-SCNC: 30.1 MMOL/L (ref 22–29)
CREAT SERPL-MCNC: 0.79 MG/DL (ref 0.76–1.27)
DEPRECATED RDW RBC AUTO: 47.6 FL (ref 37–54)
ERYTHROCYTE [DISTWIDTH] IN BLOOD BY AUTOMATED COUNT: 14.2 % (ref 12.3–15.4)
GFR SERPL CREATININE-BSD FRML MDRD: 93 ML/MIN/1.73
GLUCOSE SERPL-MCNC: 126 MG/DL (ref 65–99)
HCT VFR BLD AUTO: 40.4 % (ref 37.5–51)
HGB BLD-MCNC: 13.7 G/DL (ref 13–17.7)
MCH RBC QN AUTO: 31.6 PG (ref 26.6–33)
MCHC RBC AUTO-ENTMCNC: 33.9 G/DL (ref 31.5–35.7)
MCV RBC AUTO: 93.1 FL (ref 79–97)
PLATELET # BLD AUTO: 157 10*3/MM3 (ref 140–450)
PMV BLD AUTO: 9 FL (ref 6–12)
POTASSIUM SERPL-SCNC: 4.7 MMOL/L (ref 3.5–5.2)
RBC # BLD AUTO: 4.34 10*6/MM3 (ref 4.14–5.8)
SODIUM SERPL-SCNC: 140 MMOL/L (ref 136–145)
WBC # BLD AUTO: 5.39 10*3/MM3 (ref 3.4–10.8)

## 2020-12-29 PROCEDURE — 63710000001 MUPIROCIN 2 % OINTMENT: Performed by: ORTHOPAEDIC SURGERY

## 2020-12-29 PROCEDURE — 85027 COMPLETE CBC AUTOMATED: CPT

## 2020-12-29 PROCEDURE — 36415 COLL VENOUS BLD VENIPUNCTURE: CPT

## 2020-12-29 PROCEDURE — 80048 BASIC METABOLIC PNL TOTAL CA: CPT

## 2020-12-29 PROCEDURE — A9270 NON-COVERED ITEM OR SERVICE: HCPCS | Performed by: ORTHOPAEDIC SURGERY

## 2020-12-29 RX ORDER — NAPROXEN SOD/DIPHENHYDRAMINE 220MG-25MG
1 TABLET ORAL EVERY MORNING
COMMUNITY
End: 2021-01-15 | Stop reason: HOSPADM

## 2020-12-29 RX ORDER — GABAPENTIN 300 MG/1
300 CAPSULE ORAL NIGHTLY
COMMUNITY

## 2020-12-29 RX ORDER — UBIDECARENONE 100 MG
100 CAPSULE ORAL DAILY
COMMUNITY

## 2020-12-29 RX ORDER — CHLORHEXIDINE GLUCONATE 500 MG/1
1 CLOTH TOPICAL
COMMUNITY
End: 2021-01-15 | Stop reason: HOSPADM

## 2020-12-29 RX ORDER — NAPROXEN SODIUM 220 MG
220 TABLET ORAL 2 TIMES DAILY PRN
COMMUNITY
End: 2021-01-15 | Stop reason: HOSPADM

## 2020-12-29 RX ORDER — TURMERIC 400 MG
400 CAPSULE ORAL EVERY MORNING
COMMUNITY
End: 2021-01-15 | Stop reason: HOSPADM

## 2021-01-08 ENCOUNTER — TELEPHONE (OUTPATIENT)
Dept: ORTHOPEDIC SURGERY | Facility: CLINIC | Age: 86
End: 2021-01-08

## 2021-01-11 ENCOUNTER — OFFICE VISIT (OUTPATIENT)
Dept: ORTHOPEDIC SURGERY | Facility: CLINIC | Age: 86
End: 2021-01-11

## 2021-01-11 VITALS — HEIGHT: 70 IN | TEMPERATURE: 97.9 F | BODY MASS INDEX: 25.8 KG/M2 | WEIGHT: 180.2 LBS

## 2021-01-11 DIAGNOSIS — M17.12 PRIMARY OSTEOARTHRITIS OF LEFT KNEE: ICD-10-CM

## 2021-01-11 DIAGNOSIS — M11.262 CHONDROCALCINOSIS OF LEFT KNEE: Primary | ICD-10-CM

## 2021-01-11 PROCEDURE — 20610 DRAIN/INJ JOINT/BURSA W/O US: CPT | Performed by: NURSE PRACTITIONER

## 2021-01-11 PROCEDURE — S0260 H&P FOR SURGERY: HCPCS | Performed by: NURSE PRACTITIONER

## 2021-01-11 RX ORDER — METHYLPREDNISOLONE ACETATE 80 MG/ML
80 INJECTION, SUSPENSION INTRA-ARTICULAR; INTRALESIONAL; INTRAMUSCULAR; SOFT TISSUE
Status: COMPLETED | OUTPATIENT
Start: 2021-01-11 | End: 2021-01-11

## 2021-01-11 RX ADMIN — METHYLPREDNISOLONE ACETATE 80 MG: 80 INJECTION, SUSPENSION INTRA-ARTICULAR; INTRALESIONAL; INTRAMUSCULAR; SOFT TISSUE at 09:04

## 2021-01-11 NOTE — H&P (VIEW-ONLY)
History & Physical         Patient: Josue Rothman    YOB: 1935    Medical Record Number: [id number]    Chief Complaints:   Chief Complaint   Patient presents with   • Right Shoulder - Pre-op Exam   • Left Knee - Follow-up, Injections       History of Present Illness: 85 y.o. male comes in today of upcoming shoulder replacement surgery. Reports a long history of progressively worsening pain, motion loss, and dysfunction.  Describes current pain as severe.  Has failed prolonged conservative treatment.  Denies any new complaints or issues.    Reports the left knee injection he received in September helped tremendously.  He is interested in getting a repeat injection today.    Allergies:   Allergies   Allergen Reactions   • Penicillins Rash       Medications:   Home Medications:    Current Outpatient Medications:   •  aspirin 81 MG EC tablet, Take 81 mg by mouth Every 3 (Three) Days. Pt to stop before surgery, Disp: , Rfl:   •  atorvastatin (LIPITOR) 40 MG tablet, Take 20 mg by mouth Every Night., Disp: , Rfl:   •  celecoxib (CeleBREX) 200 MG capsule, Take 200 mg by mouth Daily., Disp: , Rfl:   •  Chlorhexidine Gluconate Cloth 2 % pads, Apply 1 application topically. USE AS DIRECTED PREOP, Disp: , Rfl:   •  Cod Liver Oil capsule, Take 1 capsule by mouth Daily. HOLD PRIOR TO SURGERY, Disp: , Rfl:   •  coenzyme Q10 100 MG capsule, Take 100 mg by mouth Daily., Disp: , Rfl:   •  dilTIAZem XR (Dilt-XR) 120 MG 24 hr capsule, Take 120 mg by mouth Every Morning., Disp: , Rfl:   •  ELIQUIS 5 MG tablet tablet, Take 5 mg by mouth Every 12 (Twelve) Hours. HOLD PRIOR TO SURGERY, Disp: , Rfl:   •  finasteride (PROSCAR) 5 MG tablet, Take 5 mg by mouth Daily., Disp: , Rfl:   •  Fish Oil-Cholecalciferol (OMEGA-3 + D PO), Take 1 capsule by mouth Every Morning., Disp: , Rfl:   •  gabapentin (NEURONTIN) 300 MG capsule, Take 300 mg by mouth Every Night., Disp: , Rfl:   •  Jossy, Zingiber officinalis, (JOSSY ROOT  PO), Take 550 mg by mouth Daily., Disp: , Rfl:   •  Glucosamine HCl-MSM (GLUCOSAMINE-MSM PO), Take 1 capsule by mouth Every Morning., Disp: , Rfl:   •  mupirocin (BACTROBAN) 2 % nasal ointment, 1 application into the nostril(s) as directed by provider. USE AS DIRECTED PREOP, Disp: , Rfl:   •  Naproxen Sod-diphenhydrAMINE (Aleve PM) 220-25 MG tablet, Take 1 tablet by mouth Every Morning., Disp: , Rfl:   •  naproxen sodium (ALEVE) 220 MG tablet, Take 220 mg by mouth 2 (Two) Times a Day As Needed., Disp: , Rfl:   •  Pyridoxine HCl (B-6 PO), Take 100 mg by mouth Daily., Disp: , Rfl:   •  tamsulosin (FLOMAX) 0.4 MG capsule 24 hr capsule, Take 1 capsule by mouth Daily., Disp: , Rfl:   •  Turmeric 400 MG capsule, Take 400 mg by mouth Every Morning., Disp: , Rfl:   No current facility-administered medications for this visit.     Facility-Administered Medications Ordered in Other Visits:   •  Chlorhexidine Gluconate 2 % pads 1 each, 1 each, Apply externally, Take As Directed, Jayden Holbrook MD  •  mupirocin (BACTROBAN) 2 % nasal ointment 3 application, 3 application, Nasal, BID, Jayden Holbrook MD    Past Medical History:   Diagnosis Date   • Afib (CMS/[facility]) 12/02/2018   • Arthritis     OSTEO   • Elevated cholesterol    • History of kidney stones    • History of pneumonia    • History of transfusion 1952    no reaction   • Hyperlipidemia    • Limited range of motion (ROM) of shoulder     right   • Renal disorder           Past Surgical History:   Procedure Laterality Date   • BACK SURGERY  2009    RODS & SCREWS   • CATARACT EXTRACTION EXTRACAPSULAR W/ INTRAOCULAR LENS IMPLANTATION Bilateral    • COLONOSCOPY     • HERNIA REPAIR Bilateral     2x on left inguinal & 1 time on right inguinal   • KIDNEY STONE SURGERY     • TOTAL SHOULDER ARTHROPLASTY W/ DISTAL CLAVICLE EXCISION Left 9/10/2020    Procedure: TOTAL SHOULDER REVERSE ARTHROPLASTY;  Surgeon: Jayden Holbrook MD;  Location: [facility];  Service: Orthopedics;   "Laterality: Left;          Social History     Occupational History   • Not on file   Tobacco Use   • Smoking status: Never Smoker   • Smokeless tobacco: Never Used   Substance and Sexual Activity   • Alcohol use: Yes     Alcohol/week: 3.0 standard drinks     Types: 1 Glasses of wine, 1 Cans of beer, 1 Shots of liquor per week   • Drug use: Never   • Sexual activity: Not on file      Social History     Social History Narrative   • Not on file          Family History   Problem Relation Age of Onset   • Malig Hyperthermia Neg Hx        Review of Systems:     Constitutional:  Denies fever, shaking or chills   Eyes:  Denies change in visual acuity   HEENT:  Denies nasal congestion or sore throat   Respiratory:  Denies cough or shortness of breath   Cardiovascular:  Denies chest pain or edema   GI:  Denies abdominal pain, nausea, vomiting, bloody stools or diarrhea   Musculoskeletal:  Denies numbness tingling or loss of motor function except as outlined above in history of present illness.  Integument:  Denies rash, lesion or ulceration   Neurologic:  Denies headache or focal weakness, deficits      All other pertinent positives and negatives as noted above in HPI.    Physical Exam: 85 y.o. male    Vitals:    01/11/21 0839   Temp: 97.9 °F (36.6 °C)   Weight: 81.7 kg (180 lb 3.2 oz)   Height: 176.5 cm (69.5\")     General:  Patient is awake and alert.  Appears in no acute distress or discomfort.    Psych:  Affect and demeanor are appropriate.    Eyes:  Conjunctiva and sclera appear grossly normal.  Eyes track well and EOM seem to be intact.    Ears:  No gross abnormalities.  Hearing adequate for the exam.    Cardiovascular:  Regular rate and rhythm.    Lungs:  Good chest expansion.  Breathing unlabored.    Lymph:  No palpable adenopathy about neck or axilla.    Neck:  Supple.  Normal ROM.  Negative Spurling's for shoulder or arm pain.    Right upper extremity:  Skin benign and intact without evidence for swelling, masses or " atrophy.  No palpable masses.  Moderate anterior tenderness.  Shoulder ROM limited and uncomfortable.  No evident instability or apprehension.  Weakness with elevation in the scapular plane and external rotation.  Deltoid fires on exam.  Good strength in the lower arm and hand.  Intact sensation throughout the arm and hand palpable radial pulse with brisk cap refill.    Diagnostic Tests:  Lab Results   Component Value Date    GLUCOSE 126 (H) 12/29/2020    CALCIUM 9.3 12/29/2020     12/29/2020    K 4.7 12/29/2020    CO2 30.1 (H) 12/29/2020     12/29/2020    BUN 18 12/29/2020    CREATININE 0.79 12/29/2020    EGFRIFNONA 93 12/29/2020    BCR 22.8 12/29/2020    ANIONGAP 6.9 12/29/2020     Lab Results   Component Value Date    WBC 5.39 12/29/2020    HGB 13.7 12/29/2020    HCT 40.4 12/29/2020    MCV 93.1 12/29/2020     12/29/2020     No results found for: INR, PROTIME    Imaging:  Previous x-rays of the shoulder are reviewed.  The x-rays show severe rotator cuff tear arthropathy with an absent acromiohumeral interval.    Assessment:  1.  Right shoulder rotator cuff tear arthropathy  2.  Left knee osteoarthritis with meniscal chondrocalcinosis    Plan: We had a thorough discussion regarding the risks, benefits and alternatives to an arthroplasty and non-surgical management versus surgery.  I explained that surgical risks include infection, hematoma, hardware related complications including failure of fixation, loosening, fracture, persistent pain and/or loss of motion, iatrogenic nerve and/or blood vessel injury resulting in permanent weakness, numbness or dysfunction, DVT, PE, positioning related neuropraxia, and anesthesia related complications resulting in death.  We discussed the indication for a reverse as opposed to a standard total shoulder and the risks inherent to the reverse including notching, glenoid loosening, instability, and traction related neuropraxia, any of which could result in  persistent pain or problems requiring further surgery.  Lastly, we discussed the rehab and all that will be expected of the patient post operatively to ensure an optimal outcome.  The patient voiced understanding of the risks, benefits, and alternative forms of treatment that were discussed and the patient consents to proceed with the reverse arthroplasty.      For his left knee, the risk, benefits, alternatives to an injection were thoroughly discussed.  Patient consented to proceed.  The injection was performed as noted below.  Going forward he will follow-up with me as needed.    Large Joint Arthrocentesis: L knee  Date/Time: 1/11/2021 9:04 AM  Consent given by: patient  Site marked: site marked  Timeout: Immediately prior to procedure a time out was called to verify the correct patient, procedure, equipment, support staff and site/side marked as required   Supporting Documentation  Indications: pain   Procedure Details  Location: knee - L knee  Preparation: Patient was prepped and draped in the usual sterile fashion  Needle gauge: 21g.  Approach: anterolateral  Medications administered: 2 mL lidocaine (cardiac); 80 mg methylPREDNISolone acetate 80 MG/ML  Patient tolerance: patient tolerated the procedure well with no immediate complications        Nohemy Perez, APRN     01/11/21

## 2021-01-11 NOTE — PROGRESS NOTES
History & Physical         Patient: Josue Rothman    YOB: 1935    Medical Record Number: [id number]    Chief Complaints:   Chief Complaint   Patient presents with   • Right Shoulder - Pre-op Exam   • Left Knee - Follow-up, Injections       History of Present Illness: 85 y.o. male comes in today of upcoming shoulder replacement surgery. Reports a long history of progressively worsening pain, motion loss, and dysfunction.  Describes current pain as severe.  Has failed prolonged conservative treatment.  Denies any new complaints or issues.    Reports the left knee injection he received in September helped tremendously.  He is interested in getting a repeat injection today.    Allergies:   Allergies   Allergen Reactions   • Penicillins Rash       Medications:   Home Medications:    Current Outpatient Medications:   •  aspirin 81 MG EC tablet, Take 81 mg by mouth Every 3 (Three) Days. Pt to stop before surgery, Disp: , Rfl:   •  atorvastatin (LIPITOR) 40 MG tablet, Take 20 mg by mouth Every Night., Disp: , Rfl:   •  celecoxib (CeleBREX) 200 MG capsule, Take 200 mg by mouth Daily., Disp: , Rfl:   •  Chlorhexidine Gluconate Cloth 2 % pads, Apply 1 application topically. USE AS DIRECTED PREOP, Disp: , Rfl:   •  Cod Liver Oil capsule, Take 1 capsule by mouth Daily. HOLD PRIOR TO SURGERY, Disp: , Rfl:   •  coenzyme Q10 100 MG capsule, Take 100 mg by mouth Daily., Disp: , Rfl:   •  dilTIAZem XR (Dilt-XR) 120 MG 24 hr capsule, Take 120 mg by mouth Every Morning., Disp: , Rfl:   •  ELIQUIS 5 MG tablet tablet, Take 5 mg by mouth Every 12 (Twelve) Hours. HOLD PRIOR TO SURGERY, Disp: , Rfl:   •  finasteride (PROSCAR) 5 MG tablet, Take 5 mg by mouth Daily., Disp: , Rfl:   •  Fish Oil-Cholecalciferol (OMEGA-3 + D PO), Take 1 capsule by mouth Every Morning., Disp: , Rfl:   •  gabapentin (NEURONTIN) 300 MG capsule, Take 300 mg by mouth Every Night., Disp: , Rfl:   •  Jossy, Zingiber officinalis, (JOSSY ROOT  PO), Take 550 mg by mouth Daily., Disp: , Rfl:   •  Glucosamine HCl-MSM (GLUCOSAMINE-MSM PO), Take 1 capsule by mouth Every Morning., Disp: , Rfl:   •  mupirocin (BACTROBAN) 2 % nasal ointment, 1 application into the nostril(s) as directed by provider. USE AS DIRECTED PREOP, Disp: , Rfl:   •  Naproxen Sod-diphenhydrAMINE (Aleve PM) 220-25 MG tablet, Take 1 tablet by mouth Every Morning., Disp: , Rfl:   •  naproxen sodium (ALEVE) 220 MG tablet, Take 220 mg by mouth 2 (Two) Times a Day As Needed., Disp: , Rfl:   •  Pyridoxine HCl (B-6 PO), Take 100 mg by mouth Daily., Disp: , Rfl:   •  tamsulosin (FLOMAX) 0.4 MG capsule 24 hr capsule, Take 1 capsule by mouth Daily., Disp: , Rfl:   •  Turmeric 400 MG capsule, Take 400 mg by mouth Every Morning., Disp: , Rfl:   No current facility-administered medications for this visit.     Facility-Administered Medications Ordered in Other Visits:   •  Chlorhexidine Gluconate 2 % pads 1 each, 1 each, Apply externally, Take As Directed, Jayden Holbrook MD  •  mupirocin (BACTROBAN) 2 % nasal ointment 3 application, 3 application, Nasal, BID, Jayden Holbrook MD    Past Medical History:   Diagnosis Date   • Afib (CMS/[facility], located within [facility]) 12/02/2018   • Arthritis     OSTEO   • Elevated cholesterol    • History of kidney stones    • History of pneumonia    • History of transfusion 1952    no reaction   • Hyperlipidemia    • Limited range of motion (ROM) of shoulder     right   • Renal disorder           Past Surgical History:   Procedure Laterality Date   • BACK SURGERY  2009    RODS & SCREWS   • CATARACT EXTRACTION EXTRACAPSULAR W/ INTRAOCULAR LENS IMPLANTATION Bilateral    • COLONOSCOPY     • HERNIA REPAIR Bilateral     2x on left inguinal & 1 time on right inguinal   • KIDNEY STONE SURGERY     • TOTAL SHOULDER ARTHROPLASTY W/ DISTAL CLAVICLE EXCISION Left 9/10/2020    Procedure: TOTAL SHOULDER REVERSE ARTHROPLASTY;  Surgeon: Jayden Holbrook MD;  Location: [facility] OR;  Service: Orthopedics;   "Laterality: Left;          Social History     Occupational History   • Not on file   Tobacco Use   • Smoking status: Never Smoker   • Smokeless tobacco: Never Used   Substance and Sexual Activity   • Alcohol use: Yes     Alcohol/week: 3.0 standard drinks     Types: 1 Glasses of wine, 1 Cans of beer, 1 Shots of liquor per week   • Drug use: Never   • Sexual activity: Not on file      Social History     Social History Narrative   • Not on file          Family History   Problem Relation Age of Onset   • Malig Hyperthermia Neg Hx        Review of Systems:     Constitutional:  Denies fever, shaking or chills   Eyes:  Denies change in visual acuity   HEENT:  Denies nasal congestion or sore throat   Respiratory:  Denies cough or shortness of breath   Cardiovascular:  Denies chest pain or edema   GI:  Denies abdominal pain, nausea, vomiting, bloody stools or diarrhea   Musculoskeletal:  Denies numbness tingling or loss of motor function except as outlined above in history of present illness.  Integument:  Denies rash, lesion or ulceration   Neurologic:  Denies headache or focal weakness, deficits      All other pertinent positives and negatives as noted above in HPI.    Physical Exam: 85 y.o. male    Vitals:    01/11/21 0839   Temp: 97.9 °F (36.6 °C)   Weight: 81.7 kg (180 lb 3.2 oz)   Height: 176.5 cm (69.5\")     General:  Patient is awake and alert.  Appears in no acute distress or discomfort.    Psych:  Affect and demeanor are appropriate.    Eyes:  Conjunctiva and sclera appear grossly normal.  Eyes track well and EOM seem to be intact.    Ears:  No gross abnormalities.  Hearing adequate for the exam.    Cardiovascular:  Regular rate and rhythm.    Lungs:  Good chest expansion.  Breathing unlabored.    Lymph:  No palpable adenopathy about neck or axilla.    Neck:  Supple.  Normal ROM.  Negative Spurling's for shoulder or arm pain.    Right upper extremity:  Skin benign and intact without evidence for swelling, masses or " atrophy.  No palpable masses.  Moderate anterior tenderness.  Shoulder ROM limited and uncomfortable.  No evident instability or apprehension.  Weakness with elevation in the scapular plane and external rotation.  Deltoid fires on exam.  Good strength in the lower arm and hand.  Intact sensation throughout the arm and hand palpable radial pulse with brisk cap refill.    Diagnostic Tests:  Lab Results   Component Value Date    GLUCOSE 126 (H) 12/29/2020    CALCIUM 9.3 12/29/2020     12/29/2020    K 4.7 12/29/2020    CO2 30.1 (H) 12/29/2020     12/29/2020    BUN 18 12/29/2020    CREATININE 0.79 12/29/2020    EGFRIFNONA 93 12/29/2020    BCR 22.8 12/29/2020    ANIONGAP 6.9 12/29/2020     Lab Results   Component Value Date    WBC 5.39 12/29/2020    HGB 13.7 12/29/2020    HCT 40.4 12/29/2020    MCV 93.1 12/29/2020     12/29/2020     No results found for: INR, PROTIME    Imaging:  Previous x-rays of the shoulder are reviewed.  The x-rays show severe rotator cuff tear arthropathy with an absent acromiohumeral interval.    Assessment:  1.  Right shoulder rotator cuff tear arthropathy  2.  Left knee osteoarthritis with meniscal chondrocalcinosis    Plan: We had a thorough discussion regarding the risks, benefits and alternatives to an arthroplasty and non-surgical management versus surgery.  I explained that surgical risks include infection, hematoma, hardware related complications including failure of fixation, loosening, fracture, persistent pain and/or loss of motion, iatrogenic nerve and/or blood vessel injury resulting in permanent weakness, numbness or dysfunction, DVT, PE, positioning related neuropraxia, and anesthesia related complications resulting in death.  We discussed the indication for a reverse as opposed to a standard total shoulder and the risks inherent to the reverse including notching, glenoid loosening, instability, and traction related neuropraxia, any of which could result in  persistent pain or problems requiring further surgery.  Lastly, we discussed the rehab and all that will be expected of the patient post operatively to ensure an optimal outcome.  The patient voiced understanding of the risks, benefits, and alternative forms of treatment that were discussed and the patient consents to proceed with the reverse arthroplasty.      For his left knee, the risk, benefits, alternatives to an injection were thoroughly discussed.  Patient consented to proceed.  The injection was performed as noted below.  Going forward he will follow-up with me as needed.    Large Joint Arthrocentesis: L knee  Date/Time: 1/11/2021 9:04 AM  Consent given by: patient  Site marked: site marked  Timeout: Immediately prior to procedure a time out was called to verify the correct patient, procedure, equipment, support staff and site/side marked as required   Supporting Documentation  Indications: pain   Procedure Details  Location: knee - L knee  Preparation: Patient was prepped and draped in the usual sterile fashion  Needle gauge: 21g.  Approach: anterolateral  Medications administered: 2 mL lidocaine (cardiac); 80 mg methylPREDNISolone acetate 80 MG/ML  Patient tolerance: patient tolerated the procedure well with no immediate complications        Nohemy Perez, APRN     01/11/21

## 2021-01-12 ENCOUNTER — LAB (OUTPATIENT)
Dept: LAB | Facility: HOSPITAL | Age: 86
End: 2021-01-12

## 2021-01-12 DIAGNOSIS — Z01.818 OTHER SPECIFIED PRE-OPERATIVE EXAMINATION: ICD-10-CM

## 2021-01-12 PROCEDURE — U0004 COV-19 TEST NON-CDC HGH THRU: HCPCS

## 2021-01-12 PROCEDURE — C9803 HOPD COVID-19 SPEC COLLECT: HCPCS

## 2021-01-13 LAB — SARS-COV-2 ORF1AB RESP QL NAA+PROBE: NOT DETECTED

## 2021-01-14 ENCOUNTER — ANESTHESIA EVENT (OUTPATIENT)
Dept: PERIOP | Facility: HOSPITAL | Age: 86
End: 2021-01-14

## 2021-01-14 ENCOUNTER — APPOINTMENT (OUTPATIENT)
Dept: GENERAL RADIOLOGY | Facility: HOSPITAL | Age: 86
End: 2021-01-14

## 2021-01-14 ENCOUNTER — ANESTHESIA (OUTPATIENT)
Dept: PERIOP | Facility: HOSPITAL | Age: 86
End: 2021-01-14

## 2021-01-14 ENCOUNTER — HOSPITAL ENCOUNTER (INPATIENT)
Facility: HOSPITAL | Age: 86
LOS: 1 days | Discharge: HOME | End: 2021-01-15
Attending: ORTHOPAEDIC SURGERY | Admitting: ORTHOPAEDIC SURGERY

## 2021-01-14 DIAGNOSIS — M19.019 SHOULDER ARTHRITIS: ICD-10-CM

## 2021-01-14 DIAGNOSIS — Z96.611 S/P REVERSE TOTAL SHOULDER ARTHROPLASTY, RIGHT: Primary | ICD-10-CM

## 2021-01-14 PROCEDURE — 25010000002 FENTANYL CITRATE (PF) 100 MCG/2ML SOLUTION: Performed by: ANESTHESIOLOGY

## 2021-01-14 PROCEDURE — 23472 RECONSTRUCT SHOULDER JOINT: CPT | Performed by: ORTHOPAEDIC SURGERY

## 2021-01-14 PROCEDURE — C1713 ANCHOR/SCREW BN/BN,TIS/BN: HCPCS | Performed by: ORTHOPAEDIC SURGERY

## 2021-01-14 PROCEDURE — 25010000002 VANCOMYCIN 10 G RECONSTITUTED SOLUTION: Performed by: ORTHOPAEDIC SURGERY

## 2021-01-14 PROCEDURE — 73020 X-RAY EXAM OF SHOULDER: CPT

## 2021-01-14 PROCEDURE — 0RRJ00Z REPLACEMENT OF RIGHT SHOULDER JOINT WITH REVERSE BALL AND SOCKET SYNTHETIC SUBSTITUTE, OPEN APPROACH: ICD-10-PCS | Performed by: ORTHOPAEDIC SURGERY

## 2021-01-14 PROCEDURE — 25010000002 MIDAZOLAM PER 1 MG: Performed by: ANESTHESIOLOGY

## 2021-01-14 PROCEDURE — 25010000002 ONDANSETRON PER 1 MG: Performed by: NURSE ANESTHETIST, CERTIFIED REGISTERED

## 2021-01-14 PROCEDURE — 25010000002 PROPOFOL 10 MG/ML EMULSION: Performed by: NURSE ANESTHETIST, CERTIFIED REGISTERED

## 2021-01-14 PROCEDURE — 25010000003 CEFAZOLIN IN DEXTROSE 2-4 GM/100ML-% SOLUTION: Performed by: ORTHOPAEDIC SURGERY

## 2021-01-14 PROCEDURE — 25010000002 DEXAMETHASONE PER 1 MG: Performed by: NURSE ANESTHETIST, CERTIFIED REGISTERED

## 2021-01-14 PROCEDURE — C9290 INJ, BUPIVACAINE LIPOSOME: HCPCS | Performed by: ANESTHESIOLOGY

## 2021-01-14 PROCEDURE — 25010000002 NEOSTIGMINE PER 0.5 MG: Performed by: NURSE ANESTHETIST, CERTIFIED REGISTERED

## 2021-01-14 PROCEDURE — 76942 ECHO GUIDE FOR BIOPSY: CPT | Performed by: ORTHOPAEDIC SURGERY

## 2021-01-14 PROCEDURE — 94799 UNLISTED PULMONARY SVC/PX: CPT

## 2021-01-14 PROCEDURE — 25010000003 BUPIVACAINE LIPOSOME 1.3 % SUSPENSION: Performed by: ANESTHESIOLOGY

## 2021-01-14 PROCEDURE — C1776 JOINT DEVICE (IMPLANTABLE): HCPCS | Performed by: ORTHOPAEDIC SURGERY

## 2021-01-14 DEVICE — IMPLANTABLE DEVICE
Type: IMPLANTABLE DEVICE | Site: SHOULDER | Status: FUNCTIONAL
Brand: COMPREHENSIVE® REVERSE SHOULDER

## 2021-01-14 DEVICE — SCRW FIX LK HEX 4.75X15MM: Type: IMPLANTABLE DEVICE | Site: SHOULDER | Status: FUNCTIONAL

## 2021-01-14 DEVICE — KNOTLESS TISSUE CONTROL DEVICE, UNDYED UNIDIRECTIONAL (ANTIBACTERIAL) SYNTHETIC ABSORBABLE DEVICE
Type: IMPLANTABLE DEVICE | Site: SHOULDER | Status: FUNCTIONAL
Brand: STRATAFIX

## 2021-01-14 DEVICE — BASEPLT AUG W/MINI TPR ADAPT LG: Type: IMPLANTABLE DEVICE | Site: SHOULDER | Status: FUNCTIONAL

## 2021-01-14 DEVICE — SCRW FIX LK HEX 4.75X25MM: Type: IMPLANTABLE DEVICE | Site: SHOULDER | Status: FUNCTIONAL

## 2021-01-14 DEVICE — TOTL SHLDER REV: Type: IMPLANTABLE DEVICE | Site: SHOULDER | Status: FUNCTIONAL

## 2021-01-14 DEVICE — STEM HUM/SHLDR COMPREHENSIVE MINI 16X83MM: Type: IMPLANTABLE DEVICE | Site: SHOULDER | Status: FUNCTIONAL

## 2021-01-14 DEVICE — TRY HUM/SHLDR COMPREHENSIVE/REVERSE MINI COCR STD 40MM: Type: IMPLANTABLE DEVICE | Site: SHOULDER | Status: FUNCTIONAL

## 2021-01-14 DEVICE — BLUE CO-BRAID POLYETHYLENE SIZE 2 38" C-7 NEEDLE BIOMET
Type: IMPLANTABLE DEVICE | Site: SHOULDER | Status: FUNCTIONAL
Brand: MAXBRAID PE

## 2021-01-14 DEVICE — SCRW FIX LK HEX 4.75X20MM: Type: IMPLANTABLE DEVICE | Site: SHOULDER | Status: FUNCTIONAL

## 2021-01-14 DEVICE — BEAR HUM PROLNG STD 40MM: Type: IMPLANTABLE DEVICE | Site: SHOULDER | Status: FUNCTIONAL

## 2021-01-14 DEVICE — COMP GLEN VERSA/DIAL PLS3 40MM: Type: IMPLANTABLE DEVICE | Site: SHOULDER | Status: FUNCTIONAL

## 2021-01-14 RX ORDER — FENTANYL CITRATE 50 UG/ML
50 INJECTION, SOLUTION INTRAMUSCULAR; INTRAVENOUS
Status: DISCONTINUED | OUTPATIENT
Start: 2021-01-14 | End: 2021-01-14

## 2021-01-14 RX ORDER — LIDOCAINE HYDROCHLORIDE 20 MG/ML
INJECTION, SOLUTION INFILTRATION; PERINEURAL AS NEEDED
Status: DISCONTINUED | OUTPATIENT
Start: 2021-01-14 | End: 2021-01-14 | Stop reason: SURG

## 2021-01-14 RX ORDER — SODIUM CHLORIDE 0.9 % (FLUSH) 0.9 %
10 SYRINGE (ML) INJECTION AS NEEDED
Status: DISCONTINUED | OUTPATIENT
Start: 2021-01-14 | End: 2021-01-15 | Stop reason: HOSPADM

## 2021-01-14 RX ORDER — MELOXICAM 15 MG/1
15 TABLET ORAL DAILY
Status: DISCONTINUED | OUTPATIENT
Start: 2021-01-14 | End: 2021-01-15

## 2021-01-14 RX ORDER — SODIUM CHLORIDE, SODIUM LACTATE, POTASSIUM CHLORIDE, CALCIUM CHLORIDE 600; 310; 30; 20 MG/100ML; MG/100ML; MG/100ML; MG/100ML
9 INJECTION, SOLUTION INTRAVENOUS CONTINUOUS
Status: DISCONTINUED | OUTPATIENT
Start: 2021-01-14 | End: 2021-01-14 | Stop reason: HOSPADM

## 2021-01-14 RX ORDER — FAMOTIDINE 10 MG/ML
20 INJECTION, SOLUTION INTRAVENOUS ONCE
Status: COMPLETED | OUTPATIENT
Start: 2021-01-14 | End: 2021-01-14

## 2021-01-14 RX ORDER — PROMETHAZINE HYDROCHLORIDE 25 MG/1
25 TABLET ORAL ONCE AS NEEDED
Status: DISCONTINUED | OUTPATIENT
Start: 2021-01-14 | End: 2021-01-14 | Stop reason: HOSPADM

## 2021-01-14 RX ORDER — LIDOCAINE HYDROCHLORIDE 10 MG/ML
0.5 INJECTION, SOLUTION EPIDURAL; INFILTRATION; INTRACAUDAL; PERINEURAL ONCE AS NEEDED
Status: DISCONTINUED | OUTPATIENT
Start: 2021-01-14 | End: 2021-01-14 | Stop reason: HOSPADM

## 2021-01-14 RX ORDER — HYDROCODONE BITARTRATE AND ACETAMINOPHEN 7.5; 325 MG/1; MG/1
1 TABLET ORAL ONCE AS NEEDED
Status: DISCONTINUED | OUTPATIENT
Start: 2021-01-14 | End: 2021-01-14 | Stop reason: HOSPADM

## 2021-01-14 RX ORDER — SODIUM CHLORIDE 0.9 % (FLUSH) 0.9 %
3-10 SYRINGE (ML) INJECTION AS NEEDED
Status: DISCONTINUED | OUTPATIENT
Start: 2021-01-14 | End: 2021-01-14 | Stop reason: HOSPADM

## 2021-01-14 RX ORDER — HYDROCODONE BITARTRATE AND ACETAMINOPHEN 7.5; 325 MG/1; MG/1
1 TABLET ORAL EVERY 4 HOURS PRN
Status: DISCONTINUED | OUTPATIENT
Start: 2021-01-14 | End: 2021-01-15 | Stop reason: HOSPADM

## 2021-01-14 RX ORDER — MIDAZOLAM HYDROCHLORIDE 1 MG/ML
1 INJECTION INTRAMUSCULAR; INTRAVENOUS
Status: DISCONTINUED | OUTPATIENT
Start: 2021-01-14 | End: 2021-01-14 | Stop reason: HOSPADM

## 2021-01-14 RX ORDER — ONDANSETRON 2 MG/ML
4 INJECTION INTRAMUSCULAR; INTRAVENOUS ONCE AS NEEDED
Status: DISCONTINUED | OUTPATIENT
Start: 2021-01-14 | End: 2021-01-14 | Stop reason: HOSPADM

## 2021-01-14 RX ORDER — PREGABALIN 75 MG/1
150 CAPSULE ORAL ONCE
Status: COMPLETED | OUTPATIENT
Start: 2021-01-14 | End: 2021-01-14

## 2021-01-14 RX ORDER — FENTANYL CITRATE 50 UG/ML
50 INJECTION, SOLUTION INTRAMUSCULAR; INTRAVENOUS
Status: DISCONTINUED | OUTPATIENT
Start: 2021-01-14 | End: 2021-01-14 | Stop reason: HOSPADM

## 2021-01-14 RX ORDER — EPHEDRINE SULFATE 50 MG/ML
INJECTION, SOLUTION INTRAVENOUS AS NEEDED
Status: DISCONTINUED | OUTPATIENT
Start: 2021-01-14 | End: 2021-01-14 | Stop reason: SURG

## 2021-01-14 RX ORDER — ATORVASTATIN CALCIUM 20 MG/1
20 TABLET, FILM COATED ORAL NIGHTLY
Status: DISCONTINUED | OUTPATIENT
Start: 2021-01-14 | End: 2021-01-15 | Stop reason: HOSPADM

## 2021-01-14 RX ORDER — MIDAZOLAM HYDROCHLORIDE 1 MG/ML
1 INJECTION INTRAMUSCULAR; INTRAVENOUS
Status: DISCONTINUED | OUTPATIENT
Start: 2021-01-14 | End: 2021-01-14

## 2021-01-14 RX ORDER — LIDOCAINE HYDROCHLORIDE 10 MG/ML
0.5 INJECTION, SOLUTION EPIDURAL; INFILTRATION; INTRACAUDAL; PERINEURAL ONCE AS NEEDED
Status: DISCONTINUED | OUTPATIENT
Start: 2021-01-14 | End: 2021-01-14

## 2021-01-14 RX ORDER — ONDANSETRON 2 MG/ML
INJECTION INTRAMUSCULAR; INTRAVENOUS AS NEEDED
Status: DISCONTINUED | OUTPATIENT
Start: 2021-01-14 | End: 2021-01-14 | Stop reason: SURG

## 2021-01-14 RX ORDER — SODIUM CHLORIDE 0.9 % (FLUSH) 0.9 %
3-10 SYRINGE (ML) INJECTION AS NEEDED
Status: DISCONTINUED | OUTPATIENT
Start: 2021-01-14 | End: 2021-01-14

## 2021-01-14 RX ORDER — CEFAZOLIN SODIUM 2 G/100ML
2 INJECTION, SOLUTION INTRAVENOUS EVERY 8 HOURS
Status: COMPLETED | OUTPATIENT
Start: 2021-01-14 | End: 2021-01-15

## 2021-01-14 RX ORDER — MELOXICAM 15 MG/1
15 TABLET ORAL ONCE
Status: COMPLETED | OUTPATIENT
Start: 2021-01-14 | End: 2021-01-14

## 2021-01-14 RX ORDER — NALOXONE HCL 0.4 MG/ML
0.2 VIAL (ML) INJECTION AS NEEDED
Status: DISCONTINUED | OUTPATIENT
Start: 2021-01-14 | End: 2021-01-14 | Stop reason: HOSPADM

## 2021-01-14 RX ORDER — ACETAMINOPHEN 325 MG/1
650 TABLET ORAL EVERY 4 HOURS PRN
Status: DISCONTINUED | OUTPATIENT
Start: 2021-01-14 | End: 2021-01-15 | Stop reason: HOSPADM

## 2021-01-14 RX ORDER — TRANEXAMIC ACID 100 MG/ML
INJECTION, SOLUTION INTRAVENOUS AS NEEDED
Status: DISCONTINUED | OUTPATIENT
Start: 2021-01-14 | End: 2021-01-14 | Stop reason: SURG

## 2021-01-14 RX ORDER — POLYETHYLENE GLYCOL 3350 17 G/17G
17 POWDER, FOR SOLUTION ORAL DAILY
Status: DISCONTINUED | OUTPATIENT
Start: 2021-01-14 | End: 2021-01-15 | Stop reason: HOSPADM

## 2021-01-14 RX ORDER — SODIUM CHLORIDE 0.9 % (FLUSH) 0.9 %
3 SYRINGE (ML) INJECTION EVERY 12 HOURS SCHEDULED
Status: DISCONTINUED | OUTPATIENT
Start: 2021-01-14 | End: 2021-01-14 | Stop reason: HOSPADM

## 2021-01-14 RX ORDER — FAMOTIDINE 10 MG/ML
20 INJECTION, SOLUTION INTRAVENOUS ONCE
Status: DISCONTINUED | OUTPATIENT
Start: 2021-01-14 | End: 2021-01-14

## 2021-01-14 RX ORDER — TAMSULOSIN HYDROCHLORIDE 0.4 MG/1
0.4 CAPSULE ORAL DAILY
Status: DISCONTINUED | OUTPATIENT
Start: 2021-01-14 | End: 2021-01-15 | Stop reason: HOSPADM

## 2021-01-14 RX ORDER — ACETAMINOPHEN 325 MG/1
650 TABLET ORAL ONCE AS NEEDED
Status: DISCONTINUED | OUTPATIENT
Start: 2021-01-14 | End: 2021-01-14 | Stop reason: HOSPADM

## 2021-01-14 RX ORDER — LABETALOL HYDROCHLORIDE 5 MG/ML
5 INJECTION, SOLUTION INTRAVENOUS
Status: DISCONTINUED | OUTPATIENT
Start: 2021-01-14 | End: 2021-01-14 | Stop reason: HOSPADM

## 2021-01-14 RX ORDER — ONDANSETRON 4 MG/1
4 TABLET, FILM COATED ORAL EVERY 6 HOURS PRN
Status: DISCONTINUED | OUTPATIENT
Start: 2021-01-14 | End: 2021-01-15 | Stop reason: HOSPADM

## 2021-01-14 RX ORDER — PROMETHAZINE HYDROCHLORIDE 25 MG/1
25 SUPPOSITORY RECTAL ONCE AS NEEDED
Status: DISCONTINUED | OUTPATIENT
Start: 2021-01-14 | End: 2021-01-14 | Stop reason: HOSPADM

## 2021-01-14 RX ORDER — HYDROMORPHONE HYDROCHLORIDE 1 MG/ML
0.5 INJECTION, SOLUTION INTRAMUSCULAR; INTRAVENOUS; SUBCUTANEOUS
Status: DISCONTINUED | OUTPATIENT
Start: 2021-01-14 | End: 2021-01-15 | Stop reason: HOSPADM

## 2021-01-14 RX ORDER — SODIUM CHLORIDE 0.9 % (FLUSH) 0.9 %
3 SYRINGE (ML) INJECTION EVERY 12 HOURS SCHEDULED
Status: DISCONTINUED | OUTPATIENT
Start: 2021-01-14 | End: 2021-01-15 | Stop reason: HOSPADM

## 2021-01-14 RX ORDER — PROPOFOL 10 MG/ML
VIAL (ML) INTRAVENOUS AS NEEDED
Status: DISCONTINUED | OUTPATIENT
Start: 2021-01-14 | End: 2021-01-14 | Stop reason: SURG

## 2021-01-14 RX ORDER — BUPIVACAINE HYDROCHLORIDE AND EPINEPHRINE 5; 5 MG/ML; UG/ML
INJECTION, SOLUTION EPIDURAL; INTRACAUDAL; PERINEURAL
Status: COMPLETED | OUTPATIENT
Start: 2021-01-14 | End: 2021-01-14

## 2021-01-14 RX ORDER — HYDROMORPHONE HYDROCHLORIDE 1 MG/ML
0.5 INJECTION, SOLUTION INTRAMUSCULAR; INTRAVENOUS; SUBCUTANEOUS
Status: DISCONTINUED | OUTPATIENT
Start: 2021-01-14 | End: 2021-01-14 | Stop reason: HOSPADM

## 2021-01-14 RX ORDER — GLYCOPYRROLATE 0.2 MG/ML
INJECTION INTRAMUSCULAR; INTRAVENOUS AS NEEDED
Status: DISCONTINUED | OUTPATIENT
Start: 2021-01-14 | End: 2021-01-14 | Stop reason: SURG

## 2021-01-14 RX ORDER — SODIUM CHLORIDE, SODIUM LACTATE, POTASSIUM CHLORIDE, CALCIUM CHLORIDE 600; 310; 30; 20 MG/100ML; MG/100ML; MG/100ML; MG/100ML
100 INJECTION, SOLUTION INTRAVENOUS CONTINUOUS
Status: DISCONTINUED | OUTPATIENT
Start: 2021-01-14 | End: 2021-01-15 | Stop reason: HOSPADM

## 2021-01-14 RX ORDER — CELECOXIB 200 MG/1
200 CAPSULE ORAL DAILY
Status: DISCONTINUED | OUTPATIENT
Start: 2021-01-14 | End: 2021-01-15 | Stop reason: HOSPADM

## 2021-01-14 RX ORDER — GABAPENTIN 300 MG/1
300 CAPSULE ORAL NIGHTLY
Status: DISCONTINUED | OUTPATIENT
Start: 2021-01-14 | End: 2021-01-15 | Stop reason: HOSPADM

## 2021-01-14 RX ORDER — SODIUM CHLORIDE, SODIUM LACTATE, POTASSIUM CHLORIDE, CALCIUM CHLORIDE 600; 310; 30; 20 MG/100ML; MG/100ML; MG/100ML; MG/100ML
9 INJECTION, SOLUTION INTRAVENOUS CONTINUOUS
Status: DISCONTINUED | OUTPATIENT
Start: 2021-01-14 | End: 2021-01-14

## 2021-01-14 RX ORDER — HYDROCODONE BITARTRATE AND ACETAMINOPHEN 7.5; 325 MG/1; MG/1
2 TABLET ORAL EVERY 4 HOURS PRN
Status: DISCONTINUED | OUTPATIENT
Start: 2021-01-14 | End: 2021-01-15 | Stop reason: HOSPADM

## 2021-01-14 RX ORDER — AMOXICILLIN 250 MG
2 CAPSULE ORAL 2 TIMES DAILY
Status: DISCONTINUED | OUTPATIENT
Start: 2021-01-14 | End: 2021-01-15 | Stop reason: HOSPADM

## 2021-01-14 RX ORDER — DIPHENHYDRAMINE HCL 25 MG
25 CAPSULE ORAL
Status: DISCONTINUED | OUTPATIENT
Start: 2021-01-14 | End: 2021-01-14 | Stop reason: HOSPADM

## 2021-01-14 RX ORDER — DIPHENHYDRAMINE HYDROCHLORIDE 50 MG/ML
12.5 INJECTION INTRAMUSCULAR; INTRAVENOUS
Status: DISCONTINUED | OUTPATIENT
Start: 2021-01-14 | End: 2021-01-14 | Stop reason: HOSPADM

## 2021-01-14 RX ORDER — HYDRALAZINE HYDROCHLORIDE 20 MG/ML
5 INJECTION INTRAMUSCULAR; INTRAVENOUS
Status: DISCONTINUED | OUTPATIENT
Start: 2021-01-14 | End: 2021-01-14 | Stop reason: HOSPADM

## 2021-01-14 RX ORDER — ROCURONIUM BROMIDE 10 MG/ML
INJECTION, SOLUTION INTRAVENOUS AS NEEDED
Status: DISCONTINUED | OUTPATIENT
Start: 2021-01-14 | End: 2021-01-14 | Stop reason: SURG

## 2021-01-14 RX ORDER — FLUMAZENIL 0.1 MG/ML
0.2 INJECTION INTRAVENOUS AS NEEDED
Status: DISCONTINUED | OUTPATIENT
Start: 2021-01-14 | End: 2021-01-14 | Stop reason: HOSPADM

## 2021-01-14 RX ORDER — EPHEDRINE SULFATE 50 MG/ML
5 INJECTION, SOLUTION INTRAVENOUS ONCE AS NEEDED
Status: DISCONTINUED | OUTPATIENT
Start: 2021-01-14 | End: 2021-01-14 | Stop reason: HOSPADM

## 2021-01-14 RX ORDER — NALOXONE HCL 0.4 MG/ML
0.1 VIAL (ML) INJECTION
Status: DISCONTINUED | OUTPATIENT
Start: 2021-01-14 | End: 2021-01-15 | Stop reason: HOSPADM

## 2021-01-14 RX ORDER — ACETAMINOPHEN 325 MG/1
1000 TABLET ORAL ONCE
Status: COMPLETED | OUTPATIENT
Start: 2021-01-14 | End: 2021-01-14

## 2021-01-14 RX ORDER — MAGNESIUM HYDROXIDE 1200 MG/15ML
LIQUID ORAL AS NEEDED
Status: DISCONTINUED | OUTPATIENT
Start: 2021-01-14 | End: 2021-01-14 | Stop reason: HOSPADM

## 2021-01-14 RX ORDER — BISACODYL 10 MG
10 SUPPOSITORY, RECTAL RECTAL DAILY PRN
Status: DISCONTINUED | OUTPATIENT
Start: 2021-01-14 | End: 2021-01-15 | Stop reason: HOSPADM

## 2021-01-14 RX ORDER — DILTIAZEM HYDROCHLORIDE 120 MG/1
120 CAPSULE, COATED, EXTENDED RELEASE ORAL
Status: DISCONTINUED | OUTPATIENT
Start: 2021-01-14 | End: 2021-01-15 | Stop reason: HOSPADM

## 2021-01-14 RX ORDER — DEXAMETHASONE SODIUM PHOSPHATE 10 MG/ML
INJECTION INTRAMUSCULAR; INTRAVENOUS AS NEEDED
Status: DISCONTINUED | OUTPATIENT
Start: 2021-01-14 | End: 2021-01-14 | Stop reason: SURG

## 2021-01-14 RX ORDER — BUPIVACAINE HYDROCHLORIDE 2.5 MG/ML
INJECTION, SOLUTION EPIDURAL; INFILTRATION; INTRACAUDAL
Status: COMPLETED | OUTPATIENT
Start: 2021-01-14 | End: 2021-01-14

## 2021-01-14 RX ORDER — FINASTERIDE 5 MG/1
5 TABLET, FILM COATED ORAL DAILY
Status: DISCONTINUED | OUTPATIENT
Start: 2021-01-14 | End: 2021-01-15 | Stop reason: HOSPADM

## 2021-01-14 RX ORDER — ONDANSETRON 2 MG/ML
4 INJECTION INTRAMUSCULAR; INTRAVENOUS EVERY 6 HOURS PRN
Status: DISCONTINUED | OUTPATIENT
Start: 2021-01-14 | End: 2021-01-15 | Stop reason: HOSPADM

## 2021-01-14 RX ORDER — CEFAZOLIN SODIUM 2 G/100ML
2 INJECTION, SOLUTION INTRAVENOUS ONCE
Status: COMPLETED | OUTPATIENT
Start: 2021-01-14 | End: 2021-01-14

## 2021-01-14 RX ADMIN — FENTANYL CITRATE 50 MCG: 50 INJECTION, SOLUTION INTRAMUSCULAR; INTRAVENOUS at 08:34

## 2021-01-14 RX ADMIN — DOCUSATE SODIUM 50MG AND SENNOSIDES 8.6MG 2 TABLET: 8.6; 5 TABLET, FILM COATED ORAL at 20:41

## 2021-01-14 RX ADMIN — SODIUM CHLORIDE, PRESERVATIVE FREE 3 ML: 5 INJECTION INTRAVENOUS at 15:57

## 2021-01-14 RX ADMIN — POLYETHYLENE GLYCOL 3350 17 G: 17 POWDER, FOR SOLUTION ORAL at 15:56

## 2021-01-14 RX ADMIN — GABAPENTIN 300 MG: 300 CAPSULE ORAL at 20:42

## 2021-01-14 RX ADMIN — BUPIVACAINE HYDROCHLORIDE AND EPINEPHRINE BITARTRATE 10 ML: 5; .0091 INJECTION, SOLUTION EPIDURAL; INTRACAUDAL; PERINEURAL at 08:39

## 2021-01-14 RX ADMIN — SODIUM CHLORIDE, POTASSIUM CHLORIDE, SODIUM LACTATE AND CALCIUM CHLORIDE 9 ML/HR: 600; 310; 30; 20 INJECTION, SOLUTION INTRAVENOUS at 08:10

## 2021-01-14 RX ADMIN — PREGABALIN 150 MG: 75 CAPSULE ORAL at 07:48

## 2021-01-14 RX ADMIN — DILTIAZEM HYDROCHLORIDE 120 MG: 120 CAPSULE, COATED, EXTENDED RELEASE ORAL at 15:56

## 2021-01-14 RX ADMIN — SODIUM CHLORIDE, POTASSIUM CHLORIDE, SODIUM LACTATE AND CALCIUM CHLORIDE: 600; 310; 30; 20 INJECTION, SOLUTION INTRAVENOUS at 11:04

## 2021-01-14 RX ADMIN — MELOXICAM 15 MG: 15 TABLET ORAL at 17:19

## 2021-01-14 RX ADMIN — VANCOMYCIN HYDROCHLORIDE 1250 MG: 10 INJECTION, POWDER, LYOPHILIZED, FOR SOLUTION INTRAVENOUS at 07:48

## 2021-01-14 RX ADMIN — PROPOFOL 150 MG: 10 INJECTION, EMULSION INTRAVENOUS at 09:42

## 2021-01-14 RX ADMIN — GLYCOPYRROLATE 0.6 MG: 0.2 INJECTION INTRAMUSCULAR; INTRAVENOUS at 11:16

## 2021-01-14 RX ADMIN — ONDANSETRON HYDROCHLORIDE 4 MG: 2 SOLUTION INTRAMUSCULAR; INTRAVENOUS at 10:54

## 2021-01-14 RX ADMIN — MIDAZOLAM 1 MG: 1 INJECTION INTRAMUSCULAR; INTRAVENOUS at 08:35

## 2021-01-14 RX ADMIN — SODIUM CHLORIDE, POTASSIUM CHLORIDE, SODIUM LACTATE AND CALCIUM CHLORIDE 100 ML/HR: 600; 310; 30; 20 INJECTION, SOLUTION INTRAVENOUS at 13:53

## 2021-01-14 RX ADMIN — BUPIVACAINE 10 ML: 13.3 INJECTION, SUSPENSION, LIPOSOMAL INFILTRATION at 08:39

## 2021-01-14 RX ADMIN — BUPIVACAINE HYDROCHLORIDE 15 ML: 2.5 INJECTION, SOLUTION EPIDURAL; INFILTRATION; INTRACAUDAL; PERINEURAL at 08:39

## 2021-01-14 RX ADMIN — ACETAMINOPHEN 975 MG: 325 TABLET, FILM COATED ORAL at 07:48

## 2021-01-14 RX ADMIN — DEXAMETHASONE SODIUM PHOSPHATE 4 MG: 10 INJECTION INTRAMUSCULAR; INTRAVENOUS at 09:53

## 2021-01-14 RX ADMIN — VANCOMYCIN HYDROCHLORIDE 1250 MG: 10 INJECTION, POWDER, LYOPHILIZED, FOR SOLUTION INTRAVENOUS at 19:21

## 2021-01-14 RX ADMIN — TRANEXAMIC ACID 1000 MG: 100 INJECTION, SOLUTION INTRAVENOUS at 10:08

## 2021-01-14 RX ADMIN — FAMOTIDINE 20 MG: 10 INJECTION INTRAVENOUS at 08:01

## 2021-01-14 RX ADMIN — EPHEDRINE SULFATE 10 MG: 50 INJECTION INTRAVENOUS at 10:22

## 2021-01-14 RX ADMIN — FINASTERIDE 5 MG: 5 TABLET, FILM COATED ORAL at 15:56

## 2021-01-14 RX ADMIN — MELOXICAM 15 MG: 15 TABLET ORAL at 07:48

## 2021-01-14 RX ADMIN — MUPIROCIN 1 APPLICATION: 20 OINTMENT TOPICAL at 20:42

## 2021-01-14 RX ADMIN — CELECOXIB 200 MG: 200 CAPSULE ORAL at 15:56

## 2021-01-14 RX ADMIN — ROCURONIUM BROMIDE 30 MG: 10 INJECTION INTRAVENOUS at 09:42

## 2021-01-14 RX ADMIN — CEFAZOLIN SODIUM 2 G: 2 INJECTION, SOLUTION INTRAVENOUS at 09:51

## 2021-01-14 RX ADMIN — ATORVASTATIN CALCIUM 20 MG: 20 TABLET, FILM COATED ORAL at 20:41

## 2021-01-14 RX ADMIN — LIDOCAINE HYDROCHLORIDE 60 MG: 20 INJECTION, SOLUTION INFILTRATION; PERINEURAL at 09:42

## 2021-01-14 RX ADMIN — SODIUM CHLORIDE, POTASSIUM CHLORIDE, SODIUM LACTATE AND CALCIUM CHLORIDE 100 ML/HR: 600; 310; 30; 20 INJECTION, SOLUTION INTRAVENOUS at 17:57

## 2021-01-14 RX ADMIN — NEOSTIGMINE METHYLSULFATE 3 MG: 1 INJECTION INTRAMUSCULAR; INTRAVENOUS; SUBCUTANEOUS at 11:16

## 2021-01-14 RX ADMIN — EPHEDRINE SULFATE 10 MG: 50 INJECTION INTRAVENOUS at 10:02

## 2021-01-14 RX ADMIN — TAMSULOSIN HYDROCHLORIDE 0.4 MG: 0.4 CAPSULE ORAL at 17:19

## 2021-01-14 RX ADMIN — CEFAZOLIN SODIUM 2 G: 2 INJECTION, SOLUTION INTRAVENOUS at 17:20

## 2021-01-14 NOTE — ANESTHESIA PREPROCEDURE EVALUATION
Anesthesia Evaluation     Patient summary reviewed and Nursing notes reviewed   NPO Solid Status: > 8 hours  NPO Liquid Status: > 2 hours           Airway   Mallampati: II  TM distance: >3 FB  Neck ROM: full  no difficulty expected  Dental - normal exam     Pulmonary - normal exam   Cardiovascular     ECG reviewed  PT is on anticoagulation therapy  Rhythm: regular  Rate: normal    (+) dysrhythmias Paroxysmal Atrial Fib, hyperlipidemia,       Neuro/Psych  GI/Hepatic/Renal/Endo    (+)   renal disease stones,     Musculoskeletal     Abdominal  - normal exam   Substance History      OB/GYN          Other   arthritis,                      Anesthesia Plan    ASA 3     general with block   (+ISB USGSS for POPC)  intravenous induction     Anesthetic plan, all risks, benefits, and alternatives have been provided, discussed and informed consent has been obtained with: patient.    Plan discussed with CRNA.

## 2021-01-14 NOTE — ANESTHESIA PROCEDURE NOTES
Airway  Urgency: elective    Date/Time: 1/14/2021 9:46 AM  Airway not difficult    General Information and Staff    Patient location during procedure: OR  Anesthesiologist: Sven Dubois DO  CRNA: Dulce Lucas CRNA    Indications and Patient Condition  Indications for airway management: airway protection    Preoxygenated: yes (pt pre-O2 with 100% O2)  Mask difficulty assessment: 2 - vent by mask + OA or adjuvant +/- NMBA (easy BMV )    Final Airway Details  Final airway type: endotracheal airway      Successful airway: ETT  Cuffed: yes   Successful intubation technique: direct laryngoscopy  Facilitating devices/methods: anterior pressure/BURP  Endotracheal tube insertion site: oral  Blade: Hans  Blade size: 4  ETT size (mm): 7.5  Cormack-Lehane Classification: grade I - full view of glottis  Placement verified by: chest auscultation and capnometry   Cuff volume (mL): 7  Measured from: lips  ETT/EBT  to lips (cm): 22  Number of attempts at approach: 1  Assessment: lips, teeth, and gum same as pre-op and atraumatic intubation    Additional Comments  ATOETx1. No change in dentition.

## 2021-01-14 NOTE — SIGNIFICANT NOTE
01/14/21 1432   OTHER   Discipline occupational therapist   Rehab Time/Intention   Session Not Performed other (see comments)  (referral received, pt had surgery today and order is for POD 1 to being pendulum ex and OT eval. follow up tomorrow. 1433)   Recommendation   OT - Next Appointment 01/15/21

## 2021-01-14 NOTE — ANESTHESIA POSTPROCEDURE EVALUATION
"Patient: Josue Rothman    Procedure Summary     Date: 01/14/21 Room / Location: [facility] OR [address] [place] [address] MAIN OR    Anesthesia Start: 0936 Anesthesia Stop: 1127    Procedure: Right Reverse Total Shoulder Arthroplasty (Right Shoulder) Diagnosis:       Shoulder arthritis      (Shoulder arthritis [M19.019])    Surgeon: Jayden Holbrook MD Provider: Sven Dubois DO    Anesthesia Type: general with block ASA Status: 3          Anesthesia Type: general with block    Vitals  Vitals Value Taken Time   /65 01/14/21 1145   Temp 36.4 °C (97.6 °F) 01/14/21 1130   Pulse 73 01/14/21 1149   Resp 16 01/14/21 1145   SpO2 98 % 01/14/21 1149   Vitals shown include unvalidated device data.        Post Anesthesia Care and Evaluation    Patient location during evaluation: bedside  Patient participation: complete - patient participated  Level of consciousness: awake and alert  Pain score: 0  Pain management: adequate  Airway patency: patent  Anesthetic complications: No anesthetic complications  PONV Status: none  Cardiovascular status: acceptable and hemodynamically stable  Respiratory status: acceptable  Hydration status: acceptable    Comments: /65 (BP Location: Left arm, Patient Position: Lying)   Pulse 73   Temp 36.4 °C (97.6 °F) (Oral)   Resp 16   Ht 175.3 cm (69\")   Wt 81.6 kg (180 lb)   SpO2 98%   BMI 26.58 kg/m²     POPC supplied by PNB with continued effect in expected distribution      "

## 2021-01-14 NOTE — ANESTHESIA PROCEDURE NOTES
Peripheral Block    Pre-sedation assessment completed: 1/14/2021 8:33 AM    Patient reassessed immediately prior to procedure    Patient location during procedure: holding area  Start time: 1/14/2021 8:34 AM  Stop time: 1/14/2021 8:40 AM  Reason for block: at surgeon's request and post-op pain management  Performed by  Anesthesiologist: Sven Dubois DO  Preanesthetic Checklist  Completed: patient identified, site marked, surgical consent, pre-op evaluation, timeout performed, IV checked, risks and benefits discussed and monitors and equipment checked  Prep:  Pt Position: sitting  Sterile barriers:gloves, mask, cap and washed/disinfected hands  Prep: ChloraPrep  Patient monitoring: blood pressure monitoring, continuous pulse oximetry and EKG  Procedure  Sedation:yes  Performed under: local infiltration  Guidance:ultrasound guided  ULTRASOUND INTERPRETATION. Using ultrasound guidance a gauge needle was placed in close proximity to the brachial plexus nerve, at which point, under ultrasound guidance anesthetic was injected in the area of the nerve and spread of the anesthesia was seen on ultrasound in close proximity thereto.  There were no abnormalities seen on ultrasound; a digital image was taken; and the patient tolerated the procedure with no complications. Images:still images obtained, printed/placed on chart    Laterality:right  Block Type:interscalene  Injection Technique:single-shot  Needle Type:echogenic  Needle Gauge:22 G  Resistance on Injection: none    Medications Used: bupivacaine liposome (EXPAREL) 1.3 % injection, 10 mL  bupivacaine PF (MARCAINE) 0.25 % injection, 15 mL  bupivacaine-EPINEPHrine PF (MARCAINE w/EPI) 0.5% -[id number] injection, 10 mL  Med admintered at 1/14/2021 8:39 AM      Medications  Comment:Ultrasound Interpretation:  Using ultrasound guidance the needle was placed in close proximity to the brachial plexus and anesthetic was injected in the area of the nerve and spread of  the anesthetic was seen on ultrasound in close proximity thereto and distal along the nerve.  There were no abnormalities seen on ultrasound; a digital image was taken; and the patient tolerated the procedure with no complications.      Post Assessment  Injection Assessment: negative aspiration for heme, no paresthesia on injection and incremental injection  Patient Tolerance:comfortable throughout block  Complications:no

## 2021-01-14 NOTE — BRIEF OP NOTE
TOTAL SHOULDER REVERSE ARTHROPLASTY  Progress Note    Josue Rothman  1/14/2021    Pre-op Diagnosis:   Shoulder arthritis [M19.019]       Post-Op Diagnosis Codes:     * Shoulder arthritis [M19.019]    Procedure/CPT® Codes:        Procedure(s):  Right Reverse Total Shoulder Arthroplasty    Surgeon(s):  Jayden Holbrook MD    Anesthesia: General with Block    Staff:   Circulator: Elizabeth Petersen RN  Scrub Person: Kaylah Mason  Vendor Representative: Nj Renee  Assistant: Jakub Wright CSA  Orientee: Landon Gonzales RN  Assistant: Jakub Wright CSA      Estimated Blood Loss: 75 mL    Urine Voided: * No values recorded between 1/14/2021  9:27 AM and 1/14/2021 11:09 AM *    Specimens:                None          Drains: * No LDAs found *    Findings: See dictation    Complications: None    Assistant: Jakub Wright CSA was responsible for performing the following activities: Retraction, suctioning and irrigation, wound closure and application of the postoperative bandages and their skilled assistance was necessary for the success of this case.    Jayden Holbrook MD     Date: 1/14/2021  Time: 11:09 EST

## 2021-01-14 NOTE — PLAN OF CARE
Problem: Adult Inpatient Plan of Care  Goal: Plan of Care Review  Outcome: Ongoing, Progressing  Flowsheets (Taken 1/14/2021 5075)  Progress: improving  Plan of Care Reviewed With: patient  Outcome Summary: Pt vitals stable. No c/o pain. Tolerated diet. Ambulating in daigle. Sling in place. Encourage IS use. Will continue to monitor   Goal Outcome Evaluation:  Plan of Care Reviewed With: patient  Progress: improving  Outcome Summary: Pt vitals stable. No c/o pain. Tolerated diet. Ambulating in daigle. Sling in place. Encourage IS use. Will continue to monitor

## 2021-01-14 NOTE — OP NOTE
Orthopaedic Operative Note    Facility: [facility]    Patient: Josue Rothman    Medical Record Number: [id number]    YOB: 1935    Dictating Surgeon: Jayden Holbrook M.D.*    Primary Care Physician: Fredo Melvin MD    Date of Operation: 1/14/2021    Pre-Operative Diagnosis:  Right rotator cuff tear arthropathy    Post-Operative Diagnosis:  Right rotator cuff tear arthropathy    Procedure Performed:    Right reverse total shoulder arthroplasty      Surgeon: Jayden Holbrook MD     Assistant: Jakub Wright CSA was responsible for performing the following activities: Retraction, suctioning and irrigation, wound closure and application of the postoperative bandages and their skilled assistance was necessary for the success of this case.    Anesthesia: Regional followed by Gen.    Complications: None.     Estimated Blood Loss: Less than 50 mL.     Implants: 1.  Biomet size 16 mini comprehensive stem  2.  Large augment mini glenoid baseplate with one 6.5 mm central compression screw and 4 peripheral 4.75 mm locking screws  3.  Size 40 mm +3 offset glenosphere  4.  Standard thickness, standard offset tray with standard thickness humeral bearing    Specimens: * No orders in the log *    Brief Operative Indication: Mr. Schulz had a history of worsening shoulder pain and dysfunction which had been nonresponsive to conservative treatment.  We had a thorough discussion regarding the risks, benefits and alternatives to an arthroplasty and non-surgical management versus surgery.  I explained that surgical risks include infection, hematoma, hardware related complications including failure of fixation, loosening, fracture, persistent pain and/or loss of motion, iatrogenic nerve and/or blood vessel injury resulting in permanent weakness, numbness or dysfunction, DVT, PE, positioning related neuropraxia, and anesthesia related complications resulting in death.  We discussed  the indication for a reverse as opposed to a standard total shoulder and the risks inherent to the reverse including notching, glenoid loosening, instability, and traction related neuropraxia, any of which could result in persistent pain or problems requiring further surgery.  Last, we discussed the rehab and all that will be expected of the patient post operatively to ensure an optimal outcome.  The patient voiced understanding of the risks, benefits, and alternative forms of treatment that were discussed and the patient consented to proceed.    Description of the procedure in detail:  The patient and operative site were identified in the preoperative holding area.  The surgical site was marked.  Adequate regional anesthesia was administered.  The patient was then taken to the operating room.  The patient was placed on the operating table where adequate general anesthesia was administered.  The patient was then repositioned into the modified beachchair position with the head and neck in neutral alignment.  All bony prominences were carefully padded and protected.    The right upper extremity was then prepped and draped in the standard, sterile fashion.  The extremity was cleaned with an alcohol solution.  A Hibiclens scrub was performed and then the extremity was prepped with 2 ChloraPrep preps.  I allowed this to dry for 3 minutes before the draping procedure was carried out.    A timeout was taken and preoperative antibiotics administered.  Transexamic acid was administered at this time as well.  Following this, I began by fashioning an approximately 6 cm incision over the anterior aspect of the shoulder.  This was carried down through the skin and subcutaneous tissues.  Full-thickness medial and lateral skin flaps were developed.  The interval between the deltoid and pectoralis was carefully identified and developed.  The underlying cephalic vein was dissected out and retracted laterally.  This structure was  kept protected throughout the case.    The clavipectoral fascia was divided.  The strap muscles were retracted medially.  The biceps groove was identified.  The biceps tendon was already torn and retracted.  As expected, there was a large, retracted tear of the rotator cuff involving the entirety of the supraspinatus, infraspinatus as well as the upper subscapularis.  There was significant arthrosis of the visible portion of the humeral head.  The subscapularis was carefully tagged and released.  I left a small cuff of tissue on the lesser tuberosity for a later anatomic repair of this structure.   The humeral head was then completely exposed.  The periarticular osteophytes were carefully removed with a rongeur.    The cutting guide for the head cut was inserted.  This was set to 20° of retroversion which I judged off of the forearm.  With the guide in position, I demarcated the cut and then carried out the cut using an oscillating saw.  The cut portion of the bone was removed and taken to the back table for possible bone grafting later in the case, if needed.    Having completed the humeral sided preparations, I then directed my attention to the glenoid.  The axillary nerve was carefully dissected out and identified.  This structure was protected.  Retractors were carefully positioned along the anterior, posterior and inferior glenoid rim.  I carefully exposed the caudal rim of the glenoid using the electrocautery.  The anterior, inferior and posterior glenoid labrum were then carefully debrided and removed along with the remaining biceps stump superiorly.  There was significant superior wear of the glenoid.  I determined the placement of an augmented baseplate was indicated.  The augmented centering guide for the baseplate was inserted.  The center pin for the baseplate was drilled in the center of the glenoid vault.  I then carefully reamed and prepared the glenoid in typical fashion, taking care to maintain  appropriate inferior tilt for proper positioning of the baseplate.    Once I had completed the reaming process and made sure that the reaming was adequate, I trialed with multiple size augments.  The large augment was deemed necessary in this case.  The augmented baseplate was impacted into position, taking care to maintain appropriate orientation.  This was secured with a single screw central compression screw which got great purchase.  The 4 peripheral locking screws were then placed without complication.  All 4 screws were confirmed to lock into the baseplate.  With the baseplate secured, I examined the remaining glenoid.  I did determine that a 40 mm +3 offset glenosphere would fit best in this case.  The appropriate size implant was selected for use and then impacted.  I took care to make sure that the Smith taper was fully engaged and that the implant was well-seated.  I used a right angle clamp to pass this beneath the implant and pull up.  When doing so, the entire scapula moved.  Once I was satisfied that this was well seated, I then directed my attention back to the humerus.    The humeral sided preparations were carried out in the typical fashion.  I reamed and broached the humerus up to a 16 which seemed to fit well.  The appropriate size implant was impacted into position, taking care to maintain appropriate retroversion as judged off of the forearm.  The implants seated well.  I then trialed off of the stem.  The standard thickness, standard offset trial tray with a standard thickness trial liner seemed to fit best.  This allowed for excellent motion and stability.  The trial component was removed and then the final component impacted.  Again, I took care to make sure that the Smith taper was fully engaged before reducing it and carrying the shoulder through range of motion.    Again, the shoulder demonstrated excellent motion and stability.  I could fully elevate, abduct and externally rotate the  shoulder without any impingement or limitation.  The shoulder demonstrated excellent motion and absolutely no instability.  There was good tension in the periarticular soft tissue structures.  At this point, I directed my attention to the subscapularis repair.  The arm was placed in approximately 30 degrees of external rotation.  The subscapularis was then anatomically repaired using multiple #2 max braid sutures.  I then irrigated the wound with 500 cc of a Betadine-containing saline solution.  I then irrigated with 3 L of sterile saline via pulsatile lavage.  I made sure that we had good hemostasis.  The wound was sequentially closed in a layered fashion.  Vicryl was used to repair the subcutaneous tissues.  A running subcuticular Monocryl stitch was used to close the skin followed by Dermabond.  Sterile dressings were applied.  The drapes were withdrawn.  The arm was placed in a sling.  The patient was awakened and taken to the recovery room in good condition.    Jayden Holbrook MD  01/14/21

## 2021-01-15 VITALS
TEMPERATURE: 97.8 F | WEIGHT: 181.44 LBS | DIASTOLIC BLOOD PRESSURE: 51 MMHG | HEIGHT: 69 IN | HEART RATE: 78 BPM | BODY MASS INDEX: 26.87 KG/M2 | SYSTOLIC BLOOD PRESSURE: 101 MMHG | OXYGEN SATURATION: 91 % | RESPIRATION RATE: 18 BRPM

## 2021-01-15 LAB — QT INTERVAL: 437 MS

## 2021-01-15 PROCEDURE — 97165 OT EVAL LOW COMPLEX 30 MIN: CPT

## 2021-01-15 PROCEDURE — 97535 SELF CARE MNGMENT TRAINING: CPT

## 2021-01-15 PROCEDURE — 25010000003 CEFAZOLIN IN DEXTROSE 2-4 GM/100ML-% SOLUTION: Performed by: ORTHOPAEDIC SURGERY

## 2021-01-15 PROCEDURE — 93010 ELECTROCARDIOGRAM REPORT: CPT | Performed by: INTERNAL MEDICINE

## 2021-01-15 PROCEDURE — 93005 ELECTROCARDIOGRAM TRACING: CPT | Performed by: INTERNAL MEDICINE

## 2021-01-15 PROCEDURE — 99221 1ST HOSP IP/OBS SF/LOW 40: CPT | Performed by: INTERNAL MEDICINE

## 2021-01-15 RX ORDER — AMOXICILLIN 250 MG
2 CAPSULE ORAL 2 TIMES DAILY
Qty: 60 TABLET | Refills: 0 | Status: SHIPPED | OUTPATIENT
Start: 2021-01-15

## 2021-01-15 RX ORDER — POLYETHYLENE GLYCOL 3350 17 G/17G
17 POWDER, FOR SOLUTION ORAL DAILY
Start: 2021-01-16 | End: 2021-01-20

## 2021-01-15 RX ORDER — ONDANSETRON 4 MG/1
4 TABLET, FILM COATED ORAL EVERY 6 HOURS PRN
Qty: 12 TABLET | Refills: 0 | Status: SHIPPED | OUTPATIENT
Start: 2021-01-15 | End: 2021-02-01

## 2021-01-15 RX ORDER — HYDROCODONE BITARTRATE AND ACETAMINOPHEN 7.5; 325 MG/1; MG/1
TABLET ORAL
Qty: 42 TABLET | Refills: 0
Start: 2021-01-15 | End: 2021-01-20

## 2021-01-15 RX ADMIN — SODIUM CHLORIDE, PRESERVATIVE FREE 3 ML: 5 INJECTION INTRAVENOUS at 08:52

## 2021-01-15 RX ADMIN — CEFAZOLIN SODIUM 2 G: 2 INJECTION, SOLUTION INTRAVENOUS at 02:27

## 2021-01-15 RX ADMIN — ACETAMINOPHEN 650 MG: 325 TABLET, FILM COATED ORAL at 11:35

## 2021-01-15 RX ADMIN — TAMSULOSIN HYDROCHLORIDE 0.4 MG: 0.4 CAPSULE ORAL at 08:52

## 2021-01-15 RX ADMIN — HYDROCODONE BITARTRATE AND ACETAMINOPHEN 1 TABLET: 7.5; 325 TABLET ORAL at 09:02

## 2021-01-15 RX ADMIN — POLYETHYLENE GLYCOL 3350 17 G: 17 POWDER, FOR SOLUTION ORAL at 08:52

## 2021-01-15 RX ADMIN — SODIUM CHLORIDE, POTASSIUM CHLORIDE, SODIUM LACTATE AND CALCIUM CHLORIDE 100 ML/HR: 600; 310; 30; 20 INJECTION, SOLUTION INTRAVENOUS at 14:16

## 2021-01-15 RX ADMIN — DOCUSATE SODIUM 50MG AND SENNOSIDES 8.6MG 2 TABLET: 8.6; 5 TABLET, FILM COATED ORAL at 08:51

## 2021-01-15 RX ADMIN — MUPIROCIN 1 APPLICATION: 20 OINTMENT TOPICAL at 14:17

## 2021-01-15 RX ADMIN — FINASTERIDE 5 MG: 5 TABLET, FILM COATED ORAL at 08:52

## 2021-01-15 RX ADMIN — MELOXICAM 15 MG: 15 TABLET ORAL at 08:52

## 2021-01-15 RX ADMIN — CELECOXIB 200 MG: 200 CAPSULE ORAL at 08:52

## 2021-01-15 NOTE — PLAN OF CARE
Goal Outcome Evaluation:  Plan of Care Reviewed With: patient  Progress: improving  Outcome Summary: OT evaluation completed on this date. Pt is an 85 year old male admitted to the hospital for R shoulder pain. Pt s/p R reverse total shoulder arthroplasty. Pt previously independent with adls and functional transfers with no AD. On this date, pt appears to be near baseline requiring supervision for safety with adls and functional transfers. Pt to receive outpatient therapy services upon dc. No skilled OT services warranted at this time, as patient is familiar with shoulder protocol due to previous replacement. Pt able to perform shoulder exercises with supervision.    Pt wore face mask during this therapy encounter. Therapist wore appropriate PPE including face mask, gloves, and eye protection. Hand hygiene completed before and after therapy session. Pt not in enhanced precautions.

## 2021-01-15 NOTE — THERAPY EVALUATION
Patient Name: Josue Rothman  : 1935    MRN: [id number]                              Today's Date: 1/15/2021       Admit Date: 2021    Visit Dx:     ICD-10-CM ICD-9-CM   1. Shoulder arthritis  M19.019 716.91     Patient Active Problem List   Diagnosis   • Shoulder arthritis   • Status post reverse total replacement of left shoulder     Past Medical History:   Diagnosis Date   • History of kidney stones    • History of pneumonia    • History of transfusion     no reaction   • Hyperlipidemia    • Limited range of motion (ROM) of shoulder     right   • Osteoarthritis    • Paroxysmal atrial fibrillation (CMS/HCC)      Past Surgical History:   Procedure Laterality Date   • BACK SURGERY      RODS & SCREWS   • CATARACT EXTRACTION EXTRACAPSULAR W/ INTRAOCULAR LENS IMPLANTATION Bilateral    • COLONOSCOPY     • HERNIA REPAIR Bilateral     2x on left inguinal & 1 time on right inguinal   • KIDNEY STONE SURGERY     • TOTAL SHOULDER ARTHROPLASTY W/ DISTAL CLAVICLE EXCISION Left 9/10/2020    Procedure: TOTAL SHOULDER REVERSE ARTHROPLASTY;  Surgeon: Jayden Holbrook MD;  Location: [facility];  Service: Orthopedics;  Laterality: Left;   • TOTAL SHOULDER ARTHROPLASTY W/ DISTAL CLAVICLE EXCISION Right 2021    Procedure: Right Reverse Total Shoulder Arthroplasty;  Surgeon: Jayden Holbrook MD;  Location: [facility];  Service: Orthopedics;  Laterality: Right;     General Information     Row Name 01/15/21 1501          OT Time and Intention    Document Type  discharge evaluation/summary  -BT     Mode of Treatment  individual therapy;occupational therapy  -BT     Row Name 01/15/21 1506          General Information    Patient Profile Reviewed  yes  -BT     Prior Level of Function  independent:  -BT     Existing Precautions/Restrictions  non-weight bearing;right;shoulder;brace worn when out of bed  -BT     Barriers to Rehab  none identified  -BT     Row Name 01/15/21 9202          Living  Environment    Lives With  spouse  -BT     Row Name 01/15/21 1503          Cognition    Orientation Status (Cognition)  oriented x 4  -BT     Row Name 01/15/21 1503          Safety Issues, Functional Mobility    Safety Issues Affecting Function (Mobility)  impulsivity  -BT     Impairments Affecting Function (Mobility)  range of motion (ROM)  -BT       User Key  (r) = Recorded By, (t) = Taken By, (c) = Cosigned By    Initials Name Provider Type    BT Rhoda Lance OT Occupational Therapist          Mobility/ADL's     Row Name 01/15/21 1504          Bed Mobility    Comment (Bed Mobility)  pt sitting at EOB upon arrival  -BT     Row Name 01/15/21 1504          Transfers    Transfers  sit-stand transfer  -BT     Sit-Stand Thurman (Transfers)  supervision  -BT     Row Name 01/15/21 1504          Sit-Stand Transfer    Assistive Device (Sit-Stand Transfers)  -- No AD  -BT     Row Name 01/15/21 1504          Activities of Daily Living    BADL Assessment/Intervention  lower body dressing  -BT     Row Name 01/15/21 1504          Mobility    Extremity Weight-bearing Status  right upper extremity  -BT     Right Upper Extremity (Weight-bearing Status)  non weight-bearing (NWB)  -BT     Row Name 01/15/21 1504          Lower Body Dressing Assessment/Training    Thurman Level (Lower Body Dressing)  lower body dressing skills;doff;don;socks;supervision  -BT     Position (Lower Body Dressing)  edge of bed sitting one arm method  -BT       User Key  (r) = Recorded By, (t) = Taken By, (c) = Cosigned By    Initials Name Provider Type    BT Rhoda Lance OT Occupational Therapist        Obj/Interventions     Row Name 01/15/21 1509          Range of Motion Comprehensive    Comment, General Range of Motion  L BUE AROM WFL, R AROM limited due to shoulder precautions  -BT     Row Name 01/15/21 1509          Strength Comprehensive (MMT)    Comment, General Manual Muscle Testing (MMT) Assessment  BUE strength WFL, with exception to  L R affected shoulder  -BT     Row Name 01/15/21 1509          Shoulder (Therapeutic Exercise)    Shoulder (Therapeutic Exercise)  pendulum exercises  -BT     Shoulder AROM (Therapeutic Exercise)  -- pendulums  -BT     Shoulder Pendulum Exercises (Therapeutic Exercise)  right;standing  -BT     Row Name 01/15/21 1509          Elbow/Forearm (Therapeutic Exercise)    Elbow/Forearm (Therapeutic Exercise)  AROM (active range of motion)  -BT     Elbow/Forearm AROM (Therapeutic Exercise)  right;flexion;extension  -BT     Row Name 01/15/21 1509          Wrist (Therapeutic Exercise)    Wrist (Therapeutic Exercise)  AROM (active range of motion)  -BT     Wrist AROM (Therapeutic Exercise)  flexion;extension  -BT     Row Name 01/15/21 1509          Hand (Therapeutic Exercise)    Hand (Therapeutic Exercise)  AROM (active range of motion)  -BT     Hand AROM/AAROM (Therapeutic Exercise)  right;finger flexion;finger extension  -BT     Row Name 01/15/21 1509          Therapeutic Exercise    Therapeutic Exercise  shoulder;elbow/forearm;wrist;hand  -BT       User Key  (r) = Recorded By, (t) = Taken By, (c) = Cosigned By    Initials Name Provider Type    BT Rhoda Lance OT Occupational Therapist        Goals/Plan     Row Name 01/15/21 1518          ROM Goal 1 (OT)    ROM Goal 1 (OT)  Pt to demo shoulder protocol HEP with supervision.  -BT     Time Frame (ROM Goal 1, OT)  short term goal (STG);1 day  -BT     Progress/Outcome (ROM Goal 1, OT)  goal met  -BT       User Key  (r) = Recorded By, (t) = Taken By, (c) = Cosigned By    Initials Name Provider Type    BT Rhoda Lance OT Occupational Therapist        Clinical Impression     Row Name 01/15/21 1513          Pain Assessment    Additional Documentation  Pain Scale: Numbers Pre/Post-Treatment (Group)  -BT     Row Name 01/15/21 1515          Pain Scale: Numbers Pre/Post-Treatment    Pretreatment Pain Rating  0/10 - no pain  -BT     Posttreatment Pain Rating  0/10 - no pain  -BT      Pain Intervention(s)  Repositioned;Rest  -BT     Row Name 01/15/21 1515          Plan of Care Review    Plan of Care Reviewed With  patient  -BT     Progress  improving  -BT     Outcome Summary  OT evaluation completed on this date. Pt is an 85 year old male admitted to the hospital for R shoulder pain. Pt s/p R reverse total shoulder arthroplasty. Pt previously independent with adls and functional transfers with no AD. On this date, pt appears to be near baseline requiring supervision for safety with adls and functional transfers. Pt to receive outpatient therapy services upon dc. No skilled OT services warranted at this time, as patient is familiar with shoulder protocol due to previous replacement. Pt able to perform shoulder exercises with supervision.  -BT     Row Name 01/15/21 1515          Therapy Assessment/Plan (OT)    Criteria for Skilled Therapeutic Interventions Met (OT)  no  -BT     Therapy Frequency (OT)  evaluation only  -BT     Row Name 01/15/21 1515          Therapy Plan Review/Discharge Plan (OT)    Anticipated Discharge Disposition (OT)  home with outpatient therapy services  -BT     Row Name 01/15/21 1515          Positioning and Restraints    Pre-Treatment Position  in bed  -BT     Post Treatment Position  bed  -BT     In Bed  sitting EOB;call light within reach;encouraged to call for assist  -BT       User Key  (r) = Recorded By, (t) = Taken By, (c) = Cosigned By    Initials Name Provider Type    Rhoda Miller OT Occupational Therapist        Outcome Measures     Row Name 01/15/21 1516          How much help from another is currently needed...    Putting on and taking off regular lower body clothing?  3  -BT     Bathing (including washing, rinsing, and drying)  3  -BT     Toileting (which includes using toilet bed pan or urinal)  3  -BT     Putting on and taking off regular upper body clothing  3  -BT     Taking care of personal grooming (such as brushing teeth)  3  -BT     Eating meals  3   -BT     AM-PAC 6 Clicks Score (OT)  18  -BT     Row Name 01/15/21 1519          Functional Assessment    Outcome Measure Options  AM-PAC 6 Clicks Daily Activity (OT)  -BT       User Key  (r) = Recorded By, (t) = Taken By, (c) = Cosigned By    Initials Name Provider Type    BT Rhoda Lance OT Occupational Therapist        Occupational Therapy Education                 Title: PT OT SLP Therapies (Done)     Topic: Occupational Therapy (Done)     Point: ADL training (Done)     Description:   Instruct learner(s) on proper safety adaptation and remediation techniques during self care or transfers.   Instruct in proper use of assistive devices.              Learning Progress Summary           Patient Acceptance, E, VU,DU by BT at 1/15/2021 1519    Comment: Purpose of OT, adl retraining utilzing kermit technique, shoulder protocol HEP.                   Point: Home exercise program (Done)     Description:   Instruct learner(s) on appropriate technique for monitoring, assisting and/or progressing therapeutic exercises/activities.              Learning Progress Summary           Patient Acceptance, E, VU,DU by BT at 1/15/2021 1519    Comment: Purpose of OT, adl retraining utilzing kermit technique, shoulder protocol HEP.                   Point: Precautions (Done)     Description:   Instruct learner(s) on prescribed precautions during self-care and functional transfers.              Learning Progress Summary           Patient Acceptance, E, VU,DU by BT at 1/15/2021 1519    Comment: Purpose of OT, adl retraining utilzing kermit technique, shoulder protocol HEP.                   Point: Body mechanics (Done)     Description:   Instruct learner(s) on proper positioning and spine alignment during self-care, functional mobility activities and/or exercises.              Learning Progress Summary           Patient Acceptance, E, VU,DU by BT at 1/15/2021 1519    Comment: Purpose of OT, adl retraining utilzing kermit technique, shoulder  protocol HEP.                               User Key     Initials Effective Dates Name Provider Type Discipline    BT 11/16/20 -  Rhoda Lance OT Occupational Therapist OT              OT Recommendation and Plan  Therapy Frequency (OT): evaluation only  Plan of Care Review  Plan of Care Reviewed With: patient  Progress: improving  Outcome Summary: OT evaluation completed on this date. Pt is an 85 year old male admitted to the hospital for R shoulder pain. Pt s/p R reverse total shoulder arthroplasty. Pt previously independent with adls and functional transfers with no AD. On this date, pt appears to be near baseline requiring supervision for safety with adls and functional transfers. Pt to receive outpatient therapy services upon dc. No skilled OT services warranted at this time, as patient is familiar with shoulder protocol due to previous replacement. Pt able to perform shoulder exercises with supervision.     Time Calculation:   Time Calculation- OT     Row Name 01/15/21 1521             Time Calculation- OT    OT Start Time  1410  -BT      OT Stop Time  1432  -BT      OT Time Calculation (min)  22 min  -BT      Total Timed Code Minutes- OT  17 minute(s)  -BT      OT Received On  01/15/21  -BT        User Key  (r) = Recorded By, (t) = Taken By, (c) = Cosigned By    Initials Name Provider Type    BT Rhoda Lance OT Occupational Therapist        Therapy Charges for Today     Code Description Service Date Service Provider Modifiers Qty    [id number]  OT EVAL LOW COMPLEXITY 2 1/15/2021 Rhoda Lance OT GO 1    [id number]  OT SELF CARE/MGMT/TRAIN EA 15 MIN 1/15/2021 Rhoda Lance OT GO 1               Rhoda Lance OT  1/15/2021

## 2021-01-15 NOTE — CONSULTS
Date of Hospital Visit: 01/15/21  Encounter Provider: Navjot Maldonado MD  Place of Service: [facility]  Patient Name: Josue Rothman  :1935  Referral Provider: Jayden Holbrook MD    Chief complaint: right shoulder pain    Consulted for: PAF    History of Present Illness    Mr Josue Rothman is a a very pleasant and spry 85 year old man who follows with Dr Burnett for his cardiac care.  He has a history of PAF and underwent cardioversion in [place] in the past.  His AF is highly symptomatic.  He has been maintained on diltiazem and apixaban.    He underwent left shoulder replacement a few months ago and right shoulder replacement yesterday.  His nurse was concerned he was in atrial fibrillation.  Review of telemetry showed SR with PACs, and this was confirmed on EKG.  He denies any palpitations, CP, SOA, lightheadedness, or leg swelling, and is eager to go home today.     Past Medical History:   Diagnosis Date   • History of kidney stones    • History of pneumonia    • History of transfusion     no reaction   • Hyperlipidemia    • Limited range of motion (ROM) of shoulder     right   • Osteoarthritis    • Paroxysmal atrial fibrillation (CMS/HCC)        Past Surgical History:   Procedure Laterality Date   • BACK SURGERY      RODS & SCREWS   • CATARACT EXTRACTION EXTRACAPSULAR W/ INTRAOCULAR LENS IMPLANTATION Bilateral    • COLONOSCOPY     • HERNIA REPAIR Bilateral     2x on left inguinal & 1 time on right inguinal   • KIDNEY STONE SURGERY     • TOTAL SHOULDER ARTHROPLASTY W/ DISTAL CLAVICLE EXCISION Left 9/10/2020    Procedure: TOTAL SHOULDER REVERSE ARTHROPLASTY;  Surgeon: Jayden Holbrook MD;  Location: [facility];  Service: Orthopedics;  Laterality: Left;   • TOTAL SHOULDER ARTHROPLASTY W/ DISTAL CLAVICLE EXCISION Right 2021    Procedure: Right Reverse Total Shoulder Arthroplasty;  Surgeon: Jayden Holrbook MD;  Location: [facility];   Service: Orthopedics;  Laterality: Right;       Prior to Admission medications    Medication Sig Start Date End Date Taking? Authorizing Provider   Chlorhexidine Gluconate Cloth 2 % pads Apply 1 application topically. USE AS DIRECTED PREOP   Yes Rica Yu MD   mupirocin (BACTROBAN) 2 % nasal ointment 1 application into the nostril(s) as directed by provider. USE AS DIRECTED PREOP   Yes Rica Yu MD   aspirin 81 MG EC tablet Take 81 mg by mouth Every 3 (Three) Days. Pt to stop before surgery    Rica Yu MD   atorvastatin (LIPITOR) 40 MG tablet Take 20 mg by mouth Every Night. 9/28/19   Emergency, Nurse Epic, RN   celecoxib (CeleBREX) 200 MG capsule Take 200 mg by mouth Daily. 9/28/19   Emergency, Nurse Servando RN   Cod Liver Oil capsule Take 1 capsule by mouth Daily. HOLD PRIOR TO SURGERY    Nurse Servando Benavides RN   coenzyme Q10 100 MG capsule Take 100 mg by mouth Daily.    Rica Yu MD   dilTIAZem XR (Dilt-XR) 120 MG 24 hr capsule Take 120 mg by mouth Every Morning. 5/18/20   Rica Yu MD   ELIQUIS 5 MG tablet tablet Take 5 mg by mouth Every 12 (Twelve) Hours. HOLD PRIOR TO SURGERY 11/29/19   Makayla, Nurse Servando RN   finasteride (PROSCAR) 5 MG tablet Take 5 mg by mouth Daily. 11/15/18   Makayla, Nurse Servando RN   Fish Oil-Cholecalciferol (OMEGA-3 + D PO) Take 1 capsule by mouth Every Morning.    Rica Yu MD   gabapentin (NEURONTIN) 300 MG capsule Take 300 mg by mouth Every Night.    Rica Yu MD   Ginger, Zingiber officinalis, (GINGER ROOT PO) Take 550 mg by mouth Daily.    Rica Yu MD   Glucosamine HCl-MSM (GLUCOSAMINE-MSM PO) Take 1 capsule by mouth Every Morning.    Rica Yu MD   Naproxen Sod-diphenhydrAMINE (Aleve PM) 220-25 MG tablet Take 1 tablet by mouth Every Morning.    Rica Yu MD   naproxen sodium (ALEVE) 220 MG tablet Take 220 mg by mouth 2 (Two) Times a Day As Needed.     "Rica Yu MD   Pyridoxine HCl (B-6 PO) Take 100 mg by mouth Daily.    Rica Yu MD   tamsulosin (FLOMAX) 0.4 MG capsule 24 hr capsule Take 1 capsule by mouth Daily. 5/26/20   Rica Yu MD   Turmeric 400 MG capsule Take 400 mg by mouth Every Morning.    Rica Yu MD       Social History     Socioeconomic History   • Marital status: Single     Spouse name: Not on file   • Number of children: Not on file   • Years of education: Not on file   • Highest education level: Not on file   Tobacco Use   • Smoking status: Never Smoker   • Smokeless tobacco: Never Used   Substance and Sexual Activity   • Alcohol use: Yes     Alcohol/week: 3.0 standard drinks     Types: 1 Glasses of wine, 1 Cans of beer, 1 Shots of liquor per week   • Drug use: Never       Family History   Problem Relation Age of Onset   • Malig Hyperthermia Neg Hx        Review of Systems   Musculoskeletal: Positive for arthralgias.   All other systems reviewed and are negative.       Objective:     Vitals:    01/15/21 0728 01/15/21 0900 01/15/21 1032 01/15/21 1308   BP: 119/55 99/52 105/81 101/51   BP Location: Left arm   Left arm   Patient Position: Lying   Lying   Pulse: 83 95 86 78   Resp: 18   18   Temp: 97.9 °F (36.6 °C)   97.8 °F (36.6 °C)   TempSrc: Oral   Oral   SpO2: 94% 92% 91% 91%   Weight:       Height:         Body mass index is 26.79 kg/m².  Flowsheet Rows      First Filed Value   Admission Height  175.3 cm (69\") Documented at 01/14/2021 0741   Admission Weight  81.6 kg (180 lb) Documented at 01/14/2021 0741          Physical Exam  Vitals signs reviewed.   Constitutional:       Appearance: He is well-developed.   HENT:      Head: Normocephalic.      Nose: Nose normal.   Eyes:      Conjunctiva/sclera: Conjunctivae normal.   Neck:      Musculoskeletal: Normal range of motion.      Vascular: No JVD.   Cardiovascular:      Rate and Rhythm: Normal rate and regular rhythm. Frequent extrasystoles are " present.     Heart sounds: Normal heart sounds.   Pulmonary:      Effort: Pulmonary effort is normal.      Breath sounds: Normal breath sounds.   Abdominal:      Palpations: Abdomen is soft.      Tenderness: There is no abdominal tenderness.   Musculoskeletal:      Comments: RUE in a sling, some swelling around shoulder   Skin:     General: Skin is warm and dry.      Findings: No erythema.   Neurological:      Mental Status: He is alert and oriented to person, place, and time.      Cranial Nerves: No cranial nerve deficit.   Psychiatric:         Behavior: Behavior normal.         Thought Content: Thought content normal.         Judgment: Judgment normal.                 Lab Review:                                                        I personally viewed and interpreted the patient's EKG/Telemetry data -- SR, RBBB, PACs    Assessment/Plan:     Mr Rothman has a known history of PAF.  Currently he is in NSR with PACs.  They are asymptomatic.      Resume home medications, okay for discharge home from cardiac standpoint.  He will follow up with Dr Burnett.

## 2021-01-15 NOTE — PLAN OF CARE
POD 1 R shoulder, VSS, A&ox4, ERAS, no c/o pain, R arm sling on, IVF, IVF abx, d/c pending, ctm     Problem: Adult Inpatient Plan of Care  Goal: Plan of Care Review  Outcome: Ongoing, Progressing  Flowsheets  Taken 1/15/2021 0342 by Lynda Barnhart RN  Progress: improving  Taken 1/14/2021 1844 by Lilia Rincon RN  Plan of Care Reviewed With: patient  Goal: Patient-Specific Goal (Individualized)  Outcome: Ongoing, Progressing  Goal: Absence of Hospital-Acquired Illness or Injury  Outcome: Ongoing, Progressing  Intervention: Identify and Manage Fall Risk  Recent Flowsheet Documentation  Taken 1/15/2021 0200 by Lynda Barnhart RN  Safety Promotion/Fall Prevention: safety round/check completed  Taken 1/15/2021 0030 by Lynda Barnhart RN  Safety Promotion/Fall Prevention: safety round/check completed  Taken 1/14/2021 2200 by Lynda Barnhart RN  Safety Promotion/Fall Prevention: safety round/check completed  Taken 1/14/2021 2030 by Lynda Barnhart RN  Safety Promotion/Fall Prevention:   activity supervised   safety round/check completed  Intervention: Prevent Skin Injury  Recent Flowsheet Documentation  Taken 1/15/2021 0200 by Lynda Barnhart RN  Body Position: position changed independently  Taken 1/14/2021 2030 by Lynda Barnhart RN  Body Position: position changed independently  Intervention: Prevent and Manage VTE (venous thromboembolism) Risk  Recent Flowsheet Documentation  Taken 1/15/2021 0200 by Lynda Barnhart RN  VTE Prevention/Management:   bilateral   dorsiflexion/plantar flexion performed   sequential compression devices on  Taken 1/14/2021 2030 by Lynda Barnhart RN  VTE Prevention/Management:   bilateral   dorsiflexion/plantar flexion performed   sequential compression devices on  Intervention: Prevent Infection  Recent Flowsheet Documentation  Taken 1/15/2021 0200 by Lynda Barnhart RN  Infection Prevention: rest/sleep promoted  Taken 1/15/2021 0030 by Lynda Barnhart  RN  Infection Prevention: rest/sleep promoted  Taken 1/14/2021 2200 by Lynda Barnhart RN  Infection Prevention: rest/sleep promoted  Taken 1/14/2021 2030 by Lynda Barnhart RN  Infection Prevention: rest/sleep promoted  Goal: Optimal Comfort and Wellbeing  Outcome: Ongoing, Progressing  Intervention: Provide Person-Centered Care  Recent Flowsheet Documentation  Taken 1/15/2021 0200 by Lynda Barnhart RN  Trust Relationship/Rapport:   care explained   choices provided   emotional support provided  Taken 1/14/2021 2030 by Lynda Barnhart RN  Trust Relationship/Rapport:   care explained   choices provided   emotional support provided  Goal: Readiness for Transition of Care  Outcome: Ongoing, Progressing     Problem: Fall Injury Risk  Goal: Absence of Fall and Fall-Related Injury  Outcome: Ongoing, Progressing  Intervention: Identify and Manage Contributors to Fall Injury Risk  Recent Flowsheet Documentation  Taken 1/15/2021 0200 by Lynda Barnhart RN  Medication Review/Management: medications reviewed  Self-Care Promotion:   independence encouraged   BADL personal objects within reach   BADL personal routines maintained  Taken 1/14/2021 2030 by Lynda Barnhart RN  Self-Care Promotion:   independence encouraged   BADL personal objects within reach   BADL personal routines maintained  Intervention: Promote Injury-Free Environment  Recent Flowsheet Documentation  Taken 1/15/2021 0200 by Lynda Barnhart RN  Safety Promotion/Fall Prevention: safety round/check completed  Taken 1/15/2021 0030 by Lynda Barnhart RN  Safety Promotion/Fall Prevention: safety round/check completed  Taken 1/14/2021 2200 by Lynda Barnhart RN  Safety Promotion/Fall Prevention: safety round/check completed  Taken 1/14/2021 2030 by Lynda Barnhart RN  Safety Promotion/Fall Prevention:   activity supervised   safety round/check completed     Problem: Pain Acute  Goal: Optimal Pain Control  Outcome: Ongoing,  Progressing  Intervention: Optimize Psychosocial Wellbeing  Recent Flowsheet Documentation  Taken 1/15/2021 0200 by Lynda Barnhart, RN  Supportive Measures: active listening utilized  Diversional Activities: television  Taken 1/14/2021 2030 by Lynda Barnhart, RN  Supportive Measures: active listening utilized  Diversional Activities:   television   smartphone   Goal Outcome Evaluation:  Plan of Care Reviewed With: patient  Progress: improving

## 2021-01-15 NOTE — PROGRESS NOTES
Discharge Planning Assessment  [facility]     Patient Name: Josue Rothman  MRN: [id number]  Today's Date: 1/15/2021    Admit Date: 1/14/2021    Discharge Needs Assessment     Row Name 01/15/21 1202       Living Environment    Lives With  alone    Current Living Arrangements  home/apartment/condo    Primary Care Provided by  self    Provides Primary Care For  no one    Family Caregiver if Needed  friend(s);grandchild(liam), adult    Family Caregiver Names  friend Livia Lagos will be staying with pt at [place]. Pt confirmed his granddaughter Rosa as first ER Contact    Quality of Family Relationships  supportive    Able to Return to Prior Arrangements  yes       Resource/Environmental Concerns    Resource/Environmental Concerns  none    Transportation Concerns  car, none       Transition Planning    Patient/Family Anticipates Transition to  home friend Lviia to stay with him    Patient/Family Anticipated Services at Transition  rehabilitation services plans to go to OP PT at [facility]    Transportation Anticipated  family or friend will provide       Discharge Needs Assessment    Readmission Within the Last 30 Days  no previous admission in last 30 days    Current Outpatient/Agency/Support Group  -- none    Equipment Currently Used at Home  cane, straight;grab bar;sling    Concerns to be Addressed  denies needs/concerns at this time    Anticipated Changes Related to Illness  inability to care for self    Equipment Needed After Discharge  none    Outpatient/Agency/Support Group Needs  outpatient therapy    Discharge Facility/Level of Care Needs  other (see comments) Home with no needs    Provided Post Acute Provider List?  Refused    Refused Provider List Comment  Plans home and will do OP PT at [facility]    Current Discharge Risk  dependent with mobility/activities of daily living;physical impairment;lives alone        Discharge Plan     Row Name 01/15/21 1201       Plan    Plan  Home via private auto.  Friend Livia to stay and assist him as needed. Plans to do OP PT at [facility]    Patient/Family in Agreement with Plan  yes    Plan Comments  Introduced self/explained role of CCP. Face sheet data reviewed. IMM letter checked. Pt lives at home alone. He has a close friend Livia who is with him often. States he is IADLs and able to drive to  his meds or do shopping. States he occasionally uses a cane to ambulate d/t knee stiffness. States he used HH after back surgery in 2009 but does not remember the agency name. Denies previous SNF stay. Pt states he had his other shoulder done in Sept 2020 and he did OP PT at [facility]. He plans to do the same this time. Pt states his friend Livia will drive him home at [place] and pt denies any needs. CCP to follow..........JW        Continued Care and Services - Admitted Since 1/14/2021    Coordination has not been started for this encounter.         Demographic Summary     Row Name 01/15/21 1202       General Information    Admission Type  inpatient    Arrived From  home    Required Notices Provided  Important Message from Medicare    Referral Source  admission list    Reason for Consult  discharge planning    Preferred Language  English     Used During This Interaction  no       Contact Information    Permission Granted to Share Info With          Functional Status     Row Name 01/15/21 1202       Functional Status    Usual Activity Tolerance  good    Current Activity Tolerance  fair       Functional Status, IADL    Medications  independent    Meal Preparation  independent    Housekeeping  independent    Laundry  independent    Shopping  independent        Psychosocial    No documentation.       Abuse/Neglect    No documentation.       Legal    No documentation.       Substance Abuse    No documentation.       Patient Forms    No documentation.           Leanne Miller RN

## 2021-01-16 NOTE — PROGRESS NOTES
Case Management Discharge Note      Final Note: Home via private auto 1/15/21    Provided Post Acute Provider List?: Refused  Refused Provider List Comment: Plans home and will do OP PT at [facility]    Selected Continued Care - Discharged on 1/15/2021 Admission date: 1/14/2021 - Discharge disposition: Home or Self Care    Destination    No services have been selected for the patient.              Durable Medical Equipment    No services have been selected for the patient.              Dialysis/Infusion    No services have been selected for the patient.              Home Medical Care    No services have been selected for the patient.              Therapy    No services have been selected for the patient.              Community Resources    No services have been selected for the patient.                  Transportation Services  Private: Car    Final Discharge Disposition Code: 01 - home or self-care

## 2021-01-18 NOTE — DISCHARGE SUMMARY
Date of Admission:  1/14/2021    Date of Discharge:  1/15/2021    Discharge Diagnosis: s/p Right reverse total shoulder arthroplasty    Admitting Physician: Jayden Holbrook    Consults: none    DETAILS OF HOSPITAL STAY:  Patient is a 85 y.o. male was admitted to the floor following a reverse total shoulder arthroplasty.  Post-operatively the patient was transferred to the post-operative floor where the patient underwent physical therapy including both active and passive ROM exercises. Opioids were titrated to achieve appropriate pain management to allow for participation in mobilization exercises. Vital signs are now stable. The incision is benign without signs or symptoms of infection. Operative extremity neurovascular status remains intact. Appropriate education re: incision care, activity levels, medications, and follow up visits was completed and all questions were answered. The patient is now deemed stable for discharge to home.    Condition on Discharge:  Stable    Discharge Medications     Discharge Medications      New Medications      Instructions Start Date   HYDROcodone-acetaminophen 7.5-325 MG per tablet  Commonly known as: NORCO   Take 1-2 tabs po Q 4-6 hours prn pain.  Not to exceed 6 tabs per day.      ondansetron 4 MG tablet  Commonly known as: ZOFRAN   4 mg, Oral, Every 6 Hours PRN      polyethylene glycol 17 g packet  Commonly known as: MIRALAX   17 g, Oral, Daily      Senexon-S 8.6-50 MG per tablet  Generic drug: sennosides-docusate   2 tablets, Oral, 2 Times Daily         Continue These Medications      Instructions Start Date   aspirin 81 MG EC tablet   81 mg, Oral, Every 3 Days, Pt to stop before surgery      atorvastatin 40 MG tablet  Commonly known as: LIPITOR   20 mg, Oral, Nightly      B-6 PO   100 mg, Oral, Daily      celecoxib 200 MG capsule  Commonly known as: CeleBREX   200 mg, Oral, Daily      Cod Liver Oil capsule   1 capsule, Oral, Daily, HOLD PRIOR TO SURGERY      coenzyme Q10 100 MG  capsule   100 mg, Oral, Daily      Dilt- MG 24 hr capsule  Generic drug: dilTIAZem XR   120 mg, Oral, Every Morning      Eliquis 5 MG tablet tablet  Generic drug: apixaban   5 mg, Oral, Every 12 Hours Scheduled, HOLD PRIOR TO SURGERY      finasteride 5 MG tablet  Commonly known as: PROSCAR   5 mg, Oral, Daily      gabapentin 300 MG capsule  Commonly known as: NEURONTIN   300 mg, Oral, Nightly      tamsulosin 0.4 MG capsule 24 hr capsule  Commonly known as: FLOMAX   1 capsule, Oral, Daily         Stop These Medications    Aleve -25 MG tablet  Generic drug: Naproxen Sod-diphenhydrAMINE     Chlorhexidine Gluconate Cloth 2 % pads     GENEVA ROOT PO     GLUCOSAMINE-MSM PO     mupirocin 2 % nasal ointment  Commonly known as: BACTROBAN     naproxen sodium 220 MG tablet  Commonly known as: ALEVE     OMEGA-3 + D PO     Turmeric 400 MG capsule            Discharge Diet: regular    Activity at Discharge: as tolerated    Discharge Instructions:   1)  Patient is to continue with physical therapy exercises daily and continue working with the physical therapist as ordered.  2)  Continue to follow precautions as instructed.   3)  Patient is instructed to continue use of the sling except when performing their physical therapy exercising and while dressing or showering.  4)  Continue to ice regularly. Patient also instructed on incentive spirometer during hospitalization and encouraged to continue to use at home regularly.   5)  The dressing should be left in place. If waterproof dressing is intact the patient may shower immediately following discharge. If the dressing becomes disloged or saturated it should be changed and patient must wait until POD #5 to shower. If dressing is changed, apply dry sterile dressing after showering.  6)  Follow up appointment in 2 weeks - patient to call the office at [phone] to schedule.     Follow-up Appointments  2 weeks with Dr Yusef Perez, APRN  01/18/21  09:03 EST

## 2021-01-19 ENCOUNTER — TELEPHONE (OUTPATIENT)
Dept: ORTHOPEDIC SURGERY | Facility: CLINIC | Age: 86
End: 2021-01-19

## 2021-01-19 NOTE — TELEPHONE ENCOUNTER
Call returned to the patient.  He apparently laid down at 1:00 this afternoon to take a nap and woke up to what he described as a wet feeling.  When he got up to go to the bathroom he noticed that his T-shirt was saturated in blood over the area of his shoulder incision.  The dressing is saturated with blood he does have a moderate amount of swelling and bruising and is on Eliquis.  I was going to have him change the bandage and apply a pressure dressing however the patient apparently had his significant other go ahead and just drive him over to the office.  See the patient for wound check

## 2021-01-19 NOTE — TELEPHONE ENCOUNTER
Right shoulder dressing has been removed.  The incision itself is intact with no obvious bloody drainage.  Does have a moderate amount of swelling and ecchymosis to that shoulder and anterior chest area.  The Telfa dressing was completely saturated with old blood.  Have applied a bulky pressure dressing to the incision.  Have instructed the patient to leave the dressing alone and only change it if it becomes saturated.  I will check with [facility] to see if he needs to be seen tomorrow in the office for further evaluation and then get back with the patient

## 2021-01-19 NOTE — TELEPHONE ENCOUNTER
PATIENT CALLED AND HE WAS TAKING A NAP WHEN HE WOKE UP HE SAID HE WAS BLEEDING FROM HIS INCISION. THE BLOOD IS RED AND HE SAID IT WAS QUIET A BIT. I SPOKE TO JOCY AND SHE SAID TO GIVE IT TO JOHN TO CALL ABOUT.

## 2021-01-20 ENCOUNTER — OFFICE VISIT (OUTPATIENT)
Dept: ORTHOPEDIC SURGERY | Facility: CLINIC | Age: 86
End: 2021-01-20

## 2021-01-20 VITALS — HEIGHT: 69 IN | BODY MASS INDEX: 26.81 KG/M2 | TEMPERATURE: 97.8 F | WEIGHT: 181 LBS

## 2021-01-20 DIAGNOSIS — Z09 SURGERY FOLLOW-UP: Primary | ICD-10-CM

## 2021-01-20 PROCEDURE — 99024 POSTOP FOLLOW-UP VISIT: CPT | Performed by: NURSE PRACTITIONER

## 2021-01-20 RX ORDER — CEPHALEXIN 500 MG/1
500 CAPSULE ORAL EVERY 12 HOURS
Qty: 14 CAPSULE | Refills: 0 | Status: SHIPPED | OUTPATIENT
Start: 2021-01-20 | End: 2021-02-01

## 2021-01-25 NOTE — PROGRESS NOTES
Chief Complaint:  Right shoulder follow up, wound check    HPI:  Mr. Rothman is 1 weeks s/p right reverse total shoulder arthroplasty.  Reports he noticed his surgical wound had bloody discharge after taking a nap.  He reported to our office and had the wound redressed.  He is here today for further evaluation and recommendations.     Exam:  Incision is approximated.  No purulent drainage or other signs of infection.  There is a large hematoma noted about the incision.  Skin about the incision appears healthy.  Ecchymosis to upper are and right side of chest as expected.  The arm is soft and nontender.  Intact motor and sensory function in the lower arm and hand.  Palpable radial pulse.       Imaging:  None taken    Assessment:  1 week s/p right shoulder replacement with hematoma    Plan:  I was able to express a moderate amount of sanguinous discharge through the incision.  Afterward, the swelling was significantly reduced.  I provided him with instructions and supplies to perform daily dressing changes.  I prescribed Keflex for him as a prophylactic antibiotic.  The risks and dosing instructions for this medication were discussed.  He acknowledged understanding of all we discussed.  He will follow-up with Dr. Holbrook as previously scheduled next week.    Nohemy Perez, NICOLE     01/20/2021

## 2021-01-27 ENCOUNTER — OFFICE VISIT (OUTPATIENT)
Dept: ORTHOPEDIC SURGERY | Facility: CLINIC | Age: 86
End: 2021-01-27

## 2021-01-27 VITALS — BODY MASS INDEX: 26.81 KG/M2 | TEMPERATURE: 97.2 F | WEIGHT: 181 LBS | HEIGHT: 69 IN

## 2021-01-27 DIAGNOSIS — R52 PAIN: Primary | ICD-10-CM

## 2021-01-27 PROCEDURE — 99024 POSTOP FOLLOW-UP VISIT: CPT | Performed by: ORTHOPAEDIC SURGERY

## 2021-01-27 PROCEDURE — 73030 X-RAY EXAM OF SHOULDER: CPT | Performed by: ORTHOPAEDIC SURGERY

## 2021-01-27 NOTE — PROGRESS NOTES
Mr. Schulz is now almost 2 weeks out from his surgery.  He says the drainage has largely resolved over the past 24 to 48 hours.  Not having any pain and is off of the pain medicine.  Denies any complaints or issues.    His wound is benign appearing.  I was unable to express any drainage.  I took him through short arc range of motion and he tolerated this very well.  He still has a small to moderate size hematoma.  All compartments in his arm and forearm are soft.  Intact motor and sensory function in his hand.    AP and scapular Y views of the right shoulder are ordered and reviewed.  These are compared to his postop films.  No complicating process noted.    Assessment: 2-week status post right reverse total shoulder arthroplasty with postoperative hematoma    Plan: He is no longer having drainage.  I do not consider that any intervention is warranted.  We will keep an eye on the hematoma.  I do not feel comfortable starting PT just yet.  I want to give him another week.  I will see him back in 1 week to reevaluate.  I told him that if the drainage recurs, he needs to notify me.  We may have to consider a washout.  He understands.

## 2021-02-01 ENCOUNTER — OFFICE VISIT (OUTPATIENT)
Dept: ORTHOPEDIC SURGERY | Facility: CLINIC | Age: 86
End: 2021-02-01

## 2021-02-01 VITALS — TEMPERATURE: 98.2 F | BODY MASS INDEX: 26.81 KG/M2 | WEIGHT: 181 LBS | HEIGHT: 69 IN

## 2021-02-01 DIAGNOSIS — Z09 SURGERY FOLLOW-UP: Primary | ICD-10-CM

## 2021-02-01 PROCEDURE — 99024 POSTOP FOLLOW-UP VISIT: CPT | Performed by: ORTHOPAEDIC SURGERY

## 2021-02-01 PROCEDURE — 73030 X-RAY EXAM OF SHOULDER: CPT | Performed by: ORTHOPAEDIC SURGERY

## 2021-02-01 NOTE — PROGRESS NOTES
Mr. Rothman follows up for his right shoulder.  He says it is doing great.  The drainage has resolved.  He is not having any pain.  No fevers, redness or other symptoms.  He is anxious to get going with the therapy.    Incision is healed and benign appearing.  Hematoma is nearly resolved.  Motion is well-tolerated.    AP and scapular Y views of the right shoulder are ordered and reviewed.  These are compared to previous x-rays.  I can still see the hematoma on the x-ray.  It looks about the same, maybe a little smaller.  There are some calcifications medially seen best on the Y view.  These are unchanged.  The implants look fine.    Assessment: 3-week status post right reverse total shoulder arthroplasty with resolving postop hematoma    Plan: I am going to go ahead and have him start PT.  I will see him back in another month.  Appropriate activity modifications and restrictions were discussed    Jayden Holbrook MD

## 2021-02-03 ENCOUNTER — HOSPITAL ENCOUNTER (OUTPATIENT)
Dept: PHYSICAL THERAPY | Facility: HOSPITAL | Age: 86
Setting detail: THERAPIES SERIES
Discharge: HOME | End: 2021-02-03

## 2021-02-03 DIAGNOSIS — M25.511 ACUTE PAIN OF RIGHT SHOULDER: Primary | ICD-10-CM

## 2021-02-03 DIAGNOSIS — Z47.89 ORTHOPEDIC AFTERCARE: ICD-10-CM

## 2021-02-03 DIAGNOSIS — M25.611 DECREASED RIGHT SHOULDER RANGE OF MOTION: ICD-10-CM

## 2021-02-03 PROCEDURE — 97161 PT EVAL LOW COMPLEX 20 MIN: CPT

## 2021-02-03 PROCEDURE — 97110 THERAPEUTIC EXERCISES: CPT

## 2021-02-03 NOTE — THERAPY EVALUATION
Outpatient Physical Therapy Ortho Initial Evaluation  [facility]     Patient Name: Josue Rothman  : 1935  MRN: [id number]  Today's Date: 2/3/2021      Visit Date: 2021    Patient Active Problem List   Diagnosis   • Shoulder arthritis   • Status post reverse total replacement of left shoulder        Past Medical History:   Diagnosis Date   • History of kidney stones    • History of pneumonia    • History of transfusion     no reaction   • Hyperlipidemia    • Limited range of motion (ROM) of shoulder     right   • Osteoarthritis    • Paroxysmal atrial fibrillation (CMS/HCC)         Past Surgical History:   Procedure Laterality Date   • BACK SURGERY      RODS & SCREWS   • CATARACT EXTRACTION EXTRACAPSULAR W/ INTRAOCULAR LENS IMPLANTATION Bilateral    • COLONOSCOPY     • HERNIA REPAIR Bilateral     2x on left inguinal & 1 time on right inguinal   • KIDNEY STONE SURGERY     • TOTAL SHOULDER ARTHROPLASTY W/ DISTAL CLAVICLE EXCISION Left 9/10/2020    Procedure: TOTAL SHOULDER REVERSE ARTHROPLASTY;  Surgeon: Jayden Holbrook MD;  Location: [facility];  Service: Orthopedics;  Laterality: Left;   • TOTAL SHOULDER ARTHROPLASTY W/ DISTAL CLAVICLE EXCISION Right 2021    Procedure: Right Reverse Total Shoulder Arthroplasty;  Surgeon: Jayden Holbrook MD;  Location: [facility];  Service: Orthopedics;  Laterality: Right;       Visit Dx:     ICD-10-CM ICD-9-CM   1. Acute pain of right shoulder  M25.511 719.41   2. Orthopedic aftercare  Z47.89 V54.9   3. Decreased right shoulder range of motion  M25.611 719.51         Patient History     Row Name 21 1400             History    Chief Complaint  Pain;Difficulty with daily activities  -RS      Type of Pain  Shoulder pain  -RS      Brief Description of Current Complaint  The pt is an 86 yo male who presents s/p  R reverse total shoulder arthroplasty on 21. He had his L shoulder replaced in 2020. He presents to  clinic without sling donned and reports he has been driving. He reports no difficulty with sleeping and has not taken any pain medication since 4 days post. Op.  -RS      Previous treatment for THIS PROBLEM  Surgery  -RS      Surgery Date:  01/14/21  -RS         Pain     Pain Location  Shoulder  -RS         Daily Activities    Primary Language  English  -RS      Pt Participated in POC and Goals  Yes  -RS         Safety    Are you being hurt, hit, or frightened by anyone at home or in your life?  No  -RS      Are you being neglected by a caregiver  No  -RS        User Key  (r) = Recorded By, (t) = Taken By, (c) = Cosigned By    Initials Name Provider Type    RS Rena Knowles, PT Physical Therapist          PT Ortho     Row Name 02/03/21 1400       Posture/Observations    Observations  Incision healing no drainage noted  -RS    Posture/Observations Comments  pt presents to clinic without brace/sling donned, when asked, pt had sling in bag which he carried into the clinic  -RS       Right Upper Ext    Rt Shoulder Flexion AROM  --  -RS    Rt Shoulder Flexion PROM  0-103  -RS    Rt Shoulder External Rotation PROM  0-12  -RS       Left Upper Ext    Lt Shoulder Flexion AROM  0-120  -RS    Lt Shoulder External Rotation AROM  LUCIAN- back of occiput  -RS    Lt Shoulder Internal Rotation AROM  FIR T10  -RS       MMT Left Upper Ext    Lt Shoulder Flexion MMT, Gross Movement  (4/5) good  -RS    Lt Shoulder ABduction MMT, Gross Movement  (4-/5) good minus  -RS    Lt Shoulder Internal Rotation MMT, Gross Movement  (4/5) good  -RS    Lt Shoulder External Rotation MMT, Gross Movement  (4/5) good  -RS       Upper Extremity Flexibility    Upper Trapezius  Bilateral:;Mildly limited  -RS    Levator Scapula  Bilateral:;Mildly limited  -RS    Pect Minor  Bilateral:;Mildly limited;Moderately limited  -RS    Pect Major  Bilateral:;Mildly limited;Moderately limited  -RS      User Key  (r) = Recorded By, (t) = Taken By, (c) = Cosigned By     Initials Name Provider Type    RS Rena Knowles, PT Physical Therapist                      Therapy Education  Education Details: Access Code: [email or web address] Role of outpatient PT, POC, differential diagnosis, initial HEP, expectations, goals, anatomy. post op protocol  Given: Symptoms/condition management, HEP  Program: New  How Provided: Verbal, Demonstration, Written  Provided to: Patient  Level of Understanding: Verbalized, Demonstrated     PT OP Goals     Row Name 02/03/21 1600          PT Short Term Goals    STG Date to Achieve  02/24/21  -RS     STG 1  Pt will be compliant with precautions including sling usage and ROM restrictions in order to reduce potential of dislocation.   -RS     STG 1 Progress  New  -RS     STG 2  The pt will demonstrate R shoulder flex  PROM to at least 0-140  to facilitate improved functional reach and dressing.  -RS     STG 2 Progress  New  -RS        Long Term Goals    LTG Date to Achieve  04/04/21  -RS     LTG 1  The pt will report at least 75% improvement in dressing upper body to facilitate improved self-care ADL performance.  -RS     LTG 1 Progress  New  -RS     LTG 2  The pt will demonstrate IND and compliant with progressive HEP focused on IND condition management and return to PLOF.  -RS     LTG 2 Progress  New  -RS     LTG 3  The pt will score less than or equal to 20% disability on the QuickDASH to indicate improved perceived performance of ADLs.  -RS     LTG 3 Progress  New  -RS     LTG 4  The pt will demonstrate R shoulder  AROM flex to at least 0-130 and Abduction to at least 0-120 to facilitate improved functional reach.  -RS     LTG 4 Progress  New  -RS     LTG 5  The pt will demonstrate R shoulder strength to at least 4-/5 for improved functional reach and household chore performance.  -RS     LTG 5 Progress  New  -RS     LTG 6  The pt will demonstrate FIR R shoulder to at least T10 for improved self-care ADL performance.  -RS     LTG 6 Progress  New  -RS        Time  Calculation    PT Goal Re-Cert Due Date  05/04/21  -RS       User Key  (r) = Recorded By, (t) = Taken By, (c) = Cosigned By    Initials Name Provider Type    RS Rena Knowles PT Physical Therapist          PT Assessment/Plan     Row Name 02/03/21 1600          PT Assessment    Functional Limitations  Limitation in home management;Limitations in functional capacity and performance;Performance in leisure activities;Performance in self-care ADL  -RS     Impairments  Impaired flexibility;Muscle strength;Pain;Posture;Range of motion  -RS     Assessment Comments  Josue Rothman is a 85 y.o. male referred to physical therapy s/p R rev TSA on 1/14/21. He presents to clinic without sling donned and carrying his jacket/bag in R UE. He presents with a stable clinical presentation, along with no remarkable comorbidities and personal factors of previous history of L TSA, age, chronicity of condition that may impact her progress in the plan of care. Pt presents today with decreased R shoulder passive mobility and strength (per post op protocol), inc pain, healing incision . His signs and symptoms are consistent with referring diagnosis. The previous impairments limit his ability to perform self-care ADLs, sleep without waking in pain, functionally reach away from body, drive, perform household chores. The pt self-scores 40% disability on the QuickDASH (0=no disability).Reviewed post op protocol and safety with pt at length, pt left clinic wearing sling (no abduction pillow). Pt will benefit from skilled PT to address the previous impairments and return to PLOF.  -RS     Please refer to paper survey for additional self-reported information  Yes  -RS     Rehab Potential  Good  -RS     Patient/caregiver participated in establishment of treatment plan and goals  Yes  -RS     Patient would benefit from skilled therapy intervention  Yes  -RS        PT Plan    PT Frequency  2x/week  -RS     Predicted Duration of Therapy  Intervention (PT)  10-12 weeks  -RS     Planned CPT's?  PT EVAL LOW COMPLEXITY: 19992;PT RE-EVAL: 40454;PT THER PROC EA 15 MIN: 35974;PT THER ACT EA 15 MIN: 22499;PT MANUAL THERAPY EA 15 MIN: 59536;PT NEUROMUSC RE-EDUCATION EA 15 MIN: 48579;PT HOT OR COLD PACK TREAT MCARE;PT ELECTRICAL STIM UNATTEND:   -RS     PT Plan Comments  Progress per protocol, continnue to emphasize importance of compliance with post op protocol. Consider AAROM at week 4, manual therapy flex to 140.  -RS       User Key  (r) = Recorded By, (t) = Taken By, (c) = Cosigned By    Initials Name Provider Type    Rena Flynn, ALLYN Physical Therapist            OP Exercises     Row Name 02/03/21 1500             Total Minutes    46663 - PT Therapeutic Exercise Minutes  10  -RS      83120 - PT Manual Therapy Minutes  10  -RS         Exercise 1    Exercise Name 1  scap retract/post shoulder roll  -RS      Cueing 1  Verbal;Demo  -RS      Reps 1  10ea  -RS         Exercise 2    Exercise Name 2  elbow AROM flex/ext and pronation/supination  -RS      Cueing 2  Verbal  -RS      Reps 2  10ea  -RS         Exercise 3    Exercise Name 3  pendulums  -RS        User Key  (r) = Recorded By, (t) = Taken By, (c) = Cosigned By    Initials Name Provider Type    RS Rena Knowles, ALLYN Physical Therapist           Manual Rx (last 36 hours)      Manual Treatments     Row Name 02/03/21 1500             Total Minutes    14787 - PT Manual Therapy Minutes  10  -RS         Manual Rx 1    Manual Rx 1 Location  R shoulder PROM flex to approx 100, ER to approx 15  -RS      Manual Rx 1 Type  gentle oscillations/ post glides  -RS        User Key  (r) = Recorded By, (t) = Taken By, (c) = Cosigned By    Initials Name Provider Type    RS Rena Knowles PT Physical Therapist                      Outcome Measure Options: Quick DASH  Quick DASH  Open a tight or new jar.: Moderate Difficulty  Do heavy household chores (e.g., wash walls, wash floors): Moderate  Difficulty  Carry a shopping bag or briefcase: Mild Difficulty  Wash your back: Severe Difficulty  Use a knife to cut food: Mild Difficulty  Recreational activities in which you take some force or impact through your arm, should or hand (e.g. golf, hammering, tennis, etc.): Moderate Difficulty  During the past week, to what extent has your arm, shoulder, or hand problem interfered with your normal social activites with family, friends, neighbors or groups?: Quite a bit  During the past week, were you limited in your work or other regular daily activities as a result of your arm, shoulder or hand problem?: Moderately Limited  Arm, Shoulder, or hand pain: Moderate  Tingling (pins and needles) in your arm, shoulder, or hand: None  During the past week, how much difficulty have you had sleeping because of the pain in your arm, shoulder or hand?: No difficulty  Number of Questions Answered: 11  Quick DASH Score: 40.91         Time Calculation:     Start Time: 1430  Stop Time: 1510  Time Calculation (min): 40 min     Therapy Charges for Today     Code Description Service Date Service Provider Modifiers Qty    59168052869 HC PT THER PROC EA 15 MIN 2/3/2021 Rena Knowles, PT GP 1    84946805158 HC PT EVAL LOW COMPLEXITY 2 2/3/2021 Rena Knowles, PT GP 1          PT G-Codes  Outcome Measure Options: Quick DASH  Quick DASH Score: 40.91         Rena Knowles PT  2/3/2021

## 2021-02-05 ENCOUNTER — HOSPITAL ENCOUNTER (OUTPATIENT)
Dept: PHYSICAL THERAPY | Facility: HOSPITAL | Age: 86
Setting detail: THERAPIES SERIES
Discharge: HOME | End: 2021-02-05

## 2021-02-05 DIAGNOSIS — Z47.89 ORTHOPEDIC AFTERCARE: ICD-10-CM

## 2021-02-05 DIAGNOSIS — M25.511 ACUTE PAIN OF RIGHT SHOULDER: Primary | ICD-10-CM

## 2021-02-05 DIAGNOSIS — M25.512 ACUTE PAIN OF LEFT SHOULDER: ICD-10-CM

## 2021-02-05 DIAGNOSIS — M25.611 DECREASED RIGHT SHOULDER RANGE OF MOTION: ICD-10-CM

## 2021-02-05 PROCEDURE — 97140 MANUAL THERAPY 1/> REGIONS: CPT

## 2021-02-05 PROCEDURE — 97110 THERAPEUTIC EXERCISES: CPT

## 2021-02-05 NOTE — THERAPY TREATMENT NOTE
Outpatient Physical Therapy Ortho Treatment Note  [facility]     Patient Name: Josue Rothman  : 1935  MRN: [id number]  Today's Date: 2021      Visit Date: 2021    Visit Dx:    ICD-10-CM ICD-9-CM   1. Acute pain of right shoulder  M25.511 719.41   2. Orthopedic aftercare  Z47.89 V54.9   3. Decreased right shoulder range of motion  M25.611 719.51   4. Acute pain of left shoulder  M25.512 719.41       Patient Active Problem List   Diagnosis   • Shoulder arthritis   • Status post reverse total replacement of left shoulder        Past Medical History:   Diagnosis Date   • History of kidney stones    • History of pneumonia    • History of transfusion     no reaction   • Hyperlipidemia    • Limited range of motion (ROM) of shoulder     right   • Osteoarthritis    • Paroxysmal atrial fibrillation (CMS/HCC)         Past Surgical History:   Procedure Laterality Date   • BACK SURGERY      RODS & SCREWS   • CATARACT EXTRACTION EXTRACAPSULAR W/ INTRAOCULAR LENS IMPLANTATION Bilateral    • COLONOSCOPY     • HERNIA REPAIR Bilateral     2x on left inguinal & 1 time on right inguinal   • KIDNEY STONE SURGERY     • TOTAL SHOULDER ARTHROPLASTY W/ DISTAL CLAVICLE EXCISION Left 9/10/2020    Procedure: TOTAL SHOULDER REVERSE ARTHROPLASTY;  Surgeon: Jayden Holbrook MD;  Location: [facility];  Service: Orthopedics;  Laterality: Left;   • TOTAL SHOULDER ARTHROPLASTY W/ DISTAL CLAVICLE EXCISION Right 2021    Procedure: Right Reverse Total Shoulder Arthroplasty;  Surgeon: Jayden Holbrook MD;  Location: [facility];  Service: Orthopedics;  Laterality: Right;                       PT Assessment/Plan     Row Name 21 0952          PT Assessment    Assessment Comments  Pt returns for first follow up since evaluation reporting improved compliance with sling use and he wears it today entering clinic. Continued PROM/postural strengthening as protocol allows. Pt 3 weeks s/p R rTSA. PROM  flexion to 115 deg, ER to 20 deg with ease. R shoulder remains swollen and slight puffiness noted in R hand. Pt reports he had been performing daily dressing changes but now bleeding has stopped and he no longer requires dressing.  -LB        PT Plan    PT Plan Comments  Continue to progress per protocol.  -LB       User Key  (r) = Recorded By, (t) = Taken By, (c) = Cosigned By    Initials Name Provider Type    Merary Romero, ALLYN Physical Therapist            OP Exercises     Row Name 02/05/21 0900             Subjective Comments    Subjective Comments  I am doing fine. I have finally gotten the bleeding to stop. I am being careful and wearing my sling when I go out.  -LB         Subjective Pain    Able to rate subjective pain?  yes  -LB      Pre-Treatment Pain Level  1  -LB         Total Minutes    90178 - PT Therapeutic Exercise Minutes  15  -LB      29021 - PT Manual Therapy Minutes  25  -LB         Exercise 1    Exercise Name 1  scap retract/post shoulder roll  -LB      Cueing 1  Verbal;Demo  -LB      Reps 1  10ea  -LB         Exercise 2    Exercise Name 2  elbow AROM flex/ext and pronation/supination  -LB      Cueing 2  Verbal  -LB      Reps 2  10ea  -LB         Exercise 3    Exercise Name 3  pendulums  -LB      Time 3  30 seconds  -LB      Additional Comments  A/P, M/L, CW/CCW  -LB        User Key  (r) = Recorded By, (t) = Taken By, (c) = Cosigned By    Initials Name Provider Type    Merary Romero, PT Physical Therapist                      Manual Rx (last 36 hours)      Manual Treatments     Row Name 02/05/21 0900             Total Minutes    91334 - PT Manual Therapy Minutes  25  -LB         Manual Rx 1    Manual Rx 1 Location  R shoulder PROM flex to 15, ER to 20 with ease; did not progress ER per protocol  -LB      Manual Rx 1 Type  gentle oscillations/ post glides  -LB      Manual Rx 1 Duration  25  -LB        User Key  (r) = Recorded By, (t) = Taken By, (c) = Cosigned By    Initials Name Provider  Type    Merary Romero, ALLYN Physical Therapist          PT OP Goals     Row Name 02/05/21 0900          PT Short Term Goals    STG Date to Achieve  02/24/21  -LB     STG 1  Pt will be compliant with precautions including sling usage and ROM restrictions in order to reduce potential of dislocation.   -LB     STG 1 Progress  Progressing  -LB     STG 1 Progress Comments  Pt wearing sling today.  -LB     STG 2  The pt will demonstrate R shoulder flex  PROM to at least 0-140  to facilitate improved functional reach and dressing.  -LB     STG 2 Progress  Ongoing  -LB     STG 2 Progress Comments  Flexion to 115 deg today.  -LB        Long Term Goals    LTG Date to Achieve  04/04/21  -LB     LTG 1  The pt will report at least 75% improvement in dressing upper body to facilitate improved self-care ADL performance.  -LB     LTG 1 Progress  New  -LB     LTG 2  The pt will demonstrate IND and compliant with progressive HEP focused on IND condition management and return to PLOF.  -LB     LTG 2 Progress  New  -LB     LTG 3  The pt will score less than or equal to 20% disability on the QuickDASH to indicate improved perceived performance of ADLs.  -LB     LTG 3 Progress  New  -LB     LTG 4  The pt will demonstrate R shoulder  AROM flex to at least 0-130 and Abduction to at least 0-120 to facilitate improved functional reach.  -LB     LTG 4 Progress  New  -LB     LTG 5  The pt will demonstrate R shoulder strength to at least 4-/5 for improved functional reach and household chore performance.  -LB     LTG 5 Progress  New  -LB     LTG 6  The pt will demonstrate FIR R shoulder to at least T10 for improved self-care ADL performance.  -LB     LTG 6 Progress  New  -LB       User Key  (r) = Recorded By, (t) = Taken By, (c) = Cosigned By    Initials Name Provider Type    Merary Romero PT Physical Therapist          Therapy Education  Education Details: reinforced sling use, avoiding AROM ER/IR, lifting weighted objects  Given:  Symptoms/condition management, Posture/body mechanics, HEP, Mobility training  Program: Reinforced  How Provided: Verbal  Provided to: Patient  Level of Understanding: Teach back education performed, Verbalized, Demonstrated              Time Calculation:   Start Time: 0915  Stop Time: 1000  Time Calculation (min): 45 min  Total Timed Code Minutes- PT: 43 minute(s)  Therapy Charges for Today     Code Description Service Date Service Provider Modifiers Qty    60054313604  PT THER PROC EA 15 MIN 2/5/2021 Merary Nascimneto, PT GP 1    [id number]  PT MANUAL THERAPY EA 15 MIN 2/5/2021 Merary Nascimento, PT GP 2                    Merary Nascimento, PT  2/5/2021

## 2021-02-10 ENCOUNTER — HOSPITAL ENCOUNTER (OUTPATIENT)
Dept: PHYSICAL THERAPY | Facility: HOSPITAL | Age: 86
Setting detail: THERAPIES SERIES
Discharge: HOME | End: 2021-02-10

## 2021-02-10 DIAGNOSIS — M25.511 ACUTE PAIN OF RIGHT SHOULDER: Primary | ICD-10-CM

## 2021-02-10 DIAGNOSIS — M25.611 DECREASED RIGHT SHOULDER RANGE OF MOTION: ICD-10-CM

## 2021-02-10 DIAGNOSIS — Z47.89 ORTHOPEDIC AFTERCARE: ICD-10-CM

## 2021-02-10 PROCEDURE — 97110 THERAPEUTIC EXERCISES: CPT

## 2021-02-10 PROCEDURE — 97140 MANUAL THERAPY 1/> REGIONS: CPT

## 2021-02-10 NOTE — THERAPY TREATMENT NOTE
Outpatient Physical Therapy Ortho Treatment Note  [facility]     Patient Name: Josue Rothman  : 1935  MRN: [id number]  Today's Date: 2/10/2021      Visit Date: 02/10/2021    Visit Dx:    ICD-10-CM ICD-9-CM   1. Acute pain of right shoulder  M25.511 719.41   2. Orthopedic aftercare  Z47.89 V54.9   3. Decreased right shoulder range of motion  M25.611 719.51       Patient Active Problem List   Diagnosis   • Shoulder arthritis   • Status post reverse total replacement of left shoulder        Past Medical History:   Diagnosis Date   • History of kidney stones    • History of pneumonia    • History of transfusion     no reaction   • Hyperlipidemia    • Limited range of motion (ROM) of shoulder     right   • Osteoarthritis    • Paroxysmal atrial fibrillation (CMS/HCC)         Past Surgical History:   Procedure Laterality Date   • BACK SURGERY      RODS & SCREWS   • CATARACT EXTRACTION EXTRACAPSULAR W/ INTRAOCULAR LENS IMPLANTATION Bilateral    • COLONOSCOPY     • HERNIA REPAIR Bilateral     2x on left inguinal & 1 time on right inguinal   • KIDNEY STONE SURGERY     • TOTAL SHOULDER ARTHROPLASTY W/ DISTAL CLAVICLE EXCISION Left 9/10/2020    Procedure: TOTAL SHOULDER REVERSE ARTHROPLASTY;  Surgeon: Jayden Holbrook MD;  Location: [facility];  Service: Orthopedics;  Laterality: Left;   • TOTAL SHOULDER ARTHROPLASTY W/ DISTAL CLAVICLE EXCISION Right 2021    Procedure: Right Reverse Total Shoulder Arthroplasty;  Surgeon: Jayden Holbrook MD;  Location: [facility];  Service: Orthopedics;  Laterality: Right;                       PT Assessment/Plan     Row Name 02/10/21 1341          PT Assessment    Assessment Comments  Pt reporting soreness today in biceps with guarding noted with gentle STM during manual therapy especially proximal. Continued PROM within protocol guidelines and added AAROM in flexion only and isometric flexion/extension as pt is now 4 weeks post op. Pt tends to  perform in slight scaption despite cuing. Did not add AAROM ER due to ease of pt's movement.  -LB        PT Plan    PT Plan Comments  Continue to progress per protocol.  -LB       User Key  (r) = Recorded By, (t) = Taken By, (c) = Cosigned By    Initials Name Provider Type    Merary Romero, PT Physical Therapist            OP Exercises     Row Name 02/10/21 1100             Subjective Comments    Subjective Comments  I am doing fine. I am a little sore in my biceps and in the joint today.  -LB         Subjective Pain    Able to rate subjective pain?  yes  -LB      Pre-Treatment Pain Level  2  -LB         Total Minutes    74014 - PT Therapeutic Exercise Minutes  15  -LB      39739 - PT Manual Therapy Minutes  25  -LB         Exercise 1    Exercise Name 1  scap retract/post shoulder roll  -LB      Cueing 1  Verbal;Demo  -LB      Reps 1  10ea  -LB         Exercise 2    Exercise Name 2  elbow AROM flex/ext and pronation/supination  -LB      Cueing 2  Verbal  -LB      Reps 2  10ea  -LB         Exercise 3    Exercise Name 3  pendulums  -LB      Time 3  30 seconds  -LB         Exercise 4    Exercise Name 4  AAROM flexion with dowel and self assist  -LB      Reps 4  10  -LB      Additional Comments  each;  -LB         Exercise 5    Exercise Name 5  isometric flexion/extension  -LB      Reps 5  10  -LB      Time 5  5  -LB        User Key  (r) = Recorded By, (t) = Taken By, (c) = Cosigned By    Initials Name Provider Type    Merary Romero, PT Physical Therapist                      Manual Rx (last 36 hours)      Manual Treatments     Row Name 02/10/21 1100             Total Minutes    26220 - PT Manual Therapy Minutes  25  -LB         Manual Rx 1    Manual Rx 1 Location  R shoulder PROM flex to 140, ER to 20 with ease; did not progress ER per protocol  -LB      Manual Rx 1 Type  gentle oscillations/ post glides  -LB      Manual Rx 1 Duration  25  -LB        User Key  (r) = Recorded By, (t) = Taken By, (c) = Cosigned By     Initials Name Provider Type    Merary Romero PT Physical Therapist          PT OP Goals     Row Name 02/10/21 1200          PT Short Term Goals    STG Date to Achieve  02/24/21  -LB     STG 1  Pt will be compliant with precautions including sling usage and ROM restrictions in order to reduce potential of dislocation.   -LB     STG 1 Progress  Progressing  -LB     STG 2  The pt will demonstrate R shoulder flex  PROM to at least 0-140  to facilitate improved functional reach and dressing.  -LB     STG 2 Progress  Ongoing  -LB        Long Term Goals    LTG Date to Achieve  04/04/21  -LB     LTG 1  The pt will report at least 75% improvement in dressing upper body to facilitate improved self-care ADL performance.  -LB     LTG 1 Progress  New  -LB     LTG 2  The pt will demonstrate IND and compliant with progressive HEP focused on IND condition management and return to PLOF.  -LB     LTG 2 Progress  New  -LB     LTG 3  The pt will score less than or equal to 20% disability on the QuickDASH to indicate improved perceived performance of ADLs.  -LB     LTG 3 Progress  New  -LB     LTG 4  The pt will demonstrate R shoulder  AROM flex to at least 0-130 and Abduction to at least 0-120 to facilitate improved functional reach.  -LB     LTG 4 Progress  New  -LB     LTG 5  The pt will demonstrate R shoulder strength to at least 4-/5 for improved functional reach and household chore performance.  -LB     LTG 5 Progress  New  -LB     LTG 6  The pt will demonstrate FIR R shoulder to at least T10 for improved self-care ADL performance.  -LB     LTG 6 Progress  New  -LB       User Key  (r) = Recorded By, (t) = Taken By, (c) = Cosigned By    Initials Name Provider Type    Merary Romero PT Physical Therapist          Therapy Education  Given: Symptoms/condition management  Program: Reinforced  How Provided: Verbal  Provided to: Patient  Level of Understanding: Teach back education performed, Verbalized, Demonstrated               Time Calculation:   Start Time: 0900  Stop Time: 0945  Time Calculation (min): 45 min  Total Timed Code Minutes- PT: 42 minute(s)  Therapy Charges for Today     Code Description Service Date Service Provider Modifiers Qty    42179779209  PT THER PROC EA 15 MIN 2/10/2021 Merary Nascimento, PT GP 1    09513973301 HC PT MANUAL THERAPY EA 15 MIN 2/10/2021 Merary Nascimento, PT GP 2                    Merary Nascimento PT  2/10/2021

## 2021-02-12 ENCOUNTER — HOSPITAL ENCOUNTER (OUTPATIENT)
Dept: PHYSICAL THERAPY | Facility: HOSPITAL | Age: 86
Setting detail: THERAPIES SERIES
Discharge: HOME | End: 2021-02-12

## 2021-02-12 DIAGNOSIS — M25.512 ACUTE PAIN OF LEFT SHOULDER: ICD-10-CM

## 2021-02-12 DIAGNOSIS — Z47.89 ORTHOPEDIC AFTERCARE: ICD-10-CM

## 2021-02-12 DIAGNOSIS — M25.611 DECREASED RIGHT SHOULDER RANGE OF MOTION: ICD-10-CM

## 2021-02-12 DIAGNOSIS — M25.511 ACUTE PAIN OF RIGHT SHOULDER: Primary | ICD-10-CM

## 2021-02-12 PROCEDURE — 97140 MANUAL THERAPY 1/> REGIONS: CPT | Performed by: PHYSICAL THERAPIST

## 2021-02-12 PROCEDURE — 97110 THERAPEUTIC EXERCISES: CPT | Performed by: PHYSICAL THERAPIST

## 2021-02-12 NOTE — THERAPY TREATMENT NOTE
Outpatient Physical Therapy Ortho Treatment Note  [facility]     Patient Name: Josue Rothman  : 1935  MRN: [id number]  Today's Date: 2021      Visit Date: 2021    Visit Dx:    ICD-10-CM ICD-9-CM   1. Acute pain of right shoulder  M25.511 719.41   2. Orthopedic aftercare  Z47.89 V54.9   3. Decreased right shoulder range of motion  M25.611 719.51   4. Acute pain of left shoulder  M25.512 719.41       Patient Active Problem List   Diagnosis   • Shoulder arthritis   • Status post reverse total replacement of left shoulder        Past Medical History:   Diagnosis Date   • History of kidney stones    • History of pneumonia    • History of transfusion     no reaction   • Hyperlipidemia    • Limited range of motion (ROM) of shoulder     right   • Osteoarthritis    • Paroxysmal atrial fibrillation (CMS/HCC)         Past Surgical History:   Procedure Laterality Date   • BACK SURGERY      RODS & SCREWS   • CATARACT EXTRACTION EXTRACAPSULAR W/ INTRAOCULAR LENS IMPLANTATION Bilateral    • COLONOSCOPY     • HERNIA REPAIR Bilateral     2x on left inguinal & 1 time on right inguinal   • KIDNEY STONE SURGERY     • TOTAL SHOULDER ARTHROPLASTY W/ DISTAL CLAVICLE EXCISION Left 9/10/2020    Procedure: TOTAL SHOULDER REVERSE ARTHROPLASTY;  Surgeon: Jayden Holbrook MD;  Location: [facility];  Service: Orthopedics;  Laterality: Left;   • TOTAL SHOULDER ARTHROPLASTY W/ DISTAL CLAVICLE EXCISION Right 2021    Procedure: Right Reverse Total Shoulder Arthroplasty;  Surgeon: Jayden Holbrook MD;  Location: [facility];  Service: Orthopedics;  Laterality: Right;       PT Ortho     Row Name 21 4450       Subjective Comments    Subjective Comments  Reports realizing that soreness during last treatment was related to receiving 2nd Covid shot.  Symptoms have improvement- mild R shoulder pain today  -JS       Subjective Pain    Able to rate subjective pain?  yes  -JS    Pre-Treatment Pain  Level  1  -JS       Right Upper Ext    Rt Shoulder Flexion PROM  125 degrees  -JS    Rt Shoulder External Rotation PROM  20 degrees  -JS      User Key  (r) = Recorded By, (t) = Taken By, (c) = Cosigned By    Initials Name Provider Type    Yanelis Bates, PT Physical Therapist                      PT Assessment/Plan     Row Name 02/12/21 0830          PT Assessment    Assessment Comments  Pt is 4 weeks, 1 days post op R rTSA.  Pt with improvement in subjective report of pain today.  Demonstrates improving R shoulder flexion AAROM/PROM.  No further progression of ER AAROM/PROM per protocol. Intermittent cueing for proper technique during current isometric strengthening exercises.  -JS        PT Plan    PT Plan Comments  Continue PT per post-op protocol.  -JS       User Key  (r) = Recorded By, (t) = Taken By, (c) = Cosigned By    Initials Name Provider Type    Yanelis Bates, PT Physical Therapist            OP Exercises     Row Name 02/12/21 0830             Subjective Comments    Subjective Comments  Reports realizing that soreness during last treatment was related to receiving 2nd Covid shot.  Symptoms have improvement- mild R shoulder pain today  -JS         Subjective Pain    Able to rate subjective pain?  yes  -JS      Pre-Treatment Pain Level  1  -JS         Total Minutes    20579 - PT Therapeutic Exercise Minutes  25  -JS      75786 - PT Manual Therapy Minutes  20  -JS         Exercise 1    Exercise Name 1  scap retract/post shoulder roll  -JS      Cueing 1  Verbal;Demo cues to engage scapular mm's, avoid upper trap compensation  -JS      Reps 1  10ea  -JS         Exercise 2    Exercise Name 2  elbow AROM flex/ext and pronation/supination  -JS      Cueing 2  Verbal  -JS      Sets 2  2  -JS      Reps 2  10ea  -JS         Exercise 3    Exercise Name 3  pendulums  -JS      Time 3  30 sec each  -JS      Additional Comments  A/P, M/L, CW/CCW  -JS         Exercise 4    Exercise Name 4  AAROM flexion with dowel and  self assist  -JS      Reps 4  10  -JS      Additional Comments  each  -JS         Exercise 5    Exercise Name 5  isometric flex/ext/abd  -JS      Cueing 5  Verbal;Demo cues for upright posture, isolation of movement to shoulder  -JS      Reps 5  10  -JS      Time 5  5 sec  -JS      Additional Comments  sub-max  -JS         Exercise 6    Exercise Name 6  Overhead pulleys  -JS      Cueing 6  Verbal;Demo  -JS      Reps 6  3 min  -JS      Additional Comments  within post-op protocol (flex protocol 140 degrees)  -JS         Exercise 7    Exercise Name 7  Shoulder ER AAROM with dowel (limit to 20 degrees)  -JS      Cueing 7  --  -JS      Reps 7  10  -JS      Time 7  --  -JS      Additional Comments  --  -JS        User Key  (r) = Recorded By, (t) = Taken By, (c) = Cosigned By    Initials Name Provider Type    Yanelis Bates, PT Physical Therapist                      Manual Rx (last 36 hours)      Manual Treatments     Row Name 02/12/21 0830             Total Minutes    38802 - PT Manual Therapy Minutes  20  -JS         Manual Rx 1    Manual Rx 1 Location  R shoulder PROM flex ( protocol to 140), ER to 20 with ease; did not progress ER per protocol  -      Manual Rx 1 Type  gentle oscillations/ distractions.  GH post glides  -JS      Manual Rx 1 Grade     -JS      Manual Rx 1 Duration  20 min  -JS        User Key  (r) = Recorded By, (t) = Taken By, (c) = Cosigned By    Initials Name Provider Type    Yanelis Bates, PT Physical Therapist          PT OP Goals     Row Name 02/12/21 0830          PT Short Term Goals    STG Date to Achieve  02/24/21  -     STG 1  Pt will be compliant with precautions including sling usage and ROM restrictions in order to reduce potential of dislocation.   -JS     STG 1 Progress  Met  -     STG 2  The pt will demonstrate R shoulder flex  PROM to at least 0-140  to facilitate improved functional reach and dressing.  -JS     STG 2 Progress  Ongoing  -     STG 2 Progress Comments  PROM R  shoulder flex 125  -        Long Term Goals    LTG Date to Achieve  04/04/21  -JS     LTG 1  The pt will report at least 75% improvement in dressing upper body to facilitate improved self-care ADL performance.  -JS     LTG 1 Progress  Ongoing  -JS     LTG 2  The pt will demonstrate IND and compliant with progressive HEP focused on IND condition management and return to PLOF.  -JS     LTG 2 Progress  Ongoing  -JS     LTG 3  The pt will score less than or equal to 20% disability on the QuickDASH to indicate improved perceived performance of ADLs.  -JS     LTG 3 Progress  Ongoing  -JS     LTG 4  The pt will demonstrate R shoulder  AROM flex to at least 0-130 and Abduction to at least 0-120 to facilitate improved functional reach.  -JS     LTG 4 Progress  Ongoing  -JS     LTG 5  The pt will demonstrate R shoulder strength to at least 4-/5 for improved functional reach and household chore performance.  -JS     LTG 5 Progress  Ongoing  -     LTG 6  The pt will demonstrate FIR R shoulder to at least T10 for improved self-care ADL performance.  -     LTG 6 Progress  Ongoing  -       User Key  (r) = Recorded By, (t) = Taken By, (c) = Cosigned By    Initials Name Provider Type    Yanelis Bates PT Physical Therapist                         Time Calculation:   Start Time: 0830  Stop Time: 0915  Time Calculation (min): 45 min  Therapy Charges for Today     Code Description Service Date Service Provider Modifiers Qty    78445851740  PT THER PROC EA 15 MIN 2/12/2021 Yanelis Tan PT GP 2    [id number]  PT MANUAL THERAPY EA 15 MIN 2/12/2021 Yanelis Tan PT GP 1                    Yanelis Tan PT  2/12/2021

## 2021-02-15 ENCOUNTER — HOSPITAL ENCOUNTER (OUTPATIENT)
Dept: PHYSICAL THERAPY | Facility: HOSPITAL | Age: 86
Setting detail: THERAPIES SERIES
Discharge: HOME | End: 2021-02-15

## 2021-02-15 DIAGNOSIS — M25.611 DECREASED RIGHT SHOULDER RANGE OF MOTION: ICD-10-CM

## 2021-02-15 DIAGNOSIS — Z47.89 ORTHOPEDIC AFTERCARE: ICD-10-CM

## 2021-02-15 DIAGNOSIS — M25.512 ACUTE PAIN OF LEFT SHOULDER: ICD-10-CM

## 2021-02-15 DIAGNOSIS — M25.511 ACUTE PAIN OF RIGHT SHOULDER: Primary | ICD-10-CM

## 2021-02-15 PROCEDURE — 97110 THERAPEUTIC EXERCISES: CPT

## 2021-02-15 PROCEDURE — 97140 MANUAL THERAPY 1/> REGIONS: CPT

## 2021-02-15 NOTE — THERAPY TREATMENT NOTE
Outpatient Physical Therapy Ortho Treatment Note  [facility]     Patient Name: Josue Rothman  : 1935  MRN: [id number]  Today's Date: 2/15/2021      Visit Date: 02/15/2021    Visit Dx:    ICD-10-CM ICD-9-CM   1. Acute pain of right shoulder  M25.511 719.41   2. Orthopedic aftercare  Z47.89 V54.9   3. Decreased right shoulder range of motion  M25.611 719.51   4. Acute pain of left shoulder  M25.512 719.41       Patient Active Problem List   Diagnosis   • Shoulder arthritis   • Status post reverse total replacement of left shoulder        Past Medical History:   Diagnosis Date   • History of kidney stones    • History of pneumonia    • History of transfusion     no reaction   • Hyperlipidemia    • Limited range of motion (ROM) of shoulder     right   • Osteoarthritis    • Paroxysmal atrial fibrillation (CMS/HCC)         Past Surgical History:   Procedure Laterality Date   • BACK SURGERY      RODS & SCREWS   • CATARACT EXTRACTION EXTRACAPSULAR W/ INTRAOCULAR LENS IMPLANTATION Bilateral    • COLONOSCOPY     • HERNIA REPAIR Bilateral     2x on left inguinal & 1 time on right inguinal   • KIDNEY STONE SURGERY     • TOTAL SHOULDER ARTHROPLASTY W/ DISTAL CLAVICLE EXCISION Left 9/10/2020    Procedure: TOTAL SHOULDER REVERSE ARTHROPLASTY;  Surgeon: Jayden Holbrook MD;  Location: [facility];  Service: Orthopedics;  Laterality: Left;   • TOTAL SHOULDER ARTHROPLASTY W/ DISTAL CLAVICLE EXCISION Right 2021    Procedure: Right Reverse Total Shoulder Arthroplasty;  Surgeon: Jayden Holbrook MD;  Location: [facility];  Service: Orthopedics;  Laterality: Right;                       PT Assessment/Plan     Row Name 02/15/21 0813          PT Assessment    Assessment Comments  Patient s/p right rev TSA approx 4.5 weeks. Pain well controlled. PROM improving  -WS       User Key  (r) = Recorded By, (t) = Taken By, (c) = Cosigned By    Initials Name Provider Type    Silvano Hinson PTA  Physical Therapy Assistant            OP Exercises     Row Name 02/15/21 0730 02/15/21 0700          Subjective Comments    Subjective Comments  made alot of progress  -WS  --        Subjective Pain    Able to rate subjective pain?  yes  -WS  --     Pre-Treatment Pain Level  0  -WS  --     Subjective Pain Comment  5 weeks post op this Thurs  -WS  --        Total Minutes    37230 - PT Therapeutic Exercise Minutes  25  -WS  --     17010 - PT Manual Therapy Minutes  --  15  -WS        Exercise 1    Exercise Name 1  scap retract/post shoulder roll  -WS  --     Cueing 1  Verbal;Demo cues to engage scapular mm's, avoid upper trap compensation  -WS  --     Reps 1  15ea  -WS  --        Exercise 2    Exercise Name 2  elbow AROM flex/ext and pronation/supination  -WS  --     Cueing 2  Verbal  -WS  --     Sets 2  2  -WS  --     Reps 2  10ea  -WS  --        Exercise 3    Exercise Name 3  pendulums  -WS  --     Time 3  30 sec each  -WS  --        Exercise 4    Exercise Name 4  AAROM flexion with dowel and self assist  -WS  --     Reps 4  10  -WS  --     Additional Comments  each  -WS  --        Exercise 5    Exercise Name 5  isometric flex/ext/abd  -WS  --     Cueing 5  Verbal;Demo cues for upright posture, isolation of movement to shoulder  -WS  --     Reps 5  10  -WS  --     Time 5  5 sec  -WS  --        Exercise 6    Exercise Name 6  Overhead pulleys  -WS  --     Cueing 6  Verbal;Demo  -WS  --     Reps 6  3 min  -WS  --        Exercise 7    Exercise Name 7  Shoulder ER AAROM with dowel (limit to 20 degrees)  -WS  --     Reps 7  10  -WS  --        Exercise 8    Exercise Name 8  chest press dowel  -WS  --     Reps 8  10  -WS  --     Additional Comments  slow reps  -WS  --       User Key  (r) = Recorded By, (t) = Taken By, (c) = Cosigned By    Initials Name Provider Type    Silvano Hinson PTA Physical Therapy Assistant                      Manual Rx (last 36 hours)      Manual Treatments     Row Name 02/15/21 0700              Total Minutes    48544 - PT Manual Therapy Minutes  15  -WS         Manual Rx 1    Manual Rx 1 Location  R shoulder PROM flex ( protocol to 140), ER to 20 with ease; did not progress ER per protocol  -      Manual Rx 1 Type  gentle oscillations/ distractions.  GH post glides  -      Manual Rx 1 Duration  15  -WS        User Key  (r) = Recorded By, (t) = Taken By, (c) = Cosigned By    Initials Name Provider Type    Silvano Hinson PTA Physical Therapy Assistant                             Time Calculation:   Start Time: 0730  Stop Time: 0818  Time Calculation (min): 48 min  Therapy Charges for Today     Code Description Service Date Service Provider Modifiers Qty    40937668789 HC PT THER PROC EA 15 MIN 2/15/2021 Silvano Wiggins PTA GP 2    61640944112 HC PT MANUAL THERAPY EA 15 MIN 2/15/2021 Silvano Wiggins PTA GP 1                    Silvano Wiggins PTA  2/15/2021

## 2021-02-17 ENCOUNTER — HOSPITAL ENCOUNTER (OUTPATIENT)
Dept: PHYSICAL THERAPY | Facility: HOSPITAL | Age: 86
Setting detail: THERAPIES SERIES
Discharge: HOME | End: 2021-02-17

## 2021-02-17 DIAGNOSIS — Z47.89 ORTHOPEDIC AFTERCARE: ICD-10-CM

## 2021-02-17 DIAGNOSIS — M25.611 DECREASED RIGHT SHOULDER RANGE OF MOTION: ICD-10-CM

## 2021-02-17 DIAGNOSIS — M25.511 ACUTE PAIN OF RIGHT SHOULDER: Primary | ICD-10-CM

## 2021-02-17 PROCEDURE — 97110 THERAPEUTIC EXERCISES: CPT

## 2021-02-17 PROCEDURE — 97140 MANUAL THERAPY 1/> REGIONS: CPT

## 2021-02-17 NOTE — THERAPY TREATMENT NOTE
Outpatient Physical Therapy Ortho Treatment Note  [facility]     Patient Name: Josue Rothman  : 1935  MRN: [id number]  Today's Date: 2021      Visit Date: 2021    Visit Dx:    ICD-10-CM ICD-9-CM   1. Acute pain of right shoulder  M25.511 719.41   2. Orthopedic aftercare  Z47.89 V54.9   3. Decreased right shoulder range of motion  M25.611 719.51       Patient Active Problem List   Diagnosis   • Shoulder arthritis   • Status post reverse total replacement of left shoulder        Past Medical History:   Diagnosis Date   • History of kidney stones    • History of pneumonia    • History of transfusion     no reaction   • Hyperlipidemia    • Limited range of motion (ROM) of shoulder     right   • Osteoarthritis    • Paroxysmal atrial fibrillation (CMS/HCC)         Past Surgical History:   Procedure Laterality Date   • BACK SURGERY      RODS & SCREWS   • CATARACT EXTRACTION EXTRACAPSULAR W/ INTRAOCULAR LENS IMPLANTATION Bilateral    • COLONOSCOPY     • HERNIA REPAIR Bilateral     2x on left inguinal & 1 time on right inguinal   • KIDNEY STONE SURGERY     • TOTAL SHOULDER ARTHROPLASTY W/ DISTAL CLAVICLE EXCISION Left 9/10/2020    Procedure: TOTAL SHOULDER REVERSE ARTHROPLASTY;  Surgeon: Jayden Holbrook MD;  Location: [facility];  Service: Orthopedics;  Laterality: Left;   • TOTAL SHOULDER ARTHROPLASTY W/ DISTAL CLAVICLE EXCISION Right 2021    Procedure: Right Reverse Total Shoulder Arthroplasty;  Surgeon: Jayden Holbrook MD;  Location: [facility];  Service: Orthopedics;  Laterality: Right;                       PT Assessment/Plan     Row Name 21 1512          PT Assessment    Assessment Comments  Patient approx 5 weeks post op. Pain wel controled. Need to continue work on flexion. Consider mirrow on wall  -       User Key  (r) = Recorded By, (t) = Taken By, (c) = Cosigned By    Initials Name Provider Type    Silvano Hinson PTA Physical Therapy  Assistant            OP Exercises     Row Name 02/17/21 1500 02/17/21 4166          Subjective Comments    Subjective Comments  --  doing exercises at home  -        Subjective Pain    Able to rate subjective pain?  --  yes  -     Pre-Treatment Pain Level  --  0  -     Subjective Pain Comment  --  5 weeks Thursday  -        Total Minutes    53776 - PT Therapeutic Exercise Minutes  --  25  -WS     58683 - PT Manual Therapy Minutes  15  -WS  --        Exercise 1    Exercise Name 1  --  scap retract/post shoulder roll  -WS     Cueing 1  --  Verbal;Demo cues to engage scapular mm's, avoid upper trap compensation  -WS     Reps 1  --  15ea  -WS        Exercise 2    Exercise Name 2  --  elbow AROM flex/ext and pronation/supination  -WS     Cueing 2  --  Verbal  -WS     Sets 2  --  2  -WS     Reps 2  --  10ea  -WS        Exercise 3    Exercise Name 3  --  pendulums  -WS     Time 3  --  30 sec each  -WS        Exercise 4    Exercise Name 4  --  AAROM flexion with dowel and self assist  -WS     Reps 4  --  10  -WS     Additional Comments  --  each  -WS        Exercise 5    Exercise Name 5  --  isometric flex/ext/abd  -WS     Cueing 5  --  Verbal;Demo cues for upright posture, isolation of movement to shoulder  -WS     Reps 5  --  10  -WS     Time 5  --  5 sec  -WS     Additional Comments  --  sub-max  -        Exercise 6    Exercise Name 6  --  Overhead pulleys  -WS     Cueing 6  --  Verbal;Demo  -WS     Reps 6  --  3 min  -WS        Exercise 7    Exercise Name 7  --  Shoulder ER AAROM with dowel (limit to 20 degrees)  -WS     Reps 7  --  10  -WS        Exercise 8    Exercise Name 8  --  chest press dowel  -WS     Reps 8  --  10  -WS     Additional Comments  --  slow reps  -WS        Exercise 9    Exercise Name 9  --  add towel on mirror  -       User Key  (r) = Recorded By, (t) = Taken By, (c) = Cosigned By    Initials Name Provider Type    Silvano Hinson PTA Physical Therapy Assistant                       Manual Rx (last 36 hours)      Manual Treatments     Row Name 02/17/21 1500             Total Minutes    96599 - PT Manual Therapy Minutes  15  -WS         Manual Rx 1    Manual Rx 1 Location  R shoulder PROM flex ( protocol to 140), ER to 20 with ease; did not progress ER per protocol  -      Manual Rx 1 Type  gentle oscillations/ distractions.  GH post glides  -      Manual Rx 1 Duration  15  -WS        User Key  (r) = Recorded By, (t) = Taken By, (c) = Cosigned By    Initials Name Provider Type     Silvano Wiggins PTA Physical Therapy Assistant          PT OP Goals     Row Name 02/17/21 1500          PT Short Term Goals    STG Date to Achieve  02/24/21  -     STG 1  Pt will be compliant with precautions including sling usage and ROM restrictions in order to reduce potential of dislocation.   -     STG 1 Progress  Met  -     STG 2  The pt will demonstrate R shoulder flex  PROM to at least 0-140  to facilitate improved functional reach and dressing.  -     STG 2 Progress  Ongoing  -        Long Term Goals    LTG Date to Achieve  04/04/21  -     LTG 1  The pt will report at least 75% improvement in dressing upper body to facilitate improved self-care ADL performance.  -     LTG 1 Progress  Ongoing  -     LTG 2  The pt will demonstrate IND and compliant with progressive HEP focused on IND condition management and return to PLOF.  -     LTG 2 Progress  Ongoing  -     LTG 3  The pt will score less than or equal to 20% disability on the QuickDASH to indicate improved perceived performance of ADLs.  -     LTG 3 Progress  Ongoing  -     LTG 4  The pt will demonstrate R shoulder  AROM flex to at least 0-130 and Abduction to at least 0-120 to facilitate improved functional reach.  -     LTG 4 Progress  Ongoing  -     LTG 5  The pt will demonstrate R shoulder strength to at least 4-/5 for improved functional reach and household chore performance.  -     LTG 5 Progress  Ongoing  -      LTG 6  The pt will demonstrate FIR R shoulder to at least T10 for improved self-care ADL performance.  -     LTG 6 Progress  Ongoing  -       User Key  (r) = Recorded By, (t) = Taken By, (c) = Cosigned By    Initials Name Provider Type    Silvano Hinson PTA Physical Therapy Assistant          Therapy Education  Given: HEP, Symptoms/condition management, Pain management, Posture/body mechanics  Program: Reinforced  How Provided: Verbal  Provided to: Patient  Level of Understanding: Teach back education performed              Time Calculation:   Start Time: 1430  Stop Time: 1510  Time Calculation (min): 40 min  Therapy Charges for Today     Code Description Service Date Service Provider Modifiers Qty    07059883655 HC PT THER PROC EA 15 MIN 2/17/2021 Silvano Wiggins PTA GP 2    10472288152 HC PT MANUAL THERAPY EA 15 MIN 2/17/2021 Silvano Wiggins PTA GP 1                    Silvano Wiggins PTA  2/17/2021

## 2021-02-22 ENCOUNTER — HOSPITAL ENCOUNTER (OUTPATIENT)
Dept: PHYSICAL THERAPY | Facility: HOSPITAL | Age: 86
Setting detail: THERAPIES SERIES
Discharge: HOME | End: 2021-02-22

## 2021-02-22 DIAGNOSIS — M25.511 ACUTE PAIN OF RIGHT SHOULDER: Primary | ICD-10-CM

## 2021-02-22 DIAGNOSIS — Z47.89 ORTHOPEDIC AFTERCARE: ICD-10-CM

## 2021-02-22 DIAGNOSIS — M25.611 DECREASED RIGHT SHOULDER RANGE OF MOTION: ICD-10-CM

## 2021-02-22 PROCEDURE — 97140 MANUAL THERAPY 1/> REGIONS: CPT

## 2021-02-22 PROCEDURE — 97110 THERAPEUTIC EXERCISES: CPT

## 2021-02-22 NOTE — THERAPY TREATMENT NOTE
Outpatient Physical Therapy Ortho Treatment Note  [facility]     Patient Name: Josue Rothman  : 1935  MRN: [id number]  Today's Date: 2021      Visit Date: 2021    Visit Dx:    ICD-10-CM ICD-9-CM   1. Acute pain of right shoulder  M25.511 719.41   2. Orthopedic aftercare  Z47.89 V54.9   3. Decreased right shoulder range of motion  M25.611 719.51       Patient Active Problem List   Diagnosis   • Shoulder arthritis   • Status post reverse total replacement of left shoulder        Past Medical History:   Diagnosis Date   • History of kidney stones    • History of pneumonia    • History of transfusion     no reaction   • Hyperlipidemia    • Limited range of motion (ROM) of shoulder     right   • Osteoarthritis    • Paroxysmal atrial fibrillation (CMS/HCC)         Past Surgical History:   Procedure Laterality Date   • BACK SURGERY      RODS & SCREWS   • CATARACT EXTRACTION EXTRACAPSULAR W/ INTRAOCULAR LENS IMPLANTATION Bilateral    • COLONOSCOPY     • HERNIA REPAIR Bilateral     2x on left inguinal & 1 time on right inguinal   • KIDNEY STONE SURGERY     • TOTAL SHOULDER ARTHROPLASTY W/ DISTAL CLAVICLE EXCISION Left 9/10/2020    Procedure: TOTAL SHOULDER REVERSE ARTHROPLASTY;  Surgeon: Jayden Holbrook MD;  Location: [facility];  Service: Orthopedics;  Laterality: Left;   • TOTAL SHOULDER ARTHROPLASTY W/ DISTAL CLAVICLE EXCISION Right 2021    Procedure: Right Reverse Total Shoulder Arthroplasty;  Surgeon: Jayden Holbrook MD;  Location: [facility];  Service: Orthopedics;  Laterality: Right;                       PT Assessment/Plan     Row Name 21 0803          PT Assessment    Assessment Comments  Patient s/p 5.5 weeks R rev TSA. Pain well controlled. Added towel on mirror flex  -WS       User Key  (r) = Recorded By, (t) = Taken By, (c) = Cosigned By    Initials Name Provider Type    Silvano Hinson PTA Physical Therapy Assistant            OP Exercises      Row Name 02/22/21 0725 02/22/21 0700          Subjective Pain    Able to rate subjective pain?  yes  -WS  --     Pre-Treatment Pain Level  1  -WS  --        Total Minutes    12135 - PT Therapeutic Exercise Minutes  25  -WS  --     71357 - PT Manual Therapy Minutes  --  15  -WS        Exercise 1    Exercise Name 1  scap retract/post shoulder roll  -WS  --     Cueing 1  Verbal;Demo cues to engage scapular mm's, avoid upper trap compensation  -WS  --     Reps 1  15ea  -WS  --        Exercise 2    Exercise Name 2  elbow AROM flex/ext and pronation/supination  -WS  --     Cueing 2  Verbal  -WS  --     Sets 2  2  -WS  --     Reps 2  10ea  -WS  --        Exercise 3    Exercise Name 3  pendulums  -WS  --     Time 3  30 sec each  -WS  --        Exercise 4    Exercise Name 4  AAROM flexion with dowel and self assist  -WS  --     Reps 4  10  -WS  --     Additional Comments  each  -WS  --        Exercise 5    Exercise Name 5  isometric flex/ext/abd  -WS  --     Cueing 5  Verbal;Demo cues for upright posture, isolation of movement to shoulder  -WS  --     Reps 5  10  -WS  --     Time 5  5 sec  -WS  --     Additional Comments  sub-max  -WS  --        Exercise 6    Exercise Name 6  Overhead pulleys  -WS  --     Cueing 6  Verbal;Demo  -WS  --     Reps 6  3 min  -WS  --        Exercise 7    Exercise Name 7  Shoulder ER AAROM with dowel (limit to 20 degrees)  -WS  --     Reps 7  10  -WS  --        Exercise 8    Exercise Name 8  chest press dowel  -WS  --     Reps 8  10  -WS  --     Additional Comments  slow reps  -WS  --        Exercise 9    Exercise Name 9   towel on mirror  -WS  --     Reps 9  10  -WS  --     Time 9  2 hands  -WS  --       User Key  (r) = Recorded By, (t) = Taken By, (c) = Cosigned By    Initials Name Provider Type    Silvano Hinson PTA Physical Therapy Assistant                      Manual Rx (last 36 hours)      Manual Treatments     Row Name 02/22/21 0700             Total Minutes    57463 - PT Manual  Therapy Minutes  15  -WS         Manual Rx 1    Manual Rx 1 Location  R shoulder PROM flex ( protocol to 140), ER to 20 with ease; did not progress ER per protocol  -      Manual Rx 1 Type  gentle oscillations/ distractions.  GH post glides  -      Manual Rx 1 Duration  15  -WS        User Key  (r) = Recorded By, (t) = Taken By, (c) = Cosigned By    Initials Name Provider Type     Silvano Wiggins PTA Physical Therapy Assistant          PT OP Goals     Row Name 02/22/21 0800          PT Short Term Goals    STG Date to Achieve  02/24/21  -     STG 1  Pt will be compliant with precautions including sling usage and ROM restrictions in order to reduce potential of dislocation.   -     STG 1 Progress  Met  -     STG 2  The pt will demonstrate R shoulder flex  PROM to at least 0-140  to facilitate improved functional reach and dressing.  -     STG 2 Progress  Ongoing  -        Long Term Goals    LTG Date to Achieve  04/04/21  -     LTG 1  The pt will report at least 75% improvement in dressing upper body to facilitate improved self-care ADL performance.  -     LTG 1 Progress  Ongoing  -     LTG 2  The pt will demonstrate IND and compliant with progressive HEP focused on IND condition management and return to PLOF.  -     LTG 2 Progress  Ongoing  -     LTG 3  The pt will score less than or equal to 20% disability on the QuickDASH to indicate improved perceived performance of ADLs.  -     LTG 3 Progress  Ongoing  -     LTG 4  The pt will demonstrate R shoulder  AROM flex to at least 0-130 and Abduction to at least 0-120 to facilitate improved functional reach.  -     LTG 4 Progress  Ongoing  -     LTG 5  The pt will demonstrate R shoulder strength to at least 4-/5 for improved functional reach and household chore performance.  -     LTG 5 Progress  Ongoing  -     LTG 6  The pt will demonstrate FIR R shoulder to at least T10 for improved self-care ADL performance.  -     LTG 6  Progress  Ongoing  -       User Key  (r) = Recorded By, (t) = Taken By, (c) = Cosigned By    Initials Name Provider Type    Silvano Hinson PTA Physical Therapy Assistant          Therapy Education  Given: HEP, Posture/body mechanics, Symptoms/condition management, Pain management  Program: Reinforced  How Provided: Verbal  Provided to: Patient              Time Calculation:   Start Time: 0730  Stop Time: 0810  Time Calculation (min): 40 min  Therapy Charges for Today     Code Description Service Date Service Provider Modifiers Qty    78471516333  PT THER PROC EA 15 MIN 2/22/2021 Silvano Wiggins PTA GP 2    [id number]  PT MANUAL THERAPY EA 15 MIN 2/22/2021 Silvano Wiggins PTA GP 1                    Silvano Wiggins PTA  2/22/2021

## 2021-02-25 ENCOUNTER — HOSPITAL ENCOUNTER (OUTPATIENT)
Dept: PHYSICAL THERAPY | Facility: HOSPITAL | Age: 86
Setting detail: THERAPIES SERIES
Discharge: HOME | End: 2021-02-25

## 2021-02-25 DIAGNOSIS — Z47.89 ORTHOPEDIC AFTERCARE: ICD-10-CM

## 2021-02-25 DIAGNOSIS — M25.611 DECREASED RIGHT SHOULDER RANGE OF MOTION: ICD-10-CM

## 2021-02-25 DIAGNOSIS — M25.511 ACUTE PAIN OF RIGHT SHOULDER: Primary | ICD-10-CM

## 2021-02-25 PROCEDURE — 97110 THERAPEUTIC EXERCISES: CPT | Performed by: PHYSICAL THERAPIST

## 2021-02-25 PROCEDURE — 97140 MANUAL THERAPY 1/> REGIONS: CPT | Performed by: PHYSICAL THERAPIST

## 2021-02-25 NOTE — THERAPY TREATMENT NOTE
Outpatient Physical Therapy Ortho Treatment Note  [facility]     Patient Name: Josue Rothman  : 1935  MRN: [id number]  Today's Date: 2021      Visit Date: 2021    Visit Dx:    ICD-10-CM ICD-9-CM   1. Acute pain of right shoulder  M25.511 719.41   2. Orthopedic aftercare  Z47.89 V54.9   3. Decreased right shoulder range of motion  M25.611 719.51       Patient Active Problem List   Diagnosis   • Shoulder arthritis   • Status post reverse total replacement of left shoulder        Past Medical History:   Diagnosis Date   • History of kidney stones    • History of pneumonia    • History of transfusion     no reaction   • Hyperlipidemia    • Limited range of motion (ROM) of shoulder     right   • Osteoarthritis    • Paroxysmal atrial fibrillation (CMS/HCC)         Past Surgical History:   Procedure Laterality Date   • BACK SURGERY      RODS & SCREWS   • CATARACT EXTRACTION EXTRACAPSULAR W/ INTRAOCULAR LENS IMPLANTATION Bilateral    • COLONOSCOPY     • HERNIA REPAIR Bilateral     2x on left inguinal & 1 time on right inguinal   • KIDNEY STONE SURGERY     • TOTAL SHOULDER ARTHROPLASTY W/ DISTAL CLAVICLE EXCISION Left 9/10/2020    Procedure: TOTAL SHOULDER REVERSE ARTHROPLASTY;  Surgeon: Jayden Holbrook MD;  Location: [facility];  Service: Orthopedics;  Laterality: Left;   • TOTAL SHOULDER ARTHROPLASTY W/ DISTAL CLAVICLE EXCISION Right 2021    Procedure: Right Reverse Total Shoulder Arthroplasty;  Surgeon: Jayden Holbrook MD;  Location: [facility];  Service: Orthopedics;  Laterality: Right;       PT Ortho     Row Name 21 7110       Subjective Comments    Subjective Comments  Feeling good & compliant with HEP.  C/o tightness with attempts of reaching overhead with right.    -JS       Subjective Pain    Able to rate subjective pain?  yes  -JS    Pre-Treatment Pain Level  0  -JS       Right Upper Ext    Rt Shoulder Flexion PROM  135 degrees  -JS    Rt Shoulder  External Rotation PROM  20 degrees  -JS      User Key  (r) = Recorded By, (t) = Taken By, (c) = Cosigned By    Initials Name Provider Type    Yanelis Bates, PT Physical Therapist                      PT Assessment/Plan     Row Name 02/25/21 0830          PT Assessment    Assessment Comments  Pt is 6 weeks post-op R rTSA. Progressing with tightness end-range, but improving PROM into R shoulder flex. Attains 20 degrees R shoulder ER with ease.  Demonstrates proper postural awareness and scapular stabilization with current isometrics, able to hold against mild- mod resistance with rhythmic stabilization. Reviewed current post-operative guidelines and precautions, including avoiding AROM at this time, to address pt's subjective statement that he has difficulty/tightness reaching R arm overhead.  -JS        PT Plan    PT Plan Comments  Continue per R rTSA protocol, progressing to 7 week post-op protocol, including shoulder IR/ER isometrics and progression of AAROM next week  -JS       User Key  (r) = Recorded By, (t) = Taken By, (c) = Cosigned By    Initials Name Provider Type    Yanelis Bates, ALLYN Physical Therapist            OP Exercises     Row Name 02/25/21 0830             Subjective Comments    Subjective Comments  Feeling good & compliant with HEP.  C/o tightness with attempts of reaching overhead with right.    -JS         Subjective Pain    Able to rate subjective pain?  yes  -JS      Pre-Treatment Pain Level  0  -JS         Total Minutes    88022 - PT Therapeutic Exercise Minutes  30  -JS      64178 - PT Manual Therapy Minutes  15  -JS         Exercise 1    Exercise Name 1  scap retract/post shoulder roll  -JS      Cueing 1  Verbal;Demo  -JS      Reps 1  15ea  -JS         Exercise 2    Exercise Name 2  elbow AROM flex/ext and pronation/supination  -JS      Cueing 2  Verbal  -JS      Sets 2  2  -JS      Reps 2  10ea  -JS         Exercise 3    Exercise Name 3  pendulums  -JS      Time 3  30 sec each  -JS          Exercise 4    Exercise Name 4  AAROM flexion with dowel and self assist  -JS      Reps 4  10  -JS      Additional Comments  each  -JS         Exercise 5    Exercise Name 5  isometric flex/ext/abd  -JS      Cueing 5  Verbal;Demo  -JS      Reps 5  10  -JS      Time 5  5 sec  -JS      Additional Comments  sub-max  -JS         Exercise 6    Exercise Name 6  Overhead pulleys  -JS      Cueing 6  Verbal;Demo  -JS      Reps 6  3 min  -JS         Exercise 7    Exercise Name 7  Shoulder ER AAROM with dowel (limit to 20 degrees)  -JS      Reps 7  10  -JS         Exercise 8    Exercise Name 8  chest press dowel  -JS      Reps 8  10  -JS      Additional Comments  slow reps  -JS         Exercise 9    Exercise Name 9   towel on mirror  -JS      Reps 9  10  -JS      Time 9  2 hands  -JS         Exercise 10    Exercise Name 10  Sidelying scapular retraction, emphasizing low trap  -JS      Cueing 10  Verbal;Tactile  -JS      Reps 10  10  -JS        User Key  (r) = Recorded By, (t) = Taken By, (c) = Cosigned By    Initials Name Provider Type    Yanelis Bates, PT Physical Therapist                      Manual Rx (last 36 hours)      Manual Treatments     Row Name 02/25/21 0830 02/25/21 0700          Total Minutes    35290 - PT Manual Therapy Minutes  15  -JS  --        Manual Rx 1    Manual Rx 1 Location  R shoulder PROM flex ( protocol to 140), ER to 20 with ease; did not progress ER per protocol  -JS  --     Manual Rx 1 Type  gentle oscillations/ distractions.  GH inf & post glides  -JS  --     Manual Rx 1 Grade  scapular mobilization of R in L sidelying  -JS  --     Manual Rx 1 Duration  15  -JS  --        Manual Rx 2    Manual Rx 2 Location  Rhythmic stabilization- 90 degrees flex in supine  -JS  --  -JS     Manual Rx 2 Grade  gentle  -JS  --     Manual Rx 2 Duration  30 sec x 5  -JS  --       User Key  (r) = Recorded By, (t) = Taken By, (c) = Cosigned By    Initials Name Provider Type    Yanelis Bates, PT Physical Therapist           PT OP Goals     Row Name 02/25/21 0830          PT Short Term Goals    STG Date to Achieve  02/24/21  -JS     STG 1  Pt will be compliant with precautions including sling usage and ROM restrictions in order to reduce potential of dislocation.   -JS     STG 1 Progress  Met  -     STG 2  The pt will demonstrate R shoulder flex  PROM to at least 0-140  to facilitate improved functional reach and dressing.  -JS     STG 2 Progress  Ongoing  -     STG 2 Progress Comments  PROM R shoulder flex 135  -JS        Long Term Goals    LTG Date to Achieve  04/04/21  -JS     LTG 1  The pt will report at least 75% improvement in dressing upper body to facilitate improved self-care ADL performance.  -JS     LTG 1 Progress  Ongoing  -JS     LTG 2  The pt will demonstrate IND and compliant with progressive HEP focused on IND condition management and return to PLOF.  -JS     LTG 2 Progress  Ongoing  -JS     LTG 3  The pt will score less than or equal to 20% disability on the QuickDASH to indicate improved perceived performance of ADLs.  -JS     LTG 3 Progress  Ongoing  -JS     LTG 4  The pt will demonstrate R shoulder  AROM flex to at least 0-130 and Abduction to at least 0-120 to facilitate improved functional reach.  -JS     LTG 4 Progress  Ongoing  -JS     LTG 5  The pt will demonstrate R shoulder strength to at least 4-/5 for improved functional reach and household chore performance.  -JS     LTG 5 Progress  Ongoing  -JS     LTG 6  The pt will demonstrate FIR R shoulder to at least T10 for improved self-care ADL performance.  -     LTG 6 Progress  Ongoing  -       User Key  (r) = Recorded By, (t) = Taken By, (c) = Cosigned By    Initials Name Provider Type    Yanelis Bates PT Physical Therapist          Therapy Education  Education Details: Reviewed HEP & current post-operative protocol/precautions, including no AROM with R UE, avoidance of shoulder extension & WBing through R UE.  Given: HEP              Time  Calculation:   Start Time: 0830  Stop Time: 0915  Time Calculation (min): 45 min  Therapy Charges for Today     Code Description Service Date Service Provider Modifiers Qty    78242112511  PT THER PROC EA 15 MIN 2/25/2021 Yanelis Tan, PT GP 2    [id number]  PT MANUAL THERAPY EA 15 MIN 2/25/2021 Yanelis Tan, PT GP 1                    Yanelis Tan PT  2/25/2021

## 2021-03-01 ENCOUNTER — OFFICE VISIT (OUTPATIENT)
Dept: ORTHOPEDIC SURGERY | Facility: CLINIC | Age: 86
End: 2021-03-01

## 2021-03-01 VITALS — BODY MASS INDEX: 26.66 KG/M2 | HEIGHT: 69 IN | WEIGHT: 180 LBS | TEMPERATURE: 97.3 F

## 2021-03-01 DIAGNOSIS — Z09 SURGERY FOLLOW-UP: Primary | ICD-10-CM

## 2021-03-01 PROCEDURE — 99024 POSTOP FOLLOW-UP VISIT: CPT | Performed by: ORTHOPAEDIC SURGERY

## 2021-03-01 PROCEDURE — 73030 X-RAY EXAM OF SHOULDER: CPT | Performed by: ORTHOPAEDIC SURGERY

## 2021-03-01 NOTE — PROGRESS NOTES
"Josue Rothman : 1935 MRN: [id number] DATE: 3/1/2021    DIAGNOSIS: 6 week follow up right shoulder arthroplasty      SUBJECTIVE:  Patient returns today for 6 week follow up of right shoulder replacement. Patient reports doing well with no unusual complaints.     OBJECTIVE:    Temp 97.3 °F (36.3 °C)   Ht 175.3 cm (69\")   Wt 81.6 kg (180 lb)   BMI 26.58 kg/m²     Exam: The incision is well healed. No erythema or drainage. Shoulder moves fluidly with pendulums.  Motion is on track per protocol.  The arm is soft and nontender.  Good motor and sensory function.  Palpable distal pulses.     DIAGNOSTIC STUDIES    Xrays: AP and scapular Y views of the right shoulder are ordered and reviewed for evaluation of shoulder replacement.  They demonstrate a well positioned, well aligned replacement without complicating factors noted.  In comparison with previous films there has been no change.    ASSESSMENT: 6 week follow up right shoulder replacement    PLAN:   1.  Continue PT per protocol.  2.  Discontinue sling and begin working on progressing ROM as tolerated.  3.  Counseled patient about appropriate activity modifications and restrictions.  Released to drive at this point.    Jayden Holbrook MD    2021     "

## 2021-03-04 ENCOUNTER — HOSPITAL ENCOUNTER (OUTPATIENT)
Dept: PHYSICAL THERAPY | Facility: HOSPITAL | Age: 86
Setting detail: THERAPIES SERIES
Discharge: HOME | End: 2021-03-04

## 2021-03-04 DIAGNOSIS — M25.511 ACUTE PAIN OF RIGHT SHOULDER: Primary | ICD-10-CM

## 2021-03-04 DIAGNOSIS — Z47.89 ORTHOPEDIC AFTERCARE: ICD-10-CM

## 2021-03-04 DIAGNOSIS — M25.611 DECREASED RIGHT SHOULDER RANGE OF MOTION: ICD-10-CM

## 2021-03-04 PROCEDURE — 97110 THERAPEUTIC EXERCISES: CPT

## 2021-03-04 PROCEDURE — 97140 MANUAL THERAPY 1/> REGIONS: CPT

## 2021-03-04 NOTE — THERAPY TREATMENT NOTE
Outpatient Physical Therapy Ortho Treatment Note  [facility]     Patient Name: Josue Rothman  : 1935  MRN: [id number]  Today's Date: 3/4/2021      Visit Date: 2021    Visit Dx:    ICD-10-CM ICD-9-CM   1. Acute pain of right shoulder  M25.511 719.41   2. Orthopedic aftercare  Z47.89 V54.9   3. Decreased right shoulder range of motion  M25.611 719.51       Patient Active Problem List   Diagnosis   • Shoulder arthritis   • Status post reverse total replacement of left shoulder        Past Medical History:   Diagnosis Date   • History of kidney stones    • History of pneumonia    • History of transfusion     no reaction   • Hyperlipidemia    • Limited range of motion (ROM) of shoulder     right   • Osteoarthritis    • Paroxysmal atrial fibrillation (CMS/HCC)         Past Surgical History:   Procedure Laterality Date   • BACK SURGERY      RODS & SCREWS   • CATARACT EXTRACTION EXTRACAPSULAR W/ INTRAOCULAR LENS IMPLANTATION Bilateral    • COLONOSCOPY     • HERNIA REPAIR Bilateral     2x on left inguinal & 1 time on right inguinal   • KIDNEY STONE SURGERY     • TOTAL SHOULDER ARTHROPLASTY W/ DISTAL CLAVICLE EXCISION Left 9/10/2020    Procedure: TOTAL SHOULDER REVERSE ARTHROPLASTY;  Surgeon: Jayden Holbrook MD;  Location: [facility];  Service: Orthopedics;  Laterality: Left;   • TOTAL SHOULDER ARTHROPLASTY W/ DISTAL CLAVICLE EXCISION Right 2021    Procedure: Right Reverse Total Shoulder Arthroplasty;  Surgeon: Jayden Holbrook MD;  Location: [facility];  Service: Orthopedics;  Laterality: Right;                       PT Assessment/Plan     Row Name 21 0809          PT Assessment    Assessment Comments  Patient s/p right rev TSA. Passive flex to 140 today. Continue to have tight end range  -WS       User Key  (r) = Recorded By, (t) = Taken By, (c) = Cosigned By    Initials Name Provider Type    Silvano Hinson PTA Physical Therapy Assistant            OP  Exercises     Row Name 03/04/21 0725 03/04/21 0700          Subjective Comments    Subjective Comments  MD visit went good  -WS  --        Subjective Pain    Able to rate subjective pain?  yes  -WS  --     Pre-Treatment Pain Level  0  -WS  --        Total Minutes    44726 - PT Therapeutic Exercise Minutes  30  -WS  --     25139 - PT Manual Therapy Minutes  --  15  -WS        Exercise 1    Exercise Name 1  scap retract/post shoulder roll  -WS  --     Cueing 1  Verbal;Demo  -WS  --     Reps 1  20ea  -WS  --     Additional Comments  RTB  -WS  --        Exercise 2    Exercise Name 2  elbow AROM flex/ext and pronation/supination  -WS  --     Cueing 2  Verbal  -WS  --     Sets 2  2  -WS  --     Reps 2  10ea  -WS  --        Exercise 3    Exercise Name 3  pendulums  -WS  --     Time 3  30 sec each  -WS  --        Exercise 4    Exercise Name 4  AAROM flexion with dowel and self assist  -WS  --     Reps 4  10  -WS  --        Exercise 5    Exercise Name 5  5 way isometrics  -WS  --     Cueing 5  Verbal;Demo  -WS  --     Reps 5  10  -WS  --     Time 5  5 sec  -WS  --        Exercise 6    Exercise Name 6  Overhead pulleys  -WS  --     Cueing 6  Verbal;Demo  -WS  --     Reps 6  3 min  -WS  --        Exercise 7    Exercise Name 7  Shoulder ER AAROM with dowel (limit to 20 degrees)  -WS  --     Reps 7  10  -WS  --        Exercise 8    Exercise Name 8  chest press dowel  -WS  --     Reps 8  10  -WS  --        Exercise 9    Exercise Name 9   towel on mirror  -WS  --     Reps 9  15  -WS  --     Time 9  2 hands  -WS  --        Exercise 10    Exercise Name 10  Sidelying scapular retraction, emphasizing low trap  -WS  --     Cueing 10  Verbal;Tactile  -WS  --     Reps 10  10  -WS  --       User Key  (r) = Recorded By, (t) = Taken By, (c) = Cosigned By    Initials Name Provider Type    Silvano Hinson PTA Physical Therapy Assistant                      Manual Rx (last 36 hours)      Manual Treatments     Row Name 03/04/21 0700              Total Minutes    48843 - PT Manual Therapy Minutes  15  -         Manual Rx 1    Manual Rx 1 Location  R shoulder PROM flex ( protocol to 140), ER to 20 with ease; did not progress ER per protocol  -      Manual Rx 1 Type  gentle oscillations/ distractions.  GH inf & post glides  -      Manual Rx 1 Grade  scapular mobilization of R in L sidelying  -         Manual Rx 2    Manual Rx 2 Location  Rhythmic stabilization- 90 degrees flex in supine  -        User Key  (r) = Recorded By, (t) = Taken By, (c) = Cosigned By    Initials Name Provider Type    Silvano Hinson PTA Physical Therapy Assistant          PT OP Goals     Row Name 03/04/21 0800          PT Short Term Goals    STG Date to Achieve  02/24/21  -     STG 1  Pt will be compliant with precautions including sling usage and ROM restrictions in order to reduce potential of dislocation.   -     STG 1 Progress  Met  -     STG 2  The pt will demonstrate R shoulder flex  PROM to at least 0-140  to facilitate improved functional reach and dressing.  -     STG 2 Progress  Met  -     STG 2 Progress Comments  PROM 140 flex  -        Long Term Goals    LTG Date to Achieve  04/04/21  -     LTG 1  The pt will report at least 75% improvement in dressing upper body to facilitate improved self-care ADL performance.  -     LTG 1 Progress  Ongoing  -     LTG 2  The pt will demonstrate IND and compliant with progressive HEP focused on IND condition management and return to PLOF.  -     LTG 2 Progress  Ongoing  -     LTG 3  The pt will score less than or equal to 20% disability on the QuickDASH to indicate improved perceived performance of ADLs.  -     LTG 3 Progress  Ongoing  -     LTG 4  The pt will demonstrate R shoulder  AROM flex to at least 0-130 and Abduction to at least 0-120 to facilitate improved functional reach.  -     LTG 4 Progress  Ongoing  -     LTG 5  The pt will demonstrate R shoulder strength to at least 4-/5  for improved functional reach and household chore performance.  -     LTG 5 Progress  Ongoing  -     LTG 6  The pt will demonstrate FIR R shoulder to at least T10 for improved self-care ADL performance.  -     LTG 6 Progress  Ongoing  -       User Key  (r) = Recorded By, (t) = Taken By, (c) = Cosigned By    Initials Name Provider Type    Silvano Hinson PTA Physical Therapy Assistant                         Time Calculation:   Start Time: 0725  Stop Time: 0805  Time Calculation (min): 40 min  Therapy Charges for Today     Code Description Service Date Service Provider Modifiers Qty    09099942134  PT THER PROC EA 15 MIN 3/4/2021 Silvano Wiggins PTA GP 2    [id number] HC PT MANUAL THERAPY EA 15 MIN 3/4/2021 Silvano Wiggins PTA GP 1                    Silvano Wiggins PTA  3/4/2021

## 2021-03-09 ENCOUNTER — HOSPITAL ENCOUNTER (OUTPATIENT)
Dept: PHYSICAL THERAPY | Facility: HOSPITAL | Age: 86
Setting detail: THERAPIES SERIES
Discharge: HOME | End: 2021-03-09

## 2021-03-09 DIAGNOSIS — Z47.89 ORTHOPEDIC AFTERCARE: ICD-10-CM

## 2021-03-09 DIAGNOSIS — M25.511 ACUTE PAIN OF RIGHT SHOULDER: Primary | ICD-10-CM

## 2021-03-09 DIAGNOSIS — M25.611 DECREASED RIGHT SHOULDER RANGE OF MOTION: ICD-10-CM

## 2021-03-09 PROCEDURE — 97110 THERAPEUTIC EXERCISES: CPT | Performed by: PHYSICAL THERAPIST

## 2021-03-09 PROCEDURE — 97140 MANUAL THERAPY 1/> REGIONS: CPT | Performed by: PHYSICAL THERAPIST

## 2021-03-09 NOTE — THERAPY PROGRESS REPORT/RE-CERT
Outpatient Physical Therapy Ortho Progress Note  [facility]     Patient Name: Josue Rothman  : 1935  MRN: [id number]  Today's Date: 3/9/2021      Visit Date: 2021    Visit Dx:    ICD-10-CM ICD-9-CM   1. Acute pain of right shoulder  M25.511 719.41   2. Orthopedic aftercare  Z47.89 V54.9   3. Decreased right shoulder range of motion  M25.611 719.51       Patient Active Problem List   Diagnosis   • Shoulder arthritis   • Status post reverse total replacement of left shoulder        Past Medical History:   Diagnosis Date   • History of kidney stones    • History of pneumonia    • History of transfusion     no reaction   • Hyperlipidemia    • Limited range of motion (ROM) of shoulder     right   • Osteoarthritis    • Paroxysmal atrial fibrillation (CMS/HCC)         Past Surgical History:   Procedure Laterality Date   • BACK SURGERY      RODS & SCREWS   • CATARACT EXTRACTION EXTRACAPSULAR W/ INTRAOCULAR LENS IMPLANTATION Bilateral    • COLONOSCOPY     • HERNIA REPAIR Bilateral     2x on left inguinal & 1 time on right inguinal   • KIDNEY STONE SURGERY     • TOTAL SHOULDER ARTHROPLASTY W/ DISTAL CLAVICLE EXCISION Left 9/10/2020    Procedure: TOTAL SHOULDER REVERSE ARTHROPLASTY;  Surgeon: Jayden Holbrook MD;  Location: [facility];  Service: Orthopedics;  Laterality: Left;   • TOTAL SHOULDER ARTHROPLASTY W/ DISTAL CLAVICLE EXCISION Right 2021    Procedure: Right Reverse Total Shoulder Arthroplasty;  Surgeon: Jayden Holbrook MD;  Location: [facility];  Service: Orthopedics;  Laterality: Right;       PT Ortho     Row Name 21 0700       Right Upper Ext    Rt Shoulder Abduction AROM  85 seated  -GJ    Rt Shoulder Flexion AROM  95 seated  -GJ    Rt Shoulder Internal Rotation AROM  FIR midline sacrum, with some effort  -      User Key  (r) = Recorded By, (t) = Taken By, (c) = Cosigned By    Initials Name Provider Type    Raj eJwell, PT Physical Therapist                       PT Assessment/Plan     Row Name 03/09/21 0754          PT Assessment    Functional Limitations  Limitation in home management;Limitations in community activities;Performance in leisure activities  -GJ     Impairments  Impaired flexibility;Impaired muscle endurance;Impaired muscle length;Impaired muscle power;Impaired postural alignment;Joint mobility;Muscle strength;Pain;Posture;Range of motion;Poor body mechanics  -GJ     Assessment Comments  Mr. Rothman is a 86 y/o R handed male. He has attended 10 sessions of physical therapy following his R reverses TSA. He is 8 weeks s/p on Thursday (of note, he underwent L reverse TSA in 9/2020).  He reports being 75% better. He has met all STG's and 1 of 6 LTG's. He demonstrates improving AROM of the R shoulder, see ortho section.  Mr. Rothman is progressing as a expected per protocol, and remains a good candidate for skilled physical therapy.  -GJ     Please refer to paper survey for additional self-reported information  Yes  -GJ     Rehab Potential  Excellent  -GJ     Patient/caregiver participated in establishment of treatment plan and goals  Yes  -GJ     Patient would benefit from skilled therapy intervention  Yes  -GJ        PT Plan    PT Frequency  1x/week;2x/week  -GJ     Predicted Duration of Therapy Intervention (PT)  10 visits  -GJ     Planned CPT's?  PT RE-EVAL: 16826;PT THER PROC EA 15 MIN: 83206;PT THER ACT EA 15 MIN: 68058;PT MANUAL THERAPY EA 15 MIN: 26040;PT NEUROMUSC RE-EDUCATION EA 15 MIN: 18138;PT HOT OR COLD PACK TREAT MCARE;PT ELECTRICAL STIM UNATTEND:   -GJ     PT Plan Comments  see referral section for protocol.  Please issue print out of HEP, code is in education section.  progress scapular girdle strength as able, consider rhythmic stabillization of IR/ER and scaption at 110 for improved neuromuscular control, gently  -GJ       User Key  (r) = Recorded By, (t) = Taken By, (c) = Cosigned By    Initials Name Provider Type     Raj Jewell, PT Physical Therapist            OP Exercises     Row Name 03/09/21 0750 03/09/21 0700          Subjective Comments    Subjective Comments  --  I'm about 75% of the way.    -GJ        Total Minutes    77781 - PT Therapeutic Exercise Minutes  25  -GJ  --     20449 - PT Manual Therapy Minutes  15  -GJ  --        Exercise 1    Exercise Name 1  --  scap retract/shoulder ext   -GJ     Cueing 1  --  Verbal;Demo  -GJ     Reps 1  --  20ea  -GJ     Additional Comments  --  RTB  -GJ        Exercise 2    Exercise Name 2  --  biceps curl  -GJ     Sets 2  --  2  -GJ     Reps 2  --  10   -GJ     Additional Comments  --  2#  -GJ        Exercise 4    Exercise Name 4  --  AAROM flexion with dowel and self assist  -GJ     Reps 4  --  10  -GJ     Additional Comments  --  2#, supine  -GJ        Exercise 5    Exercise Name 5  --  5 way isometrics  -GJ     Cueing 5  --  Verbal;Demo  -GJ     Reps 5  --  10  -GJ     Time 5  --  5 sec  -GJ     Additional Comments  --  towel roll under arm   -GJ        Exercise 6    Exercise Name 6  --  Overhead pulleys  -GJ     Reps 6  --  3 min  -GJ        Exercise 8    Exercise Name 8  --  chest press dowel  -GJ     Reps 8  --  15  -GJ     Additional Comments  --  2#  -GJ        Exercise 9    Exercise Name 9  --   towel on mirror  -GJ     Cueing 9  --  Verbal;Demo  -GJ     Reps 9  --  15  -GJ     Time 9  --  2 hands  -GJ     Additional Comments  --  with step toward mirror, alternate feet with each rep  -GJ        Exercise 10    Exercise Name 10  --  Sidelying scapular retraction, emphasizing low trap  -GJ     Cueing 10  --  Verbal;Tactile  -GJ     Reps 10  --  10  -GJ        Exercise 11    Exercise Name 11  --  UBE  -GJ     Time 11  --  4min  -GJ        Exercise 12    Exercise Name 12  --  SL shoulder flexion  -GJ     Cueing 12  --  Verbal;Demo  -GJ     Time 12  --  12  -GJ       User Key  (r) = Recorded By, (t) = Taken By, (c) = Cosigned By    Initials Name Provider Type    GJ  Raj Ray W, PT Physical Therapist                      Manual Rx (last 36 hours)      Manual Treatments     Row Name 03/09/21 0750             Total Minutes    18981 - PT Manual Therapy Minutes  15  -GJ         Manual Rx 1    Manual Rx 1 Location  R shoulder PROM into flexion/ER  -      Manual Rx 1 Type  gentle oscillations/ distractions.  GH inf & post glides  -         Manual Rx 2    Manual Rx 2 Location  Rhythmic stabilization- 90 degrees flex in supine  -        User Key  (r) = Recorded By, (t) = Taken By, (c) = Cosigned By    Initials Name Provider Type    GJ Raj Ray, PT Physical Therapist          PT OP Goals     Row Name 03/09/21 0700          PT Short Term Goals    STG Date to Achieve  02/24/21  -GJ     STG 1  Pt will be compliant with precautions including sling usage and ROM restrictions in order to reduce potential of dislocation.   -GJ     STG 1 Progress  Met  -GJ     STG 2  The pt will demonstrate R shoulder flex  PROM to at least 0-140  to facilitate improved functional reach and dressing.  -GJ     STG 2 Progress  Met  -        Long Term Goals    LTG Date to Achieve  04/04/21  -GJ     LTG 1  The pt will report at least 75% improvement in dressing upper body to facilitate improved self-care ADL performance.  -GJ     LTG 1 Progress  Met  -GJ     LTG 2  The pt will demonstrate IND and compliant with progressive HEP focused on IND condition management and return to PLOF.  -GJ     LTG 2 Progress  Ongoing  -GJ     LTG 3  The pt will score less than or equal to 20% disability on the QuickDASH to indicate improved perceived performance of ADLs.  -GJ     LTG 3 Progress  Ongoing  -GJ     LTG 4  The pt will demonstrate R shoulder  AROM flex to at least 0-130 and Abduction to at least 0-120 to facilitate improved functional reach.  -GJ     LTG 4 Progress  Ongoing  -GJ     LTG 5  The pt will demonstrate R shoulder strength to at least 4-/5 for improved functional reach and household chore  performance.  -     LTG 5 Progress  Ongoing  -     LTG 6  The pt will demonstrate FIR R shoulder to at least T10 for improved self-care ADL performance.  -     LTG 6 Progress  Ongoing  -       User Key  (r) = Recorded By, (t) = Taken By, (c) = Cosigned By    Initials Name Provider Type    Raj Jewell, PT Physical Therapist          Therapy Education  Education Details: Access Code: [email or web address], developed print outs for patient, please issue to him next session, use this code  Given: HEP, Symptoms/condition management, Pain management, Mobility training, Posture/body mechanics  Program: Reinforced, Progressed  How Provided: Verbal, Demonstration  Provided to: Patient  Level of Understanding: Teach back education performed, Verbalized, Demonstrated              Time Calculation:   Start Time: 0747  Stop Time: 0830  Time Calculation (min): 43 min  Therapy Charges for Today     Code Description Service Date Service Provider Modifiers Qty    41902300507  PT THER PROC EA 15 MIN 3/9/2021 Raj Ray, PT GP 2    [id number]  PT MANUAL THERAPY EA 15 MIN 3/9/2021 Raj Ray, PT GP 1                    Raj Ray, PT  3/9/2021

## 2021-03-11 ENCOUNTER — HOSPITAL ENCOUNTER (OUTPATIENT)
Dept: PHYSICAL THERAPY | Facility: HOSPITAL | Age: 86
Setting detail: THERAPIES SERIES
Discharge: HOME | End: 2021-03-11

## 2021-03-11 DIAGNOSIS — Z47.89 ORTHOPEDIC AFTERCARE: ICD-10-CM

## 2021-03-11 DIAGNOSIS — M25.511 ACUTE PAIN OF RIGHT SHOULDER: Primary | ICD-10-CM

## 2021-03-11 DIAGNOSIS — M25.611 DECREASED RIGHT SHOULDER RANGE OF MOTION: ICD-10-CM

## 2021-03-11 PROCEDURE — 97140 MANUAL THERAPY 1/> REGIONS: CPT

## 2021-03-11 PROCEDURE — 97110 THERAPEUTIC EXERCISES: CPT

## 2021-03-15 ENCOUNTER — HOSPITAL ENCOUNTER (OUTPATIENT)
Dept: PHYSICAL THERAPY | Facility: HOSPITAL | Age: 86
Setting detail: THERAPIES SERIES
Discharge: HOME | End: 2021-03-15

## 2021-03-15 DIAGNOSIS — Z47.89 ORTHOPEDIC AFTERCARE: ICD-10-CM

## 2021-03-15 DIAGNOSIS — M25.512 ACUTE PAIN OF LEFT SHOULDER: ICD-10-CM

## 2021-03-15 DIAGNOSIS — M25.511 ACUTE PAIN OF RIGHT SHOULDER: Primary | ICD-10-CM

## 2021-03-15 DIAGNOSIS — M25.611 DECREASED RIGHT SHOULDER RANGE OF MOTION: ICD-10-CM

## 2021-03-15 PROCEDURE — 97110 THERAPEUTIC EXERCISES: CPT

## 2021-03-15 PROCEDURE — 97140 MANUAL THERAPY 1/> REGIONS: CPT

## 2021-03-15 NOTE — THERAPY TREATMENT NOTE
Outpatient Physical Therapy Ortho Treatment Note  [facility]     Patient Name: Josue Rothman  : 1935  MRN: [id number]  Today's Date: 3/15/2021      Visit Date: 03/15/2021    Visit Dx:    ICD-10-CM ICD-9-CM   1. Acute pain of right shoulder  M25.511 719.41   2. Orthopedic aftercare  Z47.89 V54.9   3. Decreased right shoulder range of motion  M25.611 719.51   4. Acute pain of left shoulder  M25.512 719.41       Patient Active Problem List   Diagnosis   • Shoulder arthritis   • Status post reverse total replacement of left shoulder        Past Medical History:   Diagnosis Date   • History of kidney stones    • History of pneumonia    • History of transfusion     no reaction   • Hyperlipidemia    • Limited range of motion (ROM) of shoulder     right   • Osteoarthritis    • Paroxysmal atrial fibrillation (CMS/HCC)         Past Surgical History:   Procedure Laterality Date   • BACK SURGERY      RODS & SCREWS   • CATARACT EXTRACTION EXTRACAPSULAR W/ INTRAOCULAR LENS IMPLANTATION Bilateral    • COLONOSCOPY     • HERNIA REPAIR Bilateral     2x on left inguinal & 1 time on right inguinal   • KIDNEY STONE SURGERY     • TOTAL SHOULDER ARTHROPLASTY W/ DISTAL CLAVICLE EXCISION Left 9/10/2020    Procedure: TOTAL SHOULDER REVERSE ARTHROPLASTY;  Surgeon: Jayden Holbrook MD;  Location: [facility];  Service: Orthopedics;  Laterality: Left;   • TOTAL SHOULDER ARTHROPLASTY W/ DISTAL CLAVICLE EXCISION Right 2021    Procedure: Right Reverse Total Shoulder Arthroplasty;  Surgeon: Jayden Holbrook MD;  Location: [facility];  Service: Orthopedics;  Laterality: Right;                             OP Exercises     Row Name 03/15/21 0729 03/15/21 0700          Subjective Comments    Subjective Comments  shoulder fels good  -WS  --        Total Minutes    02274 - PT Therapeutic Exercise Minutes  25  -WS  --     19580 - PT Manual Therapy Minutes  --  15  -WS        Exercise 1    Exercise Name 1  scap  retract/shoulder ext   -WS  --     Cueing 1  Verbal;Demo  -WS  --     Reps 1  20ea  -WS  --     Additional Comments  RTB  -WS  --        Exercise 2    Exercise Name 2  biceps curl  -WS  --     Sets 2  2  -WS  --     Reps 2  10   -WS  --     Additional Comments  2#  -WS  --        Exercise 4    Exercise Name 4  AAROM flexion with dowel and self assist  -WS  --     Reps 4  10  -WS  --     Additional Comments  2# supine  -WS  --        Exercise 5    Exercise Name 5  5 way isometrics  -WS  --     Reps 5  10  -WS  --     Time 5  5 sec  -WS  --        Exercise 6    Exercise Name 6  Overhead pulleys  -WS  --     Reps 6  3 min  -WS  --        Exercise 8    Exercise Name 8  chest press dowel  -WS  --     Reps 8  15  -WS  --     Additional Comments  2#  -WS  --        Exercise 9    Exercise Name 9   towel on mirror  -WS  --     Cueing 9  Verbal;Demo  -WS  --     Reps 9  15  -WS  --     Time 9  2 hands  -WS  --        Exercise 10    Exercise Name 10  Sidelying scapular retraction, emphasizing low trap  -WS  --     Cueing 10  Verbal;Tactile  -WS  --     Reps 10  10  -WS  --        Exercise 11    Exercise Name 11  UBE  -WS  --     Time 11  4min  -WS  --        Exercise 12    Exercise Name 12  SL shoulder flexion  -WS  --     Time 12  12  -WS  --        Exercise 13    Exercise Name 13  S/L ER 1##  -WS  --     Reps 13  10  -WS  --       User Key  (r) = Recorded By, (t) = Taken By, (c) = Cosigned By    Initials Name Provider Type     Silvano Wiggins PTA Physical Therapy Assistant                      Manual Rx (last 36 hours)      Manual Treatments     Row Name 03/15/21 0700             Total Minutes    85843 - PT Manual Therapy Minutes  15  -WS         Manual Rx 1    Manual Rx 1 Location  R shoulder PROM into flexion/ER  -WS      Manual Rx 1 Type  gentle oscillations/ distractions.  GH inf & post glides  -WS         Manual Rx 2    Manual Rx 2 Location  Rhythmic stabilization- 110 degrees flex in supine  -WS      Manual Rx 2  Type  ER/IR  -WS        User Key  (r) = Recorded By, (t) = Taken By, (c) = Cosigned By    Initials Name Provider Type    Silvano Hinson PTA Physical Therapy Assistant          PT OP Goals     Row Name 03/15/21 0800          PT Short Term Goals    STG Date to Achieve  02/24/21  -WS     STG 1  Pt will be compliant with precautions including sling usage and ROM restrictions in order to reduce potential of dislocation.   -     STG 1 Progress  Met  -     STG 2  The pt will demonstrate R shoulder flex  PROM to at least 0-140  to facilitate improved functional reach and dressing.  -     STG 2 Progress  Met  -        Long Term Goals    LTG Date to Achieve  04/04/21  -     LTG 1  The pt will report at least 75% improvement in dressing upper body to facilitate improved self-care ADL performance.  -     LTG 1 Progress  Met  -     LTG 2  The pt will demonstrate IND and compliant with progressive HEP focused on IND condition management and return to PLOF.  -     LTG 2 Progress  Ongoing  -     LTG 3  The pt will score less than or equal to 20% disability on the QuickDASH to indicate improved perceived performance of ADLs.  -     LTG 3 Progress  Ongoing  -     LTG 4  The pt will demonstrate R shoulder  AROM flex to at least 0-130 and Abduction to at least 0-120 to facilitate improved functional reach.  -     LTG 4 Progress  Ongoing  -     LTG 5  The pt will demonstrate R shoulder strength to at least 4-/5 for improved functional reach and household chore performance.  -     LTG 5 Progress  Ongoing  -     LTG 6  The pt will demonstrate FIR R shoulder to at least T10 for improved self-care ADL performance.  -     LTG 6 Progress  Ongoing  -       User Key  (r) = Recorded By, (t) = Taken By, (c) = Cosigned By    Initials Name Provider Type    Silvano Hinson PTA Physical Therapy Assistant          Therapy Education  Education Details: updated HEP  Given: HEP  Program: Reinforced  How  Provided: Verbal  Provided to: Patient              Time Calculation:   Start Time: 0730  Stop Time: 0815  Time Calculation (min): 45 min  Therapy Charges for Today     Code Description Service Date Service Provider Modifiers Qty    [id number]  PT THER PROC EA 15 MIN 3/15/2021 Silvano Wiggins PTA GP 2    [id number]  PT MANUAL THERAPY EA 15 MIN 3/15/2021 Silvano Wiggins PTA GP 1                    Silvano Wiggins PTA  3/15/2021

## 2021-03-17 ENCOUNTER — HOSPITAL ENCOUNTER (OUTPATIENT)
Dept: PHYSICAL THERAPY | Facility: HOSPITAL | Age: 86
Setting detail: THERAPIES SERIES
Discharge: HOME | End: 2021-03-17

## 2021-03-17 DIAGNOSIS — M25.511 ACUTE PAIN OF RIGHT SHOULDER: Primary | ICD-10-CM

## 2021-03-17 DIAGNOSIS — M25.512 ACUTE PAIN OF LEFT SHOULDER: ICD-10-CM

## 2021-03-17 DIAGNOSIS — Z47.89 ORTHOPEDIC AFTERCARE: ICD-10-CM

## 2021-03-17 DIAGNOSIS — M25.611 DECREASED RIGHT SHOULDER RANGE OF MOTION: ICD-10-CM

## 2021-03-17 PROCEDURE — 97110 THERAPEUTIC EXERCISES: CPT | Performed by: PHYSICAL THERAPIST

## 2021-03-17 PROCEDURE — 97140 MANUAL THERAPY 1/> REGIONS: CPT | Performed by: PHYSICAL THERAPIST

## 2021-03-17 NOTE — THERAPY TREATMENT NOTE
Outpatient Physical Therapy Ortho Treatment Note  [facility]     Patient Name: Josue Rothman  : 1935  MRN: [id number]  Today's Date: 3/17/2021      Visit Date: 2021    Visit Dx:    ICD-10-CM ICD-9-CM   1. Acute pain of right shoulder  M25.511 719.41   2. Orthopedic aftercare  Z47.89 V54.9   3. Decreased right shoulder range of motion  M25.611 719.51   4. Acute pain of left shoulder  M25.512 719.41       Patient Active Problem List   Diagnosis   • Shoulder arthritis   • Status post reverse total replacement of left shoulder        Past Medical History:   Diagnosis Date   • History of kidney stones    • History of pneumonia    • History of transfusion     no reaction   • Hyperlipidemia    • Limited range of motion (ROM) of shoulder     right   • Osteoarthritis    • Paroxysmal atrial fibrillation (CMS/HCC)         Past Surgical History:   Procedure Laterality Date   • BACK SURGERY      RODS & SCREWS   • CATARACT EXTRACTION EXTRACAPSULAR W/ INTRAOCULAR LENS IMPLANTATION Bilateral    • COLONOSCOPY     • HERNIA REPAIR Bilateral     2x on left inguinal & 1 time on right inguinal   • KIDNEY STONE SURGERY     • TOTAL SHOULDER ARTHROPLASTY W/ DISTAL CLAVICLE EXCISION Left 9/10/2020    Procedure: TOTAL SHOULDER REVERSE ARTHROPLASTY;  Surgeon: Jayden Holbrook MD;  Location: [facility];  Service: Orthopedics;  Laterality: Left;   • TOTAL SHOULDER ARTHROPLASTY W/ DISTAL CLAVICLE EXCISION Right 2021    Procedure: Right Reverse Total Shoulder Arthroplasty;  Surgeon: Jayden Holbrook MD;  Location: [facility];  Service: Orthopedics;  Laterality: Right;       PT Ortho     Row Name 21 0900       Right Upper Ext    Rt Shoulder Flexion PROM  140  -JS    Row Name 21 0830       Subjective Comments    Subjective Comments  Denies shoulder pain.  -JS       Subjective Pain    Able to rate subjective pain?  yes  -JS    Pre-Treatment Pain Level  0  -JS      User Key  (r) = Recorded  By, (t) = Taken By, (c) = Cosigned By    Initials Name Provider Type    Yanelis Bates, PT Physical Therapist                      PT Assessment/Plan     Row Name 03/17/21 0830          PT Assessment    Assessment Comments  Pt is approximately 9 weeks post op R rTSA. Remains painfree. Able to progress resistance with scapular strengthening to GTB & advance initial strengthening/AROM exercises per protocol without increased symptoms. R shoulder ER weakness evident during exercises, with cueing required to avoid shoulder elevation compensation.  -JS        PT Plan    PT Plan Comments  Pt scheduled x2 more visits then will be going out of town to [facility] x 2 weeks. Scheduled to follow-up with PT upon return.  -JS       User Key  (r) = Recorded By, (t) = Taken By, (c) = Cosigned By    Initials Name Provider Type    Yanelis Bates, PT Physical Therapist            OP Exercises     Row Name 03/17/21 0830             Subjective Comments    Subjective Comments  Denies shoulder pain.  -JS         Subjective Pain    Able to rate subjective pain?  yes  -JS      Pre-Treatment Pain Level  0  -JS         Total Minutes    87982 - PT Therapeutic Exercise Minutes  30  -JS      11085 - PT Manual Therapy Minutes  15  -JS         Exercise 1    Exercise Name 1  scap retract/shoulder ext   -JS      Cueing 1  Verbal;Demo  -JS      Sets 1  2  -JS      Reps 1  10  -JS      Additional Comments  GTB  -JS         Exercise 2    Exercise Name 2  biceps curl  -JS      Sets 2  2  -JS      Reps 2  10   -JS      Additional Comments  2#  -JS         Exercise 4    Exercise Name 4  AAROM flexion with dowel and self assist  -JS      Reps 4  10  -JS      Additional Comments  2# supine  -JS         Exercise 5    Exercise Name 5  5 way isometrics  -JS      Additional Comments  HEP  -JS         Exercise 6    Exercise Name 6  Overhead pulleys  -JS      Reps 6  3 min  -JS         Exercise 8    Exercise Name 8  chest press dowel  -JS      Reps 8  15   -JS      Additional Comments  2#  -JS         Exercise 9    Exercise Name 9   towel on mirror  -JS      Cueing 9  Verbal  -JS      Reps 9  15  -JS      Time 9  2 hands  -JS         Exercise 10    Exercise Name 10  Sidelying scapular retraction, emphasizing low trap  -JS      Cueing 10  Verbal;Tactile  -JS      Reps 10  10  -JS         Exercise 11    Exercise Name 11  UBE  -JS      Time 11  4min  -JS         Exercise 12    Exercise Name 12  SL shoulder flexion  -JS      Time 12  12  -JS         Exercise 13    Exercise Name 13  S/L ER 1##  -JS      Sets 13  2  -JS      Reps 13  10  -JS         Exercise 14    Exercise Name 14  Theraband IR/ER  -JS      Cueing 14  Verbal;Demo Cues to avoid shoulder elevation compensation  -JS      Reps 14  10  -JS      Additional Comments  YTB  -JS         Exercise 15    Exercise Name 15  Scaption to 90 degrees (standing)  -JS      Cueing 15  Verbal;Demo  -JS      Reps 15  10  -JS        User Key  (r) = Recorded By, (t) = Taken By, (c) = Cosigned By    Initials Name Provider Type    JS Yanelis Tan, PT Physical Therapist                      Manual Rx (last 36 hours)      Manual Treatments     Row Name 03/17/21 0830             Total Minutes    07798 - PT Manual Therapy Minutes  15  -JS         Manual Rx 1    Manual Rx 1 Location  R shoulder PROM into flexion/ER  -JS      Manual Rx 1 Type  gentle oscillations/ distractions.  GH inf & post glides  -JS         Manual Rx 2    Manual Rx 2 Location  Rhythmic stabilization- 110 degrees flex in supine  -JS      Manual Rx 2 Type  ER/IR  -JS        User Key  (r) = Recorded By, (t) = Taken By, (c) = Cosigned By    Initials Name Provider Type    Yanelis Bates, PT Physical Therapist          PT OP Goals     Row Name 03/17/21 0830          PT Short Term Goals    STG Date to Achieve  02/24/21  -JS     STG 1  Pt will be compliant with precautions including sling usage and ROM restrictions in order to reduce potential of dislocation.   -JS     STG 1  Progress  Met  -     STG 2  The pt will demonstrate R shoulder flex  PROM to at least 0-140  to facilitate improved functional reach and dressing.  -     STG 2 Progress  Met  -        Long Term Goals    LTG Date to Achieve  04/04/21  -JS     LTG 1  The pt will report at least 75% improvement in dressing upper body to facilitate improved self-care ADL performance.  -JS     LTG 1 Progress  Met  -JS     LTG 2  The pt will demonstrate IND and compliant with progressive HEP focused on IND condition management and return to PLOF.  -JS     LTG 2 Progress  Ongoing  -JS     LTG 3  The pt will score less than or equal to 20% disability on the QuickDASH to indicate improved perceived performance of ADLs.  -JS     LTG 3 Progress  Ongoing  -     LTG 4  The pt will demonstrate R shoulder  AROM flex to at least 0-130 and Abduction to at least 0-120 to facilitate improved functional reach.  -     LTG 4 Progress  Ongoing  -     LTG 5  The pt will demonstrate R shoulder strength to at least 4-/5 for improved functional reach and household chore performance.  -     LTG 5 Progress  Ongoing  -     LTG 6  The pt will demonstrate FIR R shoulder to at least T10 for improved self-care ADL performance.  -     LTG 6 Progress  Ongoing  -       User Key  (r) = Recorded By, (t) = Taken By, (c) = Cosigned By    Initials Name Provider Type    Yanelis Bates PT Physical Therapist                         Time Calculation:   Start Time: 0830  Stop Time: 0915  Time Calculation (min): 45 min  Therapy Charges for Today     Code Description Service Date Service Provider Modifiers Qty    60442887222 HC PT THER PROC EA 15 MIN 3/17/2021 Yanelis Tan PT GP 2    70728961789 HC PT MANUAL THERAPY EA 15 MIN 3/17/2021 Yanelis Tan PT GP 1                    Yanelis Tan PT  3/17/2021

## 2021-03-22 ENCOUNTER — HOSPITAL ENCOUNTER (OUTPATIENT)
Dept: PHYSICAL THERAPY | Facility: HOSPITAL | Age: 86
Setting detail: THERAPIES SERIES
Discharge: HOME | End: 2021-03-22

## 2021-03-22 DIAGNOSIS — Z47.89 ORTHOPEDIC AFTERCARE: ICD-10-CM

## 2021-03-22 DIAGNOSIS — M25.512 ACUTE PAIN OF LEFT SHOULDER: ICD-10-CM

## 2021-03-22 DIAGNOSIS — M25.511 ACUTE PAIN OF RIGHT SHOULDER: Primary | ICD-10-CM

## 2021-03-22 DIAGNOSIS — M25.611 DECREASED RIGHT SHOULDER RANGE OF MOTION: ICD-10-CM

## 2021-03-22 PROCEDURE — 97140 MANUAL THERAPY 1/> REGIONS: CPT

## 2021-03-22 PROCEDURE — 97110 THERAPEUTIC EXERCISES: CPT

## 2021-03-22 NOTE — THERAPY TREATMENT NOTE
Outpatient Physical Therapy Ortho Treatment Note  [facility]     Patient Name: Josue Rothman  : 1935  MRN: [id number]  Today's Date: 3/22/2021      Visit Date: 2021    Visit Dx:    ICD-10-CM ICD-9-CM   1. Acute pain of right shoulder  M25.511 719.41   2. Orthopedic aftercare  Z47.89 V54.9   3. Decreased right shoulder range of motion  M25.611 719.51   4. Acute pain of left shoulder  M25.512 719.41       Patient Active Problem List   Diagnosis   • Shoulder arthritis   • Status post reverse total replacement of left shoulder        Past Medical History:   Diagnosis Date   • History of kidney stones    • History of pneumonia    • History of transfusion     no reaction   • Hyperlipidemia    • Limited range of motion (ROM) of shoulder     right   • Osteoarthritis    • Paroxysmal atrial fibrillation (CMS/HCC)         Past Surgical History:   Procedure Laterality Date   • BACK SURGERY      RODS & SCREWS   • CATARACT EXTRACTION EXTRACAPSULAR W/ INTRAOCULAR LENS IMPLANTATION Bilateral    • COLONOSCOPY     • HERNIA REPAIR Bilateral     2x on left inguinal & 1 time on right inguinal   • KIDNEY STONE SURGERY     • TOTAL SHOULDER ARTHROPLASTY W/ DISTAL CLAVICLE EXCISION Left 9/10/2020    Procedure: TOTAL SHOULDER REVERSE ARTHROPLASTY;  Surgeon: Jayden Holbrook MD;  Location: [facility];  Service: Orthopedics;  Laterality: Left;   • TOTAL SHOULDER ARTHROPLASTY W/ DISTAL CLAVICLE EXCISION Right 2021    Procedure: Right Reverse Total Shoulder Arthroplasty;  Surgeon: Jayden Holbrook MD;  Location: [facility];  Service: Orthopedics;  Laterality: Right;                       PT Assessment/Plan     Row Name 21 0833          PT Assessment    Assessment Comments  Continue work on right shoulder ROM/functional strength  -CORI       User Key  (r) = Recorded By, (t) = Taken By, (c) = Cosigned By    Initials Name Provider Type    Silvano Hinson PTA Physical Therapy Assistant             OP Exercises     Row Name 03/22/21 0730 03/22/21 0700          Subjective Comments    Subjective Comments  [place] on thursday  -WS  --        Subjective Pain    Able to rate subjective pain?  yes  -WS  --     Pre-Treatment Pain Level  0  -WS  --        Total Minutes    69470 - PT Therapeutic Exercise Minutes  30  -WS  --     54915 - PT Manual Therapy Minutes  --  15  -WS        Exercise 1    Exercise Name 1  scap retract/shoulder ext   -WS  --     Cueing 1  Verbal;Demo  -WS  --     Sets 1  2  -WS  --     Reps 1  10  -WS  --     Additional Comments  GTB  -WS  --        Exercise 2    Exercise Name 2  biceps curl  -WS  --     Sets 2  2  -WS  --     Reps 2  10   -WS  --     Additional Comments  2#  -WS  --        Exercise 4    Exercise Name 4  AAROM flexion with dowel and self assist  -WS  --     Reps 4  10  -WS  --     Additional Comments  2# supine  -WS  --        Exercise 5    Exercise Name 5  5 way isometrics  -WS  --     Additional Comments  HEP  -WS  --        Exercise 6    Exercise Name 6  Overhead pulleys  -WS  --     Reps 6  3 min  -WS  --        Exercise 8    Exercise Name 8  chest press dowel  -WS  --     Reps 8  15  -WS  --     Additional Comments  2#  -WS  --        Exercise 9    Exercise Name 9   towel on mirror  -WS  --     Cueing 9  Verbal  -WS  --     Reps 9  15  -WS  --     Time 9  2 hands  -WS  --        Exercise 10    Exercise Name 10  Sidelying scapular retraction, emphasizing low trap  -WS  --     Cueing 10  Verbal;Tactile  -WS  --     Reps 10  10  -WS  --        Exercise 11    Exercise Name 11  UBE  -WS  --     Time 11  4min  -WS  --        Exercise 12    Exercise Name 12  SL shoulder flexion  -WS  --     Time 12  12  -WS  --        Exercise 13    Exercise Name 13  S/L ER 1##  -WS  --     Sets 13  2  -WS  --     Reps 13  10  -WS  --        Exercise 14    Exercise Name 14  Theraband IR/ER  -WS  --     Cueing 14  Verbal;Demo Cues to avoid shoulder elevation compensation  -WS  --     Sets 14   2  -WS  --     Reps 14  10  -WS  --     Additional Comments  YTB  -  --        Exercise 15    Exercise Name 15  Scaption to 90 degrees (standing)  -WS  --     Cueing 15  Verbal;Demo  -  --     Reps 15  10  -WS  --       User Key  (r) = Recorded By, (t) = Taken By, (c) = Cosigned By    Initials Name Provider Type     Silvano Wiggins PTA Physical Therapy Assistant                      Manual Rx (last 36 hours)      Manual Treatments     Row Name 03/22/21 0700             Total Minutes    05912 - PT Manual Therapy Minutes  15  -WS         Manual Rx 1    Manual Rx 1 Location  R shoulder PROM into flexion/ER  -      Manual Rx 1 Type  gentle oscillations/ distractions.  GH inf & post glides  -         Manual Rx 2    Manual Rx 2 Location  Rhythmic stabilization- 110 degrees flex in supine  -      Manual Rx 2 Type  ER/IR  -WS        User Key  (r) = Recorded By, (t) = Taken By, (c) = Cosigned By    Initials Name Provider Type     Silvano Wiggins PTA Physical Therapy Assistant          PT OP Goals     Row Name 03/22/21 0800          PT Short Term Goals    STG Date to Achieve  02/24/21  -     STG 1  Pt will be compliant with precautions including sling usage and ROM restrictions in order to reduce potential of dislocation.   -     STG 1 Progress  Met  -     STG 2  The pt will demonstrate R shoulder flex  PROM to at least 0-140  to facilitate improved functional reach and dressing.  -     STG 2 Progress  Met  -        Long Term Goals    LTG Date to Achieve  04/04/21  -     LTG 1  The pt will report at least 75% improvement in dressing upper body to facilitate improved self-care ADL performance.  -     LTG 1 Progress  Met  -     LTG 2  The pt will demonstrate IND and compliant with progressive HEP focused on IND condition management and return to PLOF.  -     LTG 2 Progress  Ongoing  -     LTG 3  The pt will score less than or equal to 20% disability on the QuickDASH to indicate improved  perceived performance of ADLs.  -     LTG 3 Progress  Ongoing  -     LTG 4  The pt will demonstrate R shoulder  AROM flex to at least 0-130 and Abduction to at least 0-120 to facilitate improved functional reach.  -WS     LTG 4 Progress  Ongoing  -     LTG 5  The pt will demonstrate R shoulder strength to at least 4-/5 for improved functional reach and household chore performance.  -     LTG 5 Progress  Ongoing  -     LTG 6  The pt will demonstrate FIR R shoulder to at least T10 for improved self-care ADL performance.  -     LTG 6 Progress  Ongoing  -       User Key  (r) = Recorded By, (t) = Taken By, (c) = Cosigned By    Initials Name Provider Type    Silvano Hinson PTA Physical Therapy Assistant                         Time Calculation:   Start Time: 0730  Stop Time: 0815  Time Calculation (min): 45 min  Therapy Charges for Today     Code Description Service Date Service Provider Modifiers Qty    59144461393  PT THER PROC EA 15 MIN 3/22/2021 Silvano Wiggins PTA GP 2    [id number]  PT MANUAL THERAPY EA 15 MIN 3/22/2021 Silvano Wiggins PTA GP 1                    Silvano Wiggins PTA  3/22/2021

## 2021-03-24 ENCOUNTER — HOSPITAL ENCOUNTER (OUTPATIENT)
Dept: PHYSICAL THERAPY | Facility: HOSPITAL | Age: 86
Setting detail: THERAPIES SERIES
Discharge: HOME | End: 2021-03-24

## 2021-03-24 DIAGNOSIS — M25.511 ACUTE PAIN OF RIGHT SHOULDER: Primary | ICD-10-CM

## 2021-03-24 DIAGNOSIS — M25.611 DECREASED RIGHT SHOULDER RANGE OF MOTION: ICD-10-CM

## 2021-03-24 DIAGNOSIS — Z47.89 ORTHOPEDIC AFTERCARE: ICD-10-CM

## 2021-03-24 PROCEDURE — 97110 THERAPEUTIC EXERCISES: CPT

## 2021-03-24 PROCEDURE — 97140 MANUAL THERAPY 1/> REGIONS: CPT

## 2021-03-24 NOTE — THERAPY TREATMENT NOTE
Outpatient Physical Therapy Ortho Treatment Note  [facility]     Patient Name: Josue Rothman  : 1935  MRN: [id number]  Today's Date: 3/24/2021      Visit Date: 2021    Visit Dx:    ICD-10-CM ICD-9-CM   1. Acute pain of right shoulder  M25.511 719.41   2. Orthopedic aftercare  Z47.89 V54.9   3. Decreased right shoulder range of motion  M25.611 719.51       Patient Active Problem List   Diagnosis   • Shoulder arthritis   • Status post reverse total replacement of left shoulder        Past Medical History:   Diagnosis Date   • History of kidney stones    • History of pneumonia    • History of transfusion     no reaction   • Hyperlipidemia    • Limited range of motion (ROM) of shoulder     right   • Osteoarthritis    • Paroxysmal atrial fibrillation (CMS/HCC)         Past Surgical History:   Procedure Laterality Date   • BACK SURGERY      RODS & SCREWS   • CATARACT EXTRACTION EXTRACAPSULAR W/ INTRAOCULAR LENS IMPLANTATION Bilateral    • COLONOSCOPY     • HERNIA REPAIR Bilateral     2x on left inguinal & 1 time on right inguinal   • KIDNEY STONE SURGERY     • TOTAL SHOULDER ARTHROPLASTY W/ DISTAL CLAVICLE EXCISION Left 9/10/2020    Procedure: TOTAL SHOULDER REVERSE ARTHROPLASTY;  Surgeon: Jayden Holbrook MD;  Location: [facility];  Service: Orthopedics;  Laterality: Left;   • TOTAL SHOULDER ARTHROPLASTY W/ DISTAL CLAVICLE EXCISION Right 2021    Procedure: Right Reverse Total Shoulder Arthroplasty;  Surgeon: Jayden Holbrook MD;  Location: [facility];  Service: Orthopedics;  Laterality: Right;                       PT Assessment/Plan     Row Name 21 1000          PT Assessment    Assessment Comments  Patient presents to therapy with no complaints of pain and states good complaince with HEP. Progressed POC via addition of SA punches for scapular stability, increased resistance for improved strengthening, and rhythmic stabilization for increased neuromuscular  "control. Patient will be leaving for [place] for two weeks, education on HEP and activity while on vacation  -GJ (r) AB (t) GJ (c)        PT Plan    PT Plan Comments  assess patients progress/status upon return from vacation. Progress per protocol if patient is appropriate.   -GJ (r) AB (t) GJ (c)       User Key  (r) = Recorded By, (t) = Taken By, (c) = Cosigned By    Initials Name Provider Type    GJ Raj Ray, PT Physical Therapist    AB Daren Rey, PT Student PT Student            OP Exercises     Row Name 03/24/21 0900 03/24/21 0800          Subjective Comments    Subjective Comments  --  No complaints, \"never had a bad day in my life\". leaving [facility] for [place].  -GJ (r) AB (t) GJ (c)        Subjective Pain    Able to rate subjective pain?  --  yes  -GJ (r) AB (t) GJ (c)     Pre-Treatment Pain Level  --  0  -GJ (r) AB (t) GJ (c)        Total Minutes    47798 - PT Therapeutic Exercise Minutes  --  30  -GJ (r) AB (t) GJ (c)     55646 - PT Manual Therapy Minutes  10  -GJ (r) AB (t) GJ (c)  --        Exercise 1    Exercise Name 1  --  scap retract/shoulder ext   -GJ (r) AB (t) GJ (c)     Cueing 1  --  Verbal;Demo  -GJ (r) AB (t) GJ (c)     Sets 1  --  2  -GJ (r) AB (t) GJ (c)     Reps 1  --  10  -GJ (r) AB (t) GJ (c)     Additional Comments  --  GTB  -GJ (r) AB (t) GJ (c)        Exercise 2    Exercise Name 2  --  --  -GJ (r) AB (t) GJ (c)     Sets 2  --  --  -GJ (r) AB (t) GJ (c)     Reps 2  --  --  -GJ (r) AB (t) GJ (c)        Exercise 4    Exercise Name 4  --  AAROM flexion with dowel and self assist  -GJ (r) AB (t) GJ (c)     Reps 4  --  10  -GJ (r) AB (t) GJ (c)        Exercise 5    Exercise Name 5  --  --  -GJ (r) AB (t) GJ (c)        Exercise 6    Exercise Name 6  --  Overhead pulleys  -GJ (r) AB (t) GJ (c)     Reps 6  --  3 min  -GJ (r) AB (t) GJ (c)     Additional Comments  --  flexion/scaption  -GJ (r) AB (t) GJ (c)        Exercise 8    Exercise Name 8  --  chest press dowel  -GJ (r) AB (t) " GJ (c)     Reps 8  --  15  -GJ (r) AB (t) GJ (c)     Additional Comments  --  5#  -GJ (r) AB (t) GJ (c)        Exercise 9    Exercise Name 9  --   towel on mirror  -GJ (r) AB (t) GJ (c)     Cueing 9  --  Verbal  -GJ (r) AB (t) GJ (c)     Reps 9  --  15  -GJ (r) AB (t) GJ (c)     Time 9  --  2 hands  -GJ (r) AB (t) GJ (c)        Exercise 10    Exercise Name 10  --  --  -GJ (r) AB (t) GJ (c)     Cueing 10  --  --  -GJ (r) AB (t) GJ (c)     Reps 10  --  --  -GJ (r) AB (t) GJ (c)        Exercise 11    Exercise Name 11  --  UBE  -GJ (r) AB (t) GJ (c)     Time 11  --  4min  -GJ (r) AB (t) GJ (c)        Exercise 12    Exercise Name 12  --  SL shoulder flexion  -GJ (r) AB (t) GJ (c)     Time 12  --  12  -GJ (r) AB (t) GJ (c)        Exercise 13    Exercise Name 13  --  S/L ER 2#  -GJ (r) AB (t) GJ (c)     Sets 13  --  2  -GJ (r) AB (t) GJ (c)     Reps 13  --  10  -GJ (r) AB (t) GJ (c)        Exercise 14    Exercise Name 14  --  Theraband IR/ER  -GJ (r) AB (t) GJ (c)     Cueing 14  --  Verbal;Demo Cues to avoid shoulder elevation compensation  -GJ (r) AB (t) GJ (c)     Sets 14  --  2  -GJ (r) AB (t) GJ (c)     Reps 14  --  10  -GJ (r) AB (t) GJ (c)     Additional Comments  --  YTB walk outs  -GJ (r) AB (t) GJ (c)        Exercise 15    Exercise Name 15  --  flexion/Scaption to 90 degrees (standing)  -GJ (r) AB (t) GJ (c)     Cueing 15  --  Verbal;Demo  -GJ (r) AB (t) GJ (c)     Reps 15  --  10  -GJ (r) AB (t) GJ (c)        Exercise 16    Exercise Name 16  --  Supine RUE alphabet  -GJ (r) AB (t) GJ (c)     Sets 16  --  2x  -GJ (r) AB (t) GJ (c)        Exercise 17    Exercise Name 17  --  Supine SAP  -GJ (r) AB (t) GJ (c)     Cueing 17  --  Verbal  -GJ (r) AB (t) GJ (c)     Sets 17  --  2  -GJ (r) AB (t) GJ (c)     Reps 17  --  10  -GJ (r) AB (t) GJ (c)     Additional Comments  --  2#  -GJ (r) AB (t) GJ (c)       User Key  (r) = Recorded By, (t) = Taken By, (c) = Cosigned By    Initials Name Provider Type    Raj Jewell,  PT Physical Therapist    Daren Thacker, PT Student PT Student                      Manual Rx (last 36 hours)      Manual Treatments     Row Name 03/24/21 0900             Total Minutes    04136 - PT Manual Therapy Minutes  10  -GJ (r) AB (t) GJ (c)         Manual Rx 1    Manual Rx 1 Location  R shoulder PROM into flexion/ER  -GJ (r) AB (t) GJ (c)      Manual Rx 1 Type  gentle oscillations/ distractions.   -GJ (r) AB (t) GJ (c)         Manual Rx 2    Manual Rx 2 Location  Rhythmic stabilization-  degrees flex in supine  -GJ (r) AB (t) GJ (c)        User Key  (r) = Recorded By, (t) = Taken By, (c) = Cosigned By    Initials Name Provider Type    GJ Raj Ray, PT Physical Therapist    Daren Thacker, PT Student PT Student          PT OP Goals     Row Name 03/24/21 1100          PT Short Term Goals    STG Date to Achieve  02/24/21  -GJ (r) AB (t) GJ (c)     STG 1  Pt will be compliant with precautions including sling usage and ROM restrictions in order to reduce potential of dislocation.   -GJ (r) AB (t) GJ (c)     STG 1 Progress  Met  -GJ (r) AB (t) GJ (c)     STG 2  The pt will demonstrate R shoulder flex  PROM to at least 0-140  to facilitate improved functional reach and dressing.  -GJ (r) AB (t) GJ (c)     STG 2 Progress  Met  -GJ (r) AB (t) GJ (c)        Long Term Goals    LTG Date to Achieve  04/04/21  -GJ (r) AB (t) GJ (c)     LTG 1  The pt will report at least 75% improvement in dressing upper body to facilitate improved self-care ADL performance.  -GJ (r) AB (t) GJ (c)     LTG 1 Progress  Met  -GJ (r) AB (t) GJ (c)     LTG 2  The pt will demonstrate IND and compliant with progressive HEP focused on IND condition management and return to PLOF.  -GJ (r) AB (t) GJ (c)     LTG 2 Progress  Ongoing  -GJ (r) AB (t) GJ (c)     LTG 3  The pt will score less than or equal to 20% disability on the QuickDASH to indicate improved perceived performance of ADLs.  -GJ (r) AB (t) GJ (c)     LTG 3 Progress   Ongoing  -GJ (r) AB (t) GJ (c)     LTG 4  The pt will demonstrate R shoulder  AROM flex to at least 0-130 and Abduction to at least 0-120 to facilitate improved functional reach.  -GJ (r) AB (t) GJ (c)     LTG 4 Progress  Ongoing  -GJ (r) AB (t) GJ (c)     LTG 5  The pt will demonstrate R shoulder strength to at least 4-/5 for improved functional reach and household chore performance.  -GJ (r) AB (t) GJ (c)     LTG 5 Progress  Ongoing  -GJ (r) AB (t) GJ (c)     LTG 6  The pt will demonstrate FIR R shoulder to at least T10 for improved self-care ADL performance.  -GJ (r) AB (t) GJ (c)     LTG 6 Progress  Ongoing  -GJ (r) AB (t) GJ (c)       User Key  (r) = Recorded By, (t) = Taken By, (c) = Cosigned By    Initials Name Provider Type    Raj Jewell, PT Physical Therapist    Daren Thacker PT Student PT Student          Therapy Education  Education Details: Activity tolerance, HEP compliance  Given: HEP, Posture/body mechanics  Program: Reinforced  How Provided: Verbal  Provided to: Patient  Level of Understanding: Verbalized              Time Calculation:   Start Time: 0830  Stop Time: 0915  Time Calculation (min): 45 min  Total Timed Code Minutes- PT: 40 minute(s)  Therapy Charges for Today     Code Description Service Date Service Provider Modifiers Qty    77601827712  PT THER PROC EA 15 MIN 3/24/2021 Daren Rey PT Student GP 2    [id number]  PT MANUAL THERAPY EA 15 MIN 3/24/2021 Daren Rey PT Student GP 1                    ALLYN Desir  3/24/2021

## 2021-04-08 ENCOUNTER — HOSPITAL ENCOUNTER (OUTPATIENT)
Dept: PHYSICAL THERAPY | Facility: HOSPITAL | Age: 86
Setting detail: THERAPIES SERIES
Discharge: HOME | End: 2021-04-08

## 2021-04-08 DIAGNOSIS — M25.611 DECREASED RIGHT SHOULDER RANGE OF MOTION: ICD-10-CM

## 2021-04-08 DIAGNOSIS — Z47.89 ORTHOPEDIC AFTERCARE: ICD-10-CM

## 2021-04-08 DIAGNOSIS — M25.511 ACUTE PAIN OF RIGHT SHOULDER: Primary | ICD-10-CM

## 2021-04-08 PROCEDURE — 97140 MANUAL THERAPY 1/> REGIONS: CPT

## 2021-04-08 PROCEDURE — 97110 THERAPEUTIC EXERCISES: CPT

## 2021-04-08 NOTE — THERAPY TREATMENT NOTE
Outpatient Physical Therapy Ortho Treatment Note  [facility]     Patient Name: Josue Rothman  : 1935  MRN: [id number]  Today's Date: 2021      Visit Date: 2021    Visit Dx:    ICD-10-CM ICD-9-CM   1. Acute pain of right shoulder  M25.511 719.41   2. Orthopedic aftercare  Z47.89 V54.9   3. Decreased right shoulder range of motion  M25.611 719.51       Patient Active Problem List   Diagnosis   • Shoulder arthritis   • Status post reverse total replacement of left shoulder        Past Medical History:   Diagnosis Date   • History of kidney stones    • History of pneumonia    • History of transfusion     no reaction   • Hyperlipidemia    • Limited range of motion (ROM) of shoulder     right   • Osteoarthritis    • Paroxysmal atrial fibrillation (CMS/HCC)         Past Surgical History:   Procedure Laterality Date   • BACK SURGERY      RODS & SCREWS   • CATARACT EXTRACTION EXTRACAPSULAR W/ INTRAOCULAR LENS IMPLANTATION Bilateral    • COLONOSCOPY     • HERNIA REPAIR Bilateral     2x on left inguinal & 1 time on right inguinal   • KIDNEY STONE SURGERY     • TOTAL SHOULDER ARTHROPLASTY W/ DISTAL CLAVICLE EXCISION Left 9/10/2020    Procedure: TOTAL SHOULDER REVERSE ARTHROPLASTY;  Surgeon: Jayden Holbrook MD;  Location: [facility];  Service: Orthopedics;  Laterality: Left;   • TOTAL SHOULDER ARTHROPLASTY W/ DISTAL CLAVICLE EXCISION Right 2021    Procedure: Right Reverse Total Shoulder Arthroplasty;  Surgeon: Jayden Holbrook MD;  Location: [facility];  Service: Orthopedics;  Laterality: Right;                       PT Assessment/Plan     Row Name 21 1031          PT Assessment    Assessment Comments  Pt reports limited ROM on the R compared to his previous TSA on the L at this stage of recovery. Discussed normal healing times and reviewed current exercise program with cues for correct form with elevation tasks.  -CN        PT Plan    Predicted Duration of Therapy  Intervention (OT)  Continue to progress OH strengthening with emphasis on correct form. Review HEP for upcoming trip to [place].  -CN       User Key  (r) = Recorded By, (t) = Taken By, (c) = Cosigned By    Initials Name Provider Type    Sondra Joe, PT Physical Therapist            OP Exercises     Row Name 04/08/21 0800 04/08/21 0700          Subjective Comments    Subjective Comments  This shoulder just isn't recovering as quicly as the other one did. It is ok though.   -CN  --        Subjective Pain    Able to rate subjective pain?  yes  -CN  --     Pre-Treatment Pain Level  0  -CN  --        Total Minutes    37311 - PT Therapeutic Exercise Minutes  30  -CN  --     83358 - PT Manual Therapy Minutes  --  10  -CN        Exercise 1    Exercise Name 1  scap retract/shoulder ext   -CN  --     Cueing 1  Verbal;Demo  -CN  --     Sets 1  2  -CN  --     Reps 1  10  -CN  --     Additional Comments  GTB  -CN  --        Exercise 4    Exercise Name 4  AAROM flexion with dowel and self assist  -CN  --     Reps 4  10  -CN  --        Exercise 6    Exercise Name 6  Overhead pulleys  -CN  --     Reps 6  3 min  -CN  --     Additional Comments  flexion/scaption  -CN  --        Exercise 8    Exercise Name 8  chest press dowel  -CN  --     Reps 8  15  -CN  --     Additional Comments  5#  -CN  --        Exercise 9    Exercise Name 9   towel on mirror  -CN  --     Cueing 9  Verbal  -CN  --     Reps 9  15  -CN  --     Time 9  2 hands  -CN  --        Exercise 11    Exercise Name 11  UBE  -CN  --     Time 11  4min  -CN  --        Exercise 12    Exercise Name 12  SL shoulder flexion  -CN  --     Time 12  12  -CN  --        Exercise 13    Exercise Name 13  S/L ER 2#  -CN  --     Sets 13  2  -CN  --     Reps 13  10  -CN  --        Exercise 14    Exercise Name 14  Theraband IR/ER  -CN  --     Cueing 14  Verbal;Demo  -CN  --     Sets 14  2  -CN  --     Reps 14  10  -CN  --     Additional Comments  YTB walk outs  -CN  --         Exercise 15    Exercise Name 15  flexion/Scaption to 90 degrees (standing)  -CN  --     Cueing 15  Verbal;Demo  -CN  --     Reps 15  10  -CN  --        Exercise 16    Exercise Name 16  Supine RUE alphabet  -CN  --     Sets 16  2x  -CN  --        Exercise 17    Exercise Name 17  Supine SAP  -CN  --     Cueing 17  Verbal  -CN  --     Sets 17  2  -CN  --     Reps 17  10  -CN  --     Additional Comments  2#  -CN  --       User Key  (r) = Recorded By, (t) = Taken By, (c) = Cosigned By    Initials Name Provider Type    Sondra Joe, PT Physical Therapist                      Manual Rx (last 36 hours)      Manual Treatments     Row Name 04/08/21 0700             Total Minutes    44768 - PT Manual Therapy Minutes  10  -CN         Manual Rx 1    Manual Rx 1 Location  R shoulder PROM into flexion/ER  -CN      Manual Rx 1 Type  gentle oscillations/ distractions.   -CN         Manual Rx 2    Manual Rx 2 Location  Rhythmic stabilization-  degrees flex in supine  -CN        User Key  (r) = Recorded By, (t) = Taken By, (c) = Cosigned By    Initials Name Provider Type    Sondra Joe, PT Physical Therapist          PT OP Goals     Row Name 04/08/21 1000          PT Short Term Goals    STG Date to Achieve  02/24/21  -CN     STG 1  Pt will be compliant with precautions including sling usage and ROM restrictions in order to reduce potential of dislocation.   -CN     STG 1 Progress  Met  -CN     STG 2  The pt will demonstrate R shoulder flex  PROM to at least 0-140  to facilitate improved functional reach and dressing.  -CN     STG 2 Progress  Met  -CN        Long Term Goals    LTG Date to Achieve  04/04/21  -CN     LTG 1  The pt will report at least 75% improvement in dressing upper body to facilitate improved self-care ADL performance.  -CN     LTG 1 Progress  Met  -CN     LTG 2  The pt will demonstrate IND and compliant with progressive HEP focused on IND condition management and return to PLOF.   -CN     LTG 2 Progress  Ongoing  -CN     LTG 2 Progress Comments  Pt compliant with HEP, requires cues for correct form with elevation tasks.   -CN     LTG 3  The pt will score less than or equal to 20% disability on the QuickDASH to indicate improved perceived performance of ADLs.  -CN     LTG 3 Progress  Ongoing  -CN     LTG 4  The pt will demonstrate R shoulder  AROM flex to at least 0-130 and Abduction to at least 0-120 to facilitate improved functional reach.  -CN     LTG 4 Progress  Ongoing  -CN     LTG 5  The pt will demonstrate R shoulder strength to at least 4-/5 for improved functional reach and household chore performance.  -CN     LTG 5 Progress  Ongoing  -CN     LTG 6  The pt will demonstrate FIR R shoulder to at least T10 for improved self-care ADL performance.  -CN     LTG 6 Progress  Ongoing  -CN       User Key  (r) = Recorded By, (t) = Taken By, (c) = Cosigned By    Initials Name Provider Type    Sondra Joe, ALLYN Physical Therapist          Therapy Education  Given: HEP, Posture/body mechanics  Program: Reinforced  How Provided: Verbal  Provided to: Patient  Level of Understanding: Verbalized              Time Calculation:   Start Time: 0835  Stop Time: 0915  Time Calculation (min): 40 min  Therapy Charges for Today     Code Description Service Date Service Provider Modifiers Qty    70787975978  PT THER PROC EA 15 MIN 4/8/2021 Sondra Banks, PT GP 2    [id number]  PT MANUAL THERAPY EA 15 MIN 4/8/2021 Sondra Banks, ALLYN GP 1                    Sondra Banks PT  4/8/2021

## 2021-04-13 ENCOUNTER — HOSPITAL ENCOUNTER (OUTPATIENT)
Dept: PHYSICAL THERAPY | Facility: HOSPITAL | Age: 86
Setting detail: THERAPIES SERIES
Discharge: HOME | End: 2021-04-13

## 2021-04-13 DIAGNOSIS — M25.611 DECREASED RIGHT SHOULDER RANGE OF MOTION: ICD-10-CM

## 2021-04-13 DIAGNOSIS — Z47.89 ORTHOPEDIC AFTERCARE: ICD-10-CM

## 2021-04-13 DIAGNOSIS — M25.511 ACUTE PAIN OF RIGHT SHOULDER: Primary | ICD-10-CM

## 2021-04-13 PROCEDURE — 97110 THERAPEUTIC EXERCISES: CPT | Performed by: PHYSICAL THERAPIST

## 2021-04-13 NOTE — THERAPY PROGRESS REPORT/RE-CERT
Outpatient Physical Therapy Ortho Progress Note  [facility]     Patient Name: Josue Rothman  : 1935  MRN: [id number]  Today's Date: 2021      Visit Date: 2021    Visit Dx:    ICD-10-CM ICD-9-CM   1. Acute pain of right shoulder  M25.511 719.41   2. Orthopedic aftercare  Z47.89 V54.9   3. Decreased right shoulder range of motion  M25.611 719.51       Patient Active Problem List   Diagnosis   • Shoulder arthritis   • Status post reverse total replacement of left shoulder        Past Medical History:   Diagnosis Date   • History of kidney stones    • History of pneumonia    • History of transfusion     no reaction   • Hyperlipidemia    • Limited range of motion (ROM) of shoulder     right   • Osteoarthritis    • Paroxysmal atrial fibrillation (CMS/HCC)         Past Surgical History:   Procedure Laterality Date   • BACK SURGERY      RODS & SCREWS   • CATARACT EXTRACTION EXTRACAPSULAR W/ INTRAOCULAR LENS IMPLANTATION Bilateral    • COLONOSCOPY     • HERNIA REPAIR Bilateral     2x on left inguinal & 1 time on right inguinal   • KIDNEY STONE SURGERY     • TOTAL SHOULDER ARTHROPLASTY W/ DISTAL CLAVICLE EXCISION Left 9/10/2020    Procedure: TOTAL SHOULDER REVERSE ARTHROPLASTY;  Surgeon: Jayden Holbrook MD;  Location: [facility];  Service: Orthopedics;  Laterality: Left;   • TOTAL SHOULDER ARTHROPLASTY W/ DISTAL CLAVICLE EXCISION Right 2021    Procedure: Right Reverse Total Shoulder Arthroplasty;  Surgeon: Jayden Holbrook MD;  Location: [facility];  Service: Orthopedics;  Laterality: Right;       PT Ortho     Row Name 21 0900       Right Upper Ext    Rt Shoulder Abduction AROM  100 setaed  -GJ    Rt Shoulder Flexion AROM  120 seated  -GJ    Rt Shoulder Internal Rotation AROM  FIR midline L3/4, with effort  -GJ      User Key  (r) = Recorded By, (t) = Taken By, (c) = Cosigned By    Initials Name Provider Type    Raj Jewell, PT Physical Therapist                       PT Assessment/Plan     Row Name 04/13/21 0929          PT Assessment    Functional Limitations  Limitation in home management;Limitations in community activities;Performance in leisure activities;Performance in sport activities  -GJ     Impairments  Impaired flexibility;Impaired muscle length;Impaired muscle power;Impaired postural alignment;Muscle strength;Pain;Poor body mechanics;Posture;Range of motion  -GJ     Assessment Comments  Mr. Rothman is an 87 y/o R handed male. He is 13 weeks s/p R reverse TSA (1/14/2021), with hx of L reverse TSA in 9/2020. He has met all STG's. He has met 1 of 6 LTG's. Mr. Rothman is very motivated, and continues to progress toward all remaining funcctional goals. We discussed allowing time for tissue to heal and that he may be doing too much (in fuctional sense, starting leaf blower, lawn activities, etc).  overall he is doingn quite well given fact of having both shoulders operated on since 9l2020. He has 3 additioanl sessions remaining prior to likely DC to independent program.  -GJ     Please refer to paper survey for additional self-reported information  Yes  -GJ     Rehab Potential  Excellent  -GJ     Patient/caregiver participated in establishment of treatment plan and goals  Yes  -GJ     Patient would benefit from skilled therapy intervention  Yes  -GJ        PT Plan    PT Frequency  1x/week;2x/week  -GJ     Predicted Duration of Therapy Intervention (PT)  10 visits  -GJ     Planned CPT's?  PT RE-EVAL: 24269;PT THER PROC EA 15 MIN: 59777;PT THER ACT EA 15 MIN: 93374;PT MANUAL THERAPY EA 15 MIN: 71662;PT NEUROMUSC RE-EDUCATION EA 15 MIN: 92878;PT HOT OR COLD PACK TREAT MCARE  -GJ     PT Plan Comments  likely dc to independent management in 3 sessions (after 4/22), ensure independence/understanding of HEP, scapular girdle strengthening, ? HA/D2 either supine or standing, likely supine  -GJ       User Key  (r) = Recorded By, (t) = Taken By, (c) =  Cosigned By    Initials Name Provider Type    GJ Raj Ray, PT Physical Therapist            OP Exercises     Row Name 04/13/21 0921 04/13/21 0900          Subjective Comments    Subjective Comments  --  my shoulder is doing great. I am doing my exercises and will keep workingon them. I go to [place] at the end of  the month  -GJ        Total Minutes    91248 - PT Therapeutic Exercise Minutes  41  -GJ  --        Exercise 1    Exercise Name 1  --  scap retract/shoulder ext   -GJ     Cueing 1  --  Verbal;Demo  -GJ     Sets 1  --  2  -GJ     Reps 1  --  10  -GJ     Additional Comments  --  GTB  -GJ        Exercise 4    Exercise Name 4  --  AAROM flexion with dowel and self assist  -GJ     Cueing 4  --  Verbal;Demo  -GJ     Reps 4  --  10  -GJ     Additional Comments  --  standing   -GJ        Exercise 6    Exercise Name 6  --  Overhead pulleys  -GJ     Cueing 6  --  Verbal;Demo  -GJ     Reps 6  --  4 min  -GJ     Additional Comments  --  flexion/scaption  -GJ        Exercise 9    Exercise Name 9  --   towel on mirror  -GJ     Cueing 9  --  Verbal  -GJ     Reps 9  --  15  -GJ     Time 9  --  2 hands  -GJ        Exercise 11    Exercise Name 11  --  UBE  -GJ     Time 11  --  4min  -GJ        Exercise 12    Exercise Name 12  --  SL shoulder flexion  -GJ     Cueing 12  --  Verbal;Demo  -GJ     Time 12  --  15  -GJ     Additional Comments  --  1#  -GJ        Exercise 13    Exercise Name 13  --  S/L ER 2#  -GJ     Cueing 13  --  Verbal  -GJ     Sets 13  --  2  -GJ     Reps 13  --  10  -GJ     Additional Comments  --  2#  -GJ        Exercise 14    Exercise Name 14  --  Theraband IR/ER  -GJ     Cueing 14  --  Verbal;Demo  -GJ     Sets 14  --  --  -GJ     Reps 14  --  10, 3 steps out/3 steps in  -GJ     Additional Comments  --  YTB walk outs  -GJ        Exercise 15    Exercise Name 15  --  flexion/Scaption to 90 degrees (standing)  -GJ     Cueing 15  --  Verbal;Demo  -GJ     Reps 15  --  10  -GJ       User Key  (r) = Recorded  By, (t) = Taken By, (c) = Cosigned By    Initials Name Provider Type    Raj Jewell, PT Physical Therapist                       PT OP Goals     Row Name 04/13/21 0900          PT Short Term Goals    STG Date to Achieve  02/24/21  -GJ     STG 1  Pt will be compliant with precautions including sling usage and ROM restrictions in order to reduce potential of dislocation.   -GJ     STG 1 Progress  Met  -GJ     STG 2  The pt will demonstrate R shoulder flex  PROM to at least 0-140  to facilitate improved functional reach and dressing.  -GJ     STG 2 Progress  Met  -GJ        Long Term Goals    LTG Date to Achieve  06/04/21  -GJ     LTG 1  The pt will report at least 75% improvement in dressing upper body to facilitate improved self-care ADL performance.  -GJ     LTG 1 Progress  Met  -GJ     LTG 2  The pt will demonstrate IND and compliant with progressive HEP focused on IND condition management and return to PLOF.  -GJ     LTG 2 Progress  Ongoing;Progressing  -GJ     LTG 3  The pt will score less than or equal to 20% disability on the QuickDASH to indicate improved perceived performance of ADLs.  -GJ     LTG 3 Progress  Ongoing  -GJ     LTG 4  The pt will demonstrate R shoulder  AROM flex to at least 0-130 and Abduction to at least 0-120 to facilitate improved functional reach.  -GJ     LTG 4 Progress  Ongoing  -GJ     LTG 4 Progress Comments  see ortho  -GJ     LTG 5  The pt will demonstrate R shoulder strength to at least 4-/5 for improved functional reach and household chore performance.  -GJ     LTG 5 Progress  Ongoing  -GJ     LTG 6  The pt will demonstrate FIR R shoulder to at least T10 for improved self-care ADL performance.  -GJ     LTG 6 Progress  Ongoing  -GJ     LTG 6 Progress Comments  see ortho  -GJ       User Key  (r) = Recorded By, (t) = Taken By, (c) = Cosigned By    Initials Name Provider Type    Raj Jewell, PT Physical Therapist          Therapy Education  Education Details: discussed  activity modification and maybe he is doing too much and might benefit from some rest  Given: HEP, Symptoms/condition management, Pain management, Mobility training, Posture/body mechanics  Program: Reinforced  How Provided: Verbal, Demonstration  Provided to: Patient  Level of Understanding: Teach back education performed, Verbalized, Demonstrated    Outcome Measure Options: Quick DASH         Time Calculation:   Start Time: 0917  Stop Time: 1000  Time Calculation (min): 43 min  Therapy Charges for Today     Code Description Service Date Service Provider Modifiers Qty    23539265679 HC PT THER PROC EA 15 MIN 4/13/2021 Raj Ray, PT GP 3          PT G-Codes  Outcome Measure Options: Quick DASH         Raj Ray, PT  4/13/2021

## 2021-04-15 ENCOUNTER — HOSPITAL ENCOUNTER (OUTPATIENT)
Dept: PHYSICAL THERAPY | Facility: HOSPITAL | Age: 86
Setting detail: THERAPIES SERIES
Discharge: HOME | End: 2021-04-15

## 2021-04-15 DIAGNOSIS — Z47.89 ORTHOPEDIC AFTERCARE: ICD-10-CM

## 2021-04-15 DIAGNOSIS — M25.511 ACUTE PAIN OF RIGHT SHOULDER: Primary | ICD-10-CM

## 2021-04-15 DIAGNOSIS — M25.611 DECREASED RIGHT SHOULDER RANGE OF MOTION: ICD-10-CM

## 2021-04-15 PROCEDURE — 97110 THERAPEUTIC EXERCISES: CPT | Performed by: PHYSICAL THERAPIST

## 2021-04-15 NOTE — THERAPY TREATMENT NOTE
Outpatient Physical Therapy Ortho Treatment Note  [facility]     Patient Name: Josue Rothman  : 1935  MRN: [id number]  Today's Date: 4/15/2021      Visit Date: 04/15/2021    Visit Dx:    ICD-10-CM ICD-9-CM   1. Acute pain of right shoulder  M25.511 719.41   2. Orthopedic aftercare  Z47.89 V54.9   3. Decreased right shoulder range of motion  M25.611 719.51       Patient Active Problem List   Diagnosis   • Shoulder arthritis   • Status post reverse total replacement of left shoulder        Past Medical History:   Diagnosis Date   • History of kidney stones    • History of pneumonia    • History of transfusion     no reaction   • Hyperlipidemia    • Limited range of motion (ROM) of shoulder     right   • Osteoarthritis    • Paroxysmal atrial fibrillation (CMS/HCC)         Past Surgical History:   Procedure Laterality Date   • BACK SURGERY      RODS & SCREWS   • CATARACT EXTRACTION EXTRACAPSULAR W/ INTRAOCULAR LENS IMPLANTATION Bilateral    • COLONOSCOPY     • HERNIA REPAIR Bilateral     2x on left inguinal & 1 time on right inguinal   • KIDNEY STONE SURGERY     • TOTAL SHOULDER ARTHROPLASTY W/ DISTAL CLAVICLE EXCISION Left 9/10/2020    Procedure: TOTAL SHOULDER REVERSE ARTHROPLASTY;  Surgeon: Jayden Holbrook MD;  Location: [facility];  Service: Orthopedics;  Laterality: Left;   • TOTAL SHOULDER ARTHROPLASTY W/ DISTAL CLAVICLE EXCISION Right 2021    Procedure: Right Reverse Total Shoulder Arthroplasty;  Surgeon: Jayden Holbrook MD;  Location: [facility];  Service: Orthopedics;  Laterality: Right;                       PT Assessment/Plan     Row Name 04/15/21 0950          PT Assessment    Assessment Comments  Mr Rothman returns, no new complaints. He is 13 weeks s/p R reverse TSA (2021), with hx of L reverse TSA in 2020.  He has 2 additional sessoinis of physical therapy prior to likely DC to independent management. He is in agreement with this plan. We updated  HEP to include supine HA.  He continues to progress with functional mobility.  -GJ        PT Plan    PT Plan Comments  likely DC to HEP in 2 sessions.  ensure independence with HEP  -GJ       User Key  (r) = Recorded By, (t) = Taken By, (c) = Cosigned By    Initials Name Provider Type    Raj Jewell, PT Physical Therapist            OP Exercises     Row Name 04/15/21 0922 04/15/21 0900          Subjective Comments    Subjective Comments  --  doing pretty good, may hit some tennis today  -GJ        Total Minutes    05001 - PT Therapeutic Exercise Minutes  38  -GJ  --        Exercise 1    Exercise Name 1  --  scap retract/shoulder ext   -GJ     Cueing 1  --  Verbal;Demo  -GJ     Sets 1  --  2  -GJ     Reps 1  --  10  -GJ     Additional Comments  --  GTB  -GJ        Exercise 4    Exercise Name 4  --  AAROM flexion with dowel and self assist  -GJ     Cueing 4  --  Verbal;Demo  -GJ     Reps 4  --  12  -GJ     Additional Comments  --  standing  -GJ        Exercise 6    Exercise Name 6  --  Overhead pulleys  -GJ     Cueing 6  --  Verbal;Demo  -GJ     Reps 6  --  4 min  -GJ        Exercise 9    Exercise Name 9  --   towel on mirror  -GJ     Cueing 9  --  Verbal  -GJ     Reps 9  --  15  -GJ     Time 9  --  2 hands  -GJ        Exercise 11    Exercise Name 11  --  UBE  -GJ     Time 11  --  4min  -GJ        Exercise 12    Exercise Name 12  --  SL shoulder flexion  -GJ     Cueing 12  --  Verbal;Demo  -GJ     Time 12  --  15  -GJ     Additional Comments  --  1#  -GJ        Exercise 13    Exercise Name 13  --  S/L ER 2#  -GJ     Cueing 13  --  Verbal  -GJ     Sets 13  --  2  -GJ     Reps 13  --  10  -GJ     Additional Comments  --  2#  -GJ        Exercise 14    Exercise Name 14  --  Theraband IR/ER  -GJ     Cueing 14  --  Verbal;Demo  -GJ     Reps 14  --  10, 3 steps out/3 steps in  -GJ     Additional Comments  --  YTB walk outs  -GJ        Exercise 16    Exercise Name 16  --  Supine RUE alphabet  -GJ     Cueing 16  --   Verbal;Tactile  -GJ     Reps 16  --  1 A-Z  -GJ     Additional Comments  --  1#  -GJ        Exercise 17    Exercise Name 17  --  Supine SAP  -GJ     Cueing 17  --  Verbal  -GJ     Reps 17  --  15  -GJ     Additional Comments  --  3#  -GJ        Exercise 18    Exercise Name 18  --  supine HA  -GJ     Cueing 18  --  Verbal;Demo  -GJ     Reps 18  --  15  -GJ     Additional Comments  --  RTB  -GJ       User Key  (r) = Recorded By, (t) = Taken By, (c) = Cosigned By    Initials Name Provider Type    Raj Jewell, PT Physical Therapist                       PT OP Goals     Row Name 04/15/21 0900          PT Short Term Goals    STG Date to Achieve  02/24/21  -GJ     STG 1  Pt will be compliant with precautions including sling usage and ROM restrictions in order to reduce potential of dislocation.   -GJ     STG 1 Progress  Met  -GJ     STG 2  The pt will demonstrate R shoulder flex  PROM to at least 0-140  to facilitate improved functional reach and dressing.  -     STG 2 Progress  Met  -        Long Term Goals    LTG Date to Achieve  06/04/21  -GJ     LTG 1  The pt will report at least 75% improvement in dressing upper body to facilitate improved self-care ADL performance.  -GJ     LTG 1 Progress  Met  -GJ     LTG 2  The pt will demonstrate IND and compliant with progressive HEP focused on IND condition management and return to PLOF.  -GJ     LTG 2 Progress  Ongoing;Progressing  -GJ     LTG 3  The pt will score less than or equal to 20% disability on the QuickDASH to indicate improved perceived performance of ADLs.  -GJ     LTG 3 Progress  Ongoing  -GJ     LTG 4  The pt will demonstrate R shoulder  AROM flex to at least 0-130 and Abduction to at least 0-120 to facilitate improved functional reach.  -GJ     LTG 4 Progress  Ongoing  -GJ     LTG 5  The pt will demonstrate R shoulder strength to at least 4-/5 for improved functional reach and household chore performance.  -GJ     LTG 5 Progress  Ongoing  -GJ     LTG 6   The pt will demonstrate FIR R shoulder to at least T10 for improved self-care ADL performance.  -GJ     LTG 6 Progress  Ongoing  -GJ       User Key  (r) = Recorded By, (t) = Taken By, (c) = Cosigned By    Initials Name Provider Type    Raj Jewell, PT Physical Therapist          Therapy Education  Education Details: updated HEP  Given: HEP, Symptoms/condition management, Pain management, Mobility training, Posture/body mechanics  Program: Reinforced, New  How Provided: Verbal, Demonstration, Written  Provided to: Patient  Level of Understanding: Teach back education performed, Verbalized, Demonstrated              Time Calculation:   Start Time: 0922  Stop Time: 1000  Time Calculation (min): 38 min  Therapy Charges for Today     Code Description Service Date Service Provider Modifiers Qty    26248833449 HC PT THER PROC EA 15 MIN 4/15/2021 Raj Ray, PT GP 3                    Raj Ray, PT  4/15/2021

## 2021-04-20 ENCOUNTER — HOSPITAL ENCOUNTER (OUTPATIENT)
Dept: PHYSICAL THERAPY | Facility: HOSPITAL | Age: 86
Setting detail: THERAPIES SERIES
Discharge: HOME | End: 2021-04-20

## 2021-04-20 DIAGNOSIS — M25.611 DECREASED RIGHT SHOULDER RANGE OF MOTION: ICD-10-CM

## 2021-04-20 DIAGNOSIS — M25.512 ACUTE PAIN OF LEFT SHOULDER: ICD-10-CM

## 2021-04-20 DIAGNOSIS — Z47.89 ORTHOPEDIC AFTERCARE: ICD-10-CM

## 2021-04-20 DIAGNOSIS — M25.511 ACUTE PAIN OF RIGHT SHOULDER: Primary | ICD-10-CM

## 2021-04-20 PROCEDURE — 97110 THERAPEUTIC EXERCISES: CPT | Performed by: PHYSICAL THERAPIST

## 2021-04-20 NOTE — THERAPY TREATMENT NOTE
Outpatient Physical Therapy Ortho Treatment Note  [facility]     Patient Name: Josue Rothman  : 1935  MRN: [id number]  Today's Date: 2021      Visit Date: 2021    Visit Dx:    ICD-10-CM ICD-9-CM   1. Acute pain of right shoulder  M25.511 719.41   2. Orthopedic aftercare  Z47.89 V54.9   3. Decreased right shoulder range of motion  M25.611 719.51   4. Acute pain of left shoulder  M25.512 719.41       Patient Active Problem List   Diagnosis   • Shoulder arthritis   • Status post reverse total replacement of left shoulder        Past Medical History:   Diagnosis Date   • History of kidney stones    • History of pneumonia    • History of transfusion     no reaction   • Hyperlipidemia    • Limited range of motion (ROM) of shoulder     right   • Osteoarthritis    • Paroxysmal atrial fibrillation (CMS/HCC)         Past Surgical History:   Procedure Laterality Date   • BACK SURGERY      RODS & SCREWS   • CATARACT EXTRACTION EXTRACAPSULAR W/ INTRAOCULAR LENS IMPLANTATION Bilateral    • COLONOSCOPY     • HERNIA REPAIR Bilateral     2x on left inguinal & 1 time on right inguinal   • KIDNEY STONE SURGERY     • TOTAL SHOULDER ARTHROPLASTY W/ DISTAL CLAVICLE EXCISION Left 9/10/2020    Procedure: TOTAL SHOULDER REVERSE ARTHROPLASTY;  Surgeon: Jayden Holbrook MD;  Location: [facility];  Service: Orthopedics;  Laterality: Left;   • TOTAL SHOULDER ARTHROPLASTY W/ DISTAL CLAVICLE EXCISION Right 2021    Procedure: Right Reverse Total Shoulder Arthroplasty;  Surgeon: Jayden Holbrook MD;  Location: [facility];  Service: Orthopedics;  Laterality: Right;                       PT Assessment/Plan     Row Name 21 0935          PT Assessment    Assessment Comments  Mr. Chapin reports feeling like he has started to turn a corner in regards to his R shoudler. We reviewed his HEP, ensured he has all exercise pictures and bands for home.  He has one additional session  establilshed prior to likely DC to HEP.  -GJ        PT Plan    PT Plan Comments  DC to HEP after next session  -GJ       User Key  (r) = Recorded By, (t) = Taken By, (c) = Cosigned By    Initials Name Provider Type    Raj Jewell, PT Physical Therapist            OP Exercises     Row Name 04/20/21 0921 04/20/21 0900          Subjective Comments    Subjective Comments  --  no troubles with my shoulder, i cut grass yesterday and will cut it again today  -GJ        Total Minutes    03774 - PT Therapeutic Exercise Minutes  40  -GJ  --        Exercise 1    Exercise Name 1  --  scap retract/shoulder ext   -GJ     Cueing 1  --  Verbal;Demo  -GJ     Sets 1  --  2  -GJ     Reps 1  --  10  -GJ     Additional Comments  --  GTB  -GJ        Exercise 4    Exercise Name 4  --  AAROM flexion with dowel and self assist  -GJ     Cueing 4  --  Verbal;Demo  -GJ     Reps 4  --  12  -GJ     Additional Comments  --  standing  -GJ        Exercise 6    Exercise Name 6  --  Overhead pulleys  -GJ     Cueing 6  --  Verbal;Demo  -GJ     Reps 6  --  4 min  -GJ        Exercise 9    Exercise Name 9  --   towel on mirror  -GJ     Cueing 9  --  Verbal  -GJ     Reps 9  --  15  -GJ     Time 9  --  2 hands  -GJ        Exercise 11    Exercise Name 11  --  UBE  -GJ     Time 11  --  4min  -GJ        Exercise 12    Exercise Name 12  --  SL shoulder flexion  -GJ     Cueing 12  --  Verbal;Demo  -GJ     Time 12  --  15  -GJ     Additional Comments  --  1#  -GJ        Exercise 13    Exercise Name 13  --  S/L ER 2#  -GJ     Cueing 13  --  Verbal  -GJ     Sets 13  --  2  -GJ     Reps 13  --  10  -GJ     Additional Comments  --  2#  -GJ        Exercise 14    Exercise Name 14  --  Theraband IR/ER  -GJ     Cueing 14  --  Verbal;Demo  -GJ     Reps 14  --  10, 3 steps out/3 steps in  -GJ     Additional Comments  --  YTB walk outs  -GJ        Exercise 18    Exercise Name 18  --  supine HA  -GJ     Cueing 18  --  Verbal;Demo  -GJ     Reps 18  --  15  -GJ      Additional Comments  --  RTB  -GJ       User Key  (r) = Recorded By, (t) = Taken By, (c) = Cosigned By    Initials Name Provider Type    Raj Jewell, PT Physical Therapist                       PT OP Goals     Row Name 04/20/21 0900          PT Short Term Goals    STG Date to Achieve  02/24/21  -GJ     STG 1  Pt will be compliant with precautions including sling usage and ROM restrictions in order to reduce potential of dislocation.   -GJ     STG 1 Progress  Met  -GJ     STG 2  The pt will demonstrate R shoulder flex  PROM to at least 0-140  to facilitate improved functional reach and dressing.  -GJ     STG 2 Progress  Met  -GJ        Long Term Goals    LTG Date to Achieve  06/04/21  -GJ     LTG 1  The pt will report at least 75% improvement in dressing upper body to facilitate improved self-care ADL performance.  -GJ     LTG 1 Progress  Met  -GJ     LTG 2  The pt will demonstrate IND and compliant with progressive HEP focused on IND condition management and return to PLOF.  -GJ     LTG 2 Progress  Met  -GJ     LTG 3  The pt will score less than or equal to 20% disability on the QuickDASH to indicate improved perceived performance of ADLs.  -GJ     LTG 3 Progress  Ongoing  -GJ     LTG 4  The pt will demonstrate R shoulder  AROM flex to at least 0-130 and Abduction to at least 0-120 to facilitate improved functional reach.  -GJ     LTG 4 Progress  Ongoing  -GJ     LTG 5  The pt will demonstrate R shoulder strength to at least 4-/5 for improved functional reach and household chore performance.  -GJ     LTG 5 Progress  Ongoing  -GJ     LTG 6  The pt will demonstrate FIR R shoulder to at least T10 for improved self-care ADL performance.  -GJ     LTG 6 Progress  Ongoing  -GJ       User Key  (r) = Recorded By, (t) = Taken By, (c) = Cosigned By    Initials Name Provider Type    Raj Jewell, PT Physical Therapist          Therapy Education  Given: HEP, Symptoms/condition management, Pain management, Mobility  training, Posture/body mechanics  Program: Reinforced  How Provided: Verbal, Demonstration  Provided to: Patient  Level of Understanding: Teach back education performed, Verbalized, Demonstrated              Time Calculation:   Start Time: 0916  Stop Time: 0958  Time Calculation (min): 42 min  Therapy Charges for Today     Code Description Service Date Service Provider Modifiers Qty    86886809467 HC PT THER PROC EA 15 MIN 4/20/2021 Raj Ray, PT GP 3                    Raj Ray, PT  4/20/2021

## 2021-04-22 ENCOUNTER — HOSPITAL ENCOUNTER (OUTPATIENT)
Dept: PHYSICAL THERAPY | Facility: HOSPITAL | Age: 86
Setting detail: THERAPIES SERIES
Discharge: HOME | End: 2021-04-22

## 2021-04-22 DIAGNOSIS — M25.611 DECREASED RIGHT SHOULDER RANGE OF MOTION: ICD-10-CM

## 2021-04-22 DIAGNOSIS — Z47.89 ORTHOPEDIC AFTERCARE: ICD-10-CM

## 2021-04-22 DIAGNOSIS — M25.511 ACUTE PAIN OF RIGHT SHOULDER: Primary | ICD-10-CM

## 2021-04-22 PROCEDURE — 97110 THERAPEUTIC EXERCISES: CPT | Performed by: PHYSICAL THERAPIST

## 2021-04-22 NOTE — THERAPY DISCHARGE NOTE
Outpatient Physical Therapy Ortho Treatment Note/Discharge Summary  [facility]     Patient Name: Josue Rothman  : 1935  MRN: [id number]  Today's Date: 2021      Visit Date: 2021    Visit Dx:    ICD-10-CM ICD-9-CM   1. Acute pain of right shoulder  M25.511 719.41   2. Orthopedic aftercare  Z47.89 V54.9   3. Decreased right shoulder range of motion  M25.611 719.51       Patient Active Problem List   Diagnosis   • Shoulder arthritis   • Status post reverse total replacement of left shoulder        Past Medical History:   Diagnosis Date   • History of kidney stones    • History of pneumonia    • History of transfusion     no reaction   • Hyperlipidemia    • Limited range of motion (ROM) of shoulder     right   • Osteoarthritis    • Paroxysmal atrial fibrillation (CMS/HCC)         Past Surgical History:   Procedure Laterality Date   • BACK SURGERY      RODS & SCREWS   • CATARACT EXTRACTION EXTRACAPSULAR W/ INTRAOCULAR LENS IMPLANTATION Bilateral    • COLONOSCOPY     • HERNIA REPAIR Bilateral     2x on left inguinal & 1 time on right inguinal   • KIDNEY STONE SURGERY     • TOTAL SHOULDER ARTHROPLASTY W/ DISTAL CLAVICLE EXCISION Left 9/10/2020    Procedure: TOTAL SHOULDER REVERSE ARTHROPLASTY;  Surgeon: Jayden Holbrook MD;  Location: [facility];  Service: Orthopedics;  Laterality: Left;   • TOTAL SHOULDER ARTHROPLASTY W/ DISTAL CLAVICLE EXCISION Right 2021    Procedure: Right Reverse Total Shoulder Arthroplasty;  Surgeon: Jayden Holbrook MD;  Location: [facility];  Service: Orthopedics;  Laterality: Right;       PT Ortho     Row Name 21 0900       Right Upper Ext    Rt Shoulder Abduction AROM  105 sitting  -GJ    Rt Shoulder Flexion AROM  120 sitting  -GJ    Rt Shoulder Internal Rotation AROM  FIR midlinne belt line, with effort  -GJ      User Key  (r) = Recorded By, (t) = Taken By, (c) = Cosigned By    Initials Name Provider Type    Raj Jewell W, PT  Physical Therapist                      PT Assessment/Plan     Row Name 04/22/21 0931          PT Assessment    Assessment Comments  Mr. Tobar attended 20 sessions of physical therapy from 2/3/2021-4/22/2021 following his R reverse TSA. He is independent with his HEP, verbalizes an excellent understanding of his condition. See ortho for updated ROM.  Mr. Tobar is ready to practice independent management. He is discharged from outpatient physical therapy to an independent program. He is encouraged to continue HEP 2-3 x;s per. He is encouraged to call with questions.  -GJ        PT Plan    PT Plan Comments  DC to HEP  -GJ       User Key  (r) = Recorded By, (t) = Taken By, (c) = Cosigned By    Initials Name Provider Type    Raj Jewell, PT Physical Therapist              OP Exercises     Row Name 04/22/21 0921 04/22/21 0900          Subjective Comments    Subjective Comments  --  I'm doign good  -GJ        Total Minutes    41825 - PT Therapeutic Exercise Minutes  40  -GJ  --        Exercise 1    Exercise Name 1  --  scap retract/shoulder ext   -GJ     Cueing 1  --  Verbal;Demo  -GJ     Sets 1  --  2  -GJ     Reps 1  --  10  -GJ     Additional Comments  --  GTB  -GJ        Exercise 4    Exercise Name 4  --  AAROM flexion with dowel and self assist  -GJ     Cueing 4  --  Verbal;Demo  -GJ     Reps 4  --  12  -GJ     Additional Comments  --  standing  -GJ        Exercise 6    Exercise Name 6  --  Overhead pulleys  -GJ     Cueing 6  --  Verbal;Demo  -GJ     Reps 6  --  4 min  -GJ        Exercise 9    Exercise Name 9  --   towel on mirror  -GJ     Cueing 9  --  Verbal  -GJ     Reps 9  --  15  -GJ     Time 9  --  2 hands  -GJ        Exercise 11    Exercise Name 11  --  UBE  -GJ     Time 11  --  4min  -GJ        Exercise 12    Exercise Name 12  --  SL shoulder flexion  -GJ     Cueing 12  --  Verbal;Demo  -GJ     Time 12  --  15  -GJ     Additional Comments  --  1#  -GJ        Exercise 13    Exercise Name 13   --  S/L ER 2#  -GJ     Cueing 13  --  Verbal  -GJ     Sets 13  --  2  -GJ     Reps 13  --  10  -GJ     Additional Comments  --  2#  -GJ        Exercise 14    Exercise Name 14  --  Theraband IR/ER  -GJ     Cueing 14  --  Verbal;Demo  -GJ     Reps 14  --  10, 3 steps out/3 steps in  -GJ     Additional Comments  --  YTB walk outs  -GJ        Exercise 18    Exercise Name 18  --  supine HA  -GJ     Cueing 18  --  Verbal;Demo  -GJ     Reps 18  --  15  -GJ     Additional Comments  --  RTB  -GJ       User Key  (r) = Recorded By, (t) = Taken By, (c) = Cosigned By    Initials Name Provider Type    Raj Jewell, PT Physical Therapist                         PT OP Goals     Row Name 04/22/21 0900          PT Short Term Goals    STG Date to Achieve  02/24/21  -     STG 1  Pt will be compliant with precautions including sling usage and ROM restrictions in order to reduce potential of dislocation.   -     STG 1 Progress  Met  -GJ     STG 2  The pt will demonstrate R shoulder flex  PROM to at least 0-140  to facilitate improved functional reach and dressing.  -     STG 2 Progress  Met  -        Long Term Goals    LTG Date to Achieve  06/04/21  -     LTG 1  The pt will report at least 75% improvement in dressing upper body to facilitate improved self-care ADL performance.  -     LTG 1 Progress  Met  -GJ     LTG 2  The pt will demonstrate IND and compliant with progressive HEP focused on IND condition management and return to PLOF.  -GJ     LTG 2 Progress  Met  -GJ     LTG 3  The pt will score less than or equal to 20% disability on the QuickDASH to indicate improved perceived performance of ADLs.  -GJ     LTG 3 Progress  Not Met  -GJ     LTG 4  The pt will demonstrate R shoulder  AROM flex to at least 0-130 and Abduction to at least 0-120 to facilitate improved functional reach.  -GJ     LTG 4 Progress  Not Met  -GJ     LTG 5  The pt will demonstrate R shoulder strength to at least 4-/5 for improved functional  reach and household chore performance.  -     LTG 5 Progress  Partially Met  -     LTG 6  The pt will demonstrate FIR R shoulder to at least T10 for improved self-care ADL performance.  -     LTG 6 Progress  Not Met  -       User Key  (r) = Recorded By, (t) = Taken By, (c) = Cosigned By    Initials Name Provider Type    Raj Jewell, PT Physical Therapist          Therapy Education  Education Details: HEP 2-3 x's per week, call with questions, reviewed activity modifications  Given: HEP, Symptoms/condition management, Pain management, Mobility training, Posture/body mechanics  Program: Reinforced  How Provided: Verbal  Provided to: Patient  Level of Understanding: Verbalized              Time Calculation:   Start Time: 0918  Stop Time: 1000  Time Calculation (min): 42 min  Therapy Charges for Today     Code Description Service Date Service Provider Modifiers Qty    55408896467  PT THER PROC EA 15 MIN 4/22/2021 Raj Ray, PT GP 3                OP PT Discharge Summary  Date of Discharge: 04/22/21  Reason for Discharge: Independent  Outcomes Achieved: Patient able to partially acheive established goals  Discharge Destination: Home with home program  Discharge Instructions/Additional Comments: HEP 2-3 x's per week, call with questions      Raj Ray, PT  4/22/2021

## 2021-06-02 ENCOUNTER — OFFICE VISIT (OUTPATIENT)
Dept: ORTHOPEDIC SURGERY | Facility: CLINIC | Age: 86
End: 2021-06-02

## 2021-06-02 VITALS — HEIGHT: 69 IN | WEIGHT: 179 LBS | TEMPERATURE: 96.1 F | BODY MASS INDEX: 26.51 KG/M2

## 2021-06-02 DIAGNOSIS — M19.019 SHOULDER ARTHRITIS: Primary | ICD-10-CM

## 2021-06-02 PROCEDURE — 73030 X-RAY EXAM OF SHOULDER: CPT | Performed by: ORTHOPAEDIC SURGERY

## 2021-06-02 PROCEDURE — 99212 OFFICE O/P EST SF 10 MIN: CPT | Performed by: ORTHOPAEDIC SURGERY

## 2021-06-02 NOTE — PROGRESS NOTES
"Josue Rothman : 1935 MRN: [id number] DATE: 2021    DIAGNOSIS: 4 month follow up right shoulder arthroplasty      SUBJECTIVE:  Patient returns today for 3 month follow up of right shoulder replacement.  Patient reports doing well with no unusual complaints.  He has transition to a home exercise program for the PT.    OBJECTIVE:    Temp 96.1 °F (35.6 °C)   Ht 175.3 cm (69\")   Wt 81.2 kg (179 lb)   BMI 26.43 kg/m²     Review of Systems negative except as above    Exam:  The incision is well healed. No effusion.  Range of motion:   degrees.  ER 40 degrees.  IR to between his side and back pocket. The arm is soft and nontender.  Good strength with elevation and abduction.  Sensation intact distally.  Brisk cap refill.    DIAGNOSTIC STUDIES    Xrays: AP, scapular Y and axillary views of the right shoulder are ordered and reviewed for evaluation of shoulder replacement. They demonstrate a well positioned, well aligned replacement without complicating factors noted.  In comparison with previous films there has been no change.    ASSESSMENT: 3 month follow up right shoulder replacement.    PLAN:   1.  Continue appropriate activity modifications and restrictions as discussed.  Antibiotic prophylaxis recommendations were discussed as well.  2.  Continue home exercise program  3.  I explained that one can expect continued improvement in motion, function, and any residual discomfort for up to 1 year after surgery.  4.  Follow up at 1 year unless experiencing any new or concerning symptoms.    Jayden Holbrook MD    2021     "

## 2021-06-04 ENCOUNTER — OFFICE VISIT (OUTPATIENT)
Dept: ORTHOPEDIC SURGERY | Facility: CLINIC | Age: 86
End: 2021-06-04

## 2021-06-04 VITALS — WEIGHT: 183 LBS | HEIGHT: 69 IN | TEMPERATURE: 97.8 F | BODY MASS INDEX: 27.11 KG/M2

## 2021-06-04 DIAGNOSIS — M17.12 PRIMARY OSTEOARTHRITIS OF LEFT KNEE: Primary | ICD-10-CM

## 2021-06-04 PROCEDURE — 20610 DRAIN/INJ JOINT/BURSA W/O US: CPT | Performed by: NURSE PRACTITIONER

## 2021-06-04 RX ORDER — METHYLPREDNISOLONE ACETATE 80 MG/ML
80 INJECTION, SUSPENSION INTRA-ARTICULAR; INTRALESIONAL; INTRAMUSCULAR; SOFT TISSUE
Status: COMPLETED | OUTPATIENT
Start: 2021-06-04 | End: 2021-06-04

## 2021-06-04 RX ADMIN — METHYLPREDNISOLONE ACETATE 80 MG: 80 INJECTION, SUSPENSION INTRA-ARTICULAR; INTRALESIONAL; INTRAMUSCULAR; SOFT TISSUE at 13:29

## 2021-06-04 NOTE — PROGRESS NOTES
Mr. Rothman comes in today for follow-up.  Injections have worked well in the past.  The patient would like to get a repeat injection today.  The risks, benefits and alternatives were discussed and the patient consented.  Going forward, the patient will follow-up as needed.    NICOLE Harman    06/04/2021      Large Joint Arthrocentesis: L knee  Date/Time: 6/4/2021 1:29 PM  Consent given by: patient  Site marked: site marked  Timeout: Immediately prior to procedure a time out was called to verify the correct patient, procedure, equipment, support staff and site/side marked as required   Supporting Documentation  Indications: pain   Procedure Details  Location: knee - L knee  Preparation: Patient was prepped and draped in the usual sterile fashion  Needle gauge: 21G.  Approach: anterolateral  Medications administered: 2 mL lidocaine (cardiac); 80 mg methylPREDNISolone acetate 80 MG/ML  Patient tolerance: patient tolerated the procedure well with no immediate complications

## 2021-06-10 RX ORDER — CEPHALEXIN 500 MG/1
500 CAPSULE ORAL EVERY 6 HOURS
Qty: 40 CAPSULE | Refills: 0 | Status: SHIPPED | OUTPATIENT
Start: 2021-06-10

## 2021-06-10 NOTE — TELEPHONE ENCOUNTER
Provider: DR SPARROW    Caller: EBER DYER    Relationship to Patient: SELF    Pharm[facility]: [facility] [address] [place]    Phone Number: [phone]    Reason for Call: PT NEEDS RX FOR ANTIBIOTICS, HAVING A DENTAL PROCEDURE ON THE 23RD.

## 2021-06-18 NOTE — PROGRESS NOTES
Patient: Josue Rothman    YOB: 1935    Medical Record Number: [id number]    Chief Complaints:  Left knee pain    History of Present Illness:     85 y.o. male patient who presents for evaluation of left knee pain.  He reports that the symptoms first began 5-6 months ago, but have worsened significantly since his shoulder replacement surgery nearly 2 weeks ago.  Current pain is described as severe, intermittent, and aching.  The pain is predominantly along the medial side of the knee.  Denies any clicking, popping or catching.  Symptoms are worse with activity.  Symptoms are somewhat better with rest and ice.  Denies any shooting pain down the legs, weakness, numbness or paresthesias.    Of note, he reports his shoulder is doing well.  His pain is minimal at this point.      Allergies:   Allergies   Allergen Reactions   • Penicillins Rash     Home Medications    Current Outpatient Medications:   •  aspirin 81 MG EC tablet, Take 81 mg by mouth Every 3 (Three) Days., Disp: , Rfl:   •  atorvastatin (LIPITOR) 40 MG tablet, Take 20 mg by mouth Every Night., Disp: , Rfl:   •  celecoxib (CeleBREX) 200 MG capsule, Take 200 mg by mouth Daily., Disp: , Rfl:   •  co-enzyme Q-10 30 MG capsule, Take 30 mg by mouth Daily. HOLD PRIOR TO SURGERY, Disp: , Rfl:   •  Cod Liver Oil capsule, Take 1 capsule by mouth Daily. HOLD PRIOR TO SURGERY, Disp: , Rfl:   •  dilTIAZem XR (Dilt-XR) 120 MG 24 hr capsule, Take 120 mg by mouth 2 (two) times a day., Disp: , Rfl:   •  docusate sodium 100 MG capsule, Take 100 mg by mouth 2 (Two) Times a Day As Needed (Constipation)., Disp: , Rfl:   •  ELIQUIS 5 MG tablet tablet, Take 5 mg by mouth Every Evening. HOLD PRIOR TO SURGERY 09-, Disp: , Rfl:   •  finasteride (PROSCAR) 5 MG tablet, Take 5 mg by mouth Daily., Disp: , Rfl:   •  gabapentin (NEURONTIN) 300 MG capsule, Take 300 mg by mouth Every Evening., Disp: , Rfl:   •  Ginger, Zingiber officinalis, (GINGER ROOT PO), Take  1 tablet by mouth Daily., Disp: , Rfl:   •  Glucosamine-Chondroit-Vit C-Mn (GLUCOSAMINE 1500 COMPLEX PO), Take 1 tablet by mouth Daily., Disp: , Rfl:   •  HYDROcodone-acetaminophen (NORCO) 7.5-325 MG per tablet, Take 1-2 tablets by mouth every 4 to 6 hours as needed for pain.  Not to exceed 6 tablets per day., Disp: 42 tablet, Rfl: 0  •  ondansetron (ZOFRAN) 4 MG tablet, Take 1 tablet by mouth Every 6 (Six) Hours As Needed for Nausea or Vomiting., Disp: 12 tablet, Rfl: 0  •  Pyridoxine HCl (B-6 PO), Take 1 tablet by mouth Daily., Disp: , Rfl:   •  tamsulosin (FLOMAX) 0.4 MG capsule 24 hr capsule, Take 1 capsule by mouth Every Other Day., Disp: , Rfl:   No current facility-administered medications for this visit.     Facility-Administered Medications Ordered in Other Visits:   •  mupirocin (BACTROBAN) 2 % nasal ointment 3 application, 3 application, Nasal, BID, Jayden Holbrook MD    Past Medical History:   Diagnosis Date   • Afib ([facility]) 12/02/2018   • Arthritis     OSTEO   • Elevated cholesterol    • Hyperlipidemia    • Left shoulder pain    • Limited range of motion (ROM) of shoulder    • Renal disorder        Past Surgical History:   Procedure Laterality Date   • BACK SURGERY  2009    RODS & SCREWS   • HERNIA REPAIR     • KIDNEY STONE SURGERY     • TOTAL SHOULDER ARTHROPLASTY W/ DISTAL CLAVICLE EXCISION Left 9/10/2020    Procedure: TOTAL SHOULDER REVERSE ARTHROPLASTY;  Surgeon: Jayden Holbrook MD;  Location: [facility];  Service: Orthopedics;  Laterality: Left;       Social History     Occupational History   • Not on file   Tobacco Use   • Smoking status: Never Smoker   • Smokeless tobacco: Never Used   Substance and Sexual Activity   • Alcohol use: Yes     Alcohol/week: 3.0 standard drinks     Types: 1 Glasses of wine, 1 Cans of beer, 1 Shots of liquor per week     Comment: VERY SELDOM   • Drug use: Defer   • Sexual activity: Defer      Social History     Social History Narrative   • Not on file  "      Family History   Problem Relation Age of Onset   • Malig Hyperthermia Neg Hx      Review of Systems:      Constitutional: Denies fever, shaking or chills   Eyes: Denies change in visual acuity   HEENT: Denies nasal congestion or sore throat   Respiratory: Denies cough or shortness of breath   Cardiovascular: Denies chest pain or edema  Endocrine: Denies tremors, palpitations, intolerance of heat or cold, polyuria, polydipsia.  GI: Denies abdominal pain, nausea, vomiting, bloody stools or diarrhea  : Denies frequency, urgency, incontinence, retention, or nocturia.  Musculoskeletal: Denies numbness, tingling or loss of motor function except as above  Integument: Denies rash, lesion or ulceration   Neurologic: Denies headache or focal weakness, deficits  Heme: Denies spontaneous or excessive bleeding, epistaxis, hematuria, melena, fatigue, enlarged or tender lymph nodes.      All other pertinent positives and negatives as noted above in HPI.    Physical Exam:   85 y.o. male  Vitals:    09/21/20 0913   Temp: 97.6 °F (36.4 °C)   Weight: 81.6 kg (180 lb)   Height: 175.3 cm (69\")     General:  Patient is awake and alert.  Appears in no acute distress or discomfort.    Psych:  Affect and demeanor are appropriate.    Eyes:  Conjunctiva and sclera appear grossly normal.  Eyes track well and EOM seem to be intact.  Ears:  No gross abnormalities.  Hearing adequate for the exam.    Cardiovascular:  Regular rate and rhythm.    Lungs:  Good chest expansion.  Breathing unlabored.    Spine:  Back appears grossly normal.  No palpable masses or adenopathy.  Good motion.  Straight leg raise and crossed straight leg raise maneuver are both negative for lower leg and/or knee pain.    Extremities:  Left knee is examined.  Skin is benign.  No obvious gross abnormalities.  No palpable masses or adenopathy.  Moderate tenderness noted over medial joint line.  Motion is to 115° of flexion, full extension.  No instability.  Positive " medial Manas's.  Strength is well preserved including hip flexion, knee extension, ankle and toe plantarflexion, ankle inversion and eversion.  Good sensory function throughout the leg and foot.  Palpable pulses.  Brisk capillary refill.  Good skin turgor.         Radiology:   Dr. Holbrook and I reviewed the x-rays together.  Bilateral standing AP views, bilateral merchants views and a lateral view of the left knee are ordered by myself and reviewed to evaluate the patient's complaint.  No comparison films are immediately available.  The x-rays show significant medial compartment degenerative arthritis bilaterally including joint space narrowing, osteophyte formation, and subchondral sclerosis.  There is    Assessment/Plan:   Left knee osteoarthritis with meniscal chondrocalcinosis    We discussed treatment options in detail including the risks, benefits, and alternatives of conservative treatment versus surgical options.  Regarding conservative treatment, we discussed appropriate activity modifications, anti-inflammatories, injections (including both corticosteroids and visco supplementation), and physical therapy.  We also discussed the option of an arthroplasty and all that would entail.  I have recommended that we start with a conservative approach and the patient agrees.    The patient has acknowledged understanding of the information and elected for an injection.  The risk, benefits and alternatives were discussed.  The patient consented and the injection was performed as described below.  Going forward, he we will follow-up as needed.    NICOLE Harman    09/21/2020    CC to Fredo Melvin MD      Large Joint Arthrocentesis: L knee  Date/Time: 9/21/2020 9:43 AM  Consent given by: patient  Site marked: site marked  Timeout: Immediately prior to procedure a time out was called to verify the correct patient, procedure, equipment, support staff and site/side marked as required   Supporting  Documentation  Indications: pain and joint swelling   Procedure Details  Location: knee - L knee  Preparation: Patient was prepped and draped in the usual sterile fashion  Needle gauge: 21G.  Approach: anterolateral  Medications administered: 2 mL lidocaine (cardiac); 80 mg methylPREDNISolone acetate 80 MG/ML  Patient tolerance: patient tolerated the procedure well with no immediate complications           No

## 2021-12-06 ENCOUNTER — OFFICE VISIT (OUTPATIENT)
Dept: ORTHOPEDIC SURGERY | Facility: CLINIC | Age: 86
End: 2021-12-06

## 2021-12-06 VITALS — BODY MASS INDEX: 26.5 KG/M2 | HEIGHT: 70 IN | WEIGHT: 185.1 LBS | TEMPERATURE: 97.3 F

## 2021-12-06 DIAGNOSIS — M17.12 PRIMARY OSTEOARTHRITIS OF LEFT KNEE: ICD-10-CM

## 2021-12-06 DIAGNOSIS — M11.262 CHONDROCALCINOSIS OF LEFT KNEE: ICD-10-CM

## 2021-12-06 DIAGNOSIS — Z98.890 HISTORY OF REVERSE TOTAL REPLACEMENT OF RIGHT SHOULDER JOINT: Primary | ICD-10-CM

## 2021-12-06 PROCEDURE — 73562 X-RAY EXAM OF KNEE 3: CPT | Performed by: NURSE PRACTITIONER

## 2021-12-06 PROCEDURE — 73030 X-RAY EXAM OF SHOULDER: CPT | Performed by: NURSE PRACTITIONER

## 2021-12-06 PROCEDURE — 20610 DRAIN/INJ JOINT/BURSA W/O US: CPT | Performed by: NURSE PRACTITIONER

## 2021-12-06 RX ORDER — METHYLPREDNISOLONE ACETATE 80 MG/ML
80 INJECTION, SUSPENSION INTRA-ARTICULAR; INTRALESIONAL; INTRAMUSCULAR; SOFT TISSUE
Status: COMPLETED | OUTPATIENT
Start: 2021-12-06 | End: 2021-12-06

## 2021-12-06 RX ADMIN — METHYLPREDNISOLONE ACETATE 80 MG: 80 INJECTION, SUSPENSION INTRA-ARTICULAR; INTRALESIONAL; INTRAMUSCULAR; SOFT TISSUE at 08:24

## 2022-04-29 ENCOUNTER — TELEPHONE (OUTPATIENT)
Dept: ORTHOPEDIC SURGERY | Facility: CLINIC | Age: 87
End: 2022-04-29

## 2022-04-29 NOTE — TELEPHONE ENCOUNTER
Caller: KYUNG DYER     Relationship to patient: SELF    Best call back number: [phone]    Patient is needing: PATIENT WOULD LIKE TO SCHEDULE BILATERAL KNEE INJECTIONS

## 2022-05-09 ENCOUNTER — OFFICE VISIT (OUTPATIENT)
Dept: ORTHOPEDIC SURGERY | Facility: CLINIC | Age: 87
End: 2022-05-09

## 2022-05-09 VITALS — WEIGHT: 170.1 LBS | BODY MASS INDEX: 25.2 KG/M2 | HEIGHT: 69 IN | TEMPERATURE: 96.9 F

## 2022-05-09 DIAGNOSIS — M11.261 CHONDROCALCINOSIS OF RIGHT KNEE: ICD-10-CM

## 2022-05-09 DIAGNOSIS — M17.11 PRIMARY OSTEOARTHRITIS OF RIGHT KNEE: Primary | ICD-10-CM

## 2022-05-09 DIAGNOSIS — M11.262 CHONDROCALCINOSIS OF LEFT KNEE: ICD-10-CM

## 2022-05-09 DIAGNOSIS — M17.12 PRIMARY OSTEOARTHRITIS OF LEFT KNEE: ICD-10-CM

## 2022-05-09 PROCEDURE — 20610 DRAIN/INJ JOINT/BURSA W/O US: CPT | Performed by: NURSE PRACTITIONER

## 2022-05-09 PROCEDURE — 73562 X-RAY EXAM OF KNEE 3: CPT | Performed by: NURSE PRACTITIONER

## 2022-05-09 PROCEDURE — 99213 OFFICE O/P EST LOW 20 MIN: CPT | Performed by: NURSE PRACTITIONER

## 2022-05-09 RX ORDER — LIDOCAINE HYDROCHLORIDE 20 MG/ML
2 INJECTION, SOLUTION EPIDURAL; INFILTRATION; INTRACAUDAL; PERINEURAL
Status: COMPLETED | OUTPATIENT
Start: 2022-05-09 | End: 2022-05-09

## 2022-05-09 RX ORDER — METHYLPREDNISOLONE ACETATE 80 MG/ML
80 INJECTION, SUSPENSION INTRA-ARTICULAR; INTRALESIONAL; INTRAMUSCULAR; SOFT TISSUE
Status: COMPLETED | OUTPATIENT
Start: 2022-05-09 | End: 2022-05-09

## 2022-05-09 RX ADMIN — LIDOCAINE HYDROCHLORIDE 2 ML: 20 INJECTION, SOLUTION EPIDURAL; INFILTRATION; INTRACAUDAL; PERINEURAL at 09:51

## 2022-05-09 RX ADMIN — LIDOCAINE HYDROCHLORIDE 2 ML: 20 INJECTION, SOLUTION EPIDURAL; INFILTRATION; INTRACAUDAL; PERINEURAL at 09:20

## 2022-05-09 RX ADMIN — METHYLPREDNISOLONE ACETATE 80 MG: 80 INJECTION, SUSPENSION INTRA-ARTICULAR; INTRALESIONAL; INTRAMUSCULAR; SOFT TISSUE at 09:20

## 2022-05-09 RX ADMIN — METHYLPREDNISOLONE ACETATE 80 MG: 80 INJECTION, SUSPENSION INTRA-ARTICULAR; INTRALESIONAL; INTRAMUSCULAR; SOFT TISSUE at 09:51

## 2022-05-09 NOTE — PROGRESS NOTES
Patient: Josue Rothman    YOB: 1935    Medical Record Number: [id number]    Chief Complaints:  Right knee pain    History of Present Illness:     87 y.o. male patient who presents for evaluation of right knee pain.  He reports that the symptoms first began many years ago and have progressively worsened.  Describes his current pain as described as moderate, constant, and aching.  Reports intermittent shooting pain.  The pain is predominantly along the medial side of the knee.  Denies any clicking, popping or catching.  Symptoms are worse with activity.  Symptoms are somewhat better with rest, anti-inflammatories, and intermittent use of a cane.  Denies any weakness, numbness or paresthesias.    Reports his left knee pain has persisted.  Cortisone injections have worked well in the past.  He is interested in getting a repeat injection today.      Allergies:   Allergies   Allergen Reactions   • Penicillins Rash       Home Medications    Current Outpatient Medications:   •  aspirin 81 MG EC tablet, Take 81 mg by mouth Every 3 (Three) Days. Pt to stop before surgery, Disp: , Rfl:   •  atorvastatin (LIPITOR) 40 MG tablet, Take 20 mg by mouth Every Night., Disp: , Rfl:   •  azithromycin (Zithromax Z-Hector) 250 MG tablet, Take 2 tablets at the same time Day 1 and then 1 tablet every 24 hour thereafter., Disp: 6 tablet, Rfl: 0  •  celecoxib (CeleBREX) 200 MG capsule, Take 200 mg by mouth Daily., Disp: , Rfl:   •  cephalexin (KEFLEX) 500 MG capsule, Take 1 capsule by mouth Every 6 (Six) Hours., Disp: 40 capsule, Rfl: 0  •  Cod Liver Oil capsule, Take 1 capsule by mouth Daily. HOLD PRIOR TO SURGERY, Disp: , Rfl:   •  coenzyme Q10 100 MG capsule, Take 100 mg by mouth Daily., Disp: , Rfl:   •  dilTIAZem XR (Dilt-XR) 120 MG 24 hr capsule, Take 120 mg by mouth Every Morning., Disp: , Rfl:   •  ELIQUIS 5 MG tablet tablet, Take 5 mg by mouth Every 12 (Twelve) Hours. HOLD PRIOR TO SURGERY, Disp: , Rfl:   •   finasteride (PROSCAR) 5 MG tablet, Take 5 mg by mouth Daily., Disp: , Rfl:   •  gabapentin (NEURONTIN) 300 MG capsule, Take 300 mg by mouth Every Night., Disp: , Rfl:   •  Pyridoxine HCl (B-6 PO), Take 100 mg by mouth Daily., Disp: , Rfl:   •  sennosides-docusate (PERICOLACE) 8.6-50 MG per tablet, Take 2 tablets by mouth 2 (Two) Times a Day., Disp: 60 tablet, Rfl: 0  •  tamsulosin (FLOMAX) 0.4 MG capsule 24 hr capsule, Take 1 capsule by mouth Daily., Disp: , Rfl:   No current facility-administered medications for this visit.    Facility-Administered Medications Ordered in Other Visits:   •  Chlorhexidine Gluconate 2 % pads 1 each, 1 each, Apply externally, Take As Directed, Jayden Holbrook MD  •  mupirocin (BACTROBAN) 2 % nasal ointment 3 application, 3 application, Nasal, BID, Jayden Holbrook MD    Past Medical History:   Diagnosis Date   • History of kidney stones    • History of pneumonia    • History of transfusion 1952    no reaction   • Hyperlipidemia    • Limited range of motion (ROM) of shoulder     right   • Osteoarthritis    • Paroxysmal atrial fibrillation (HCC)        Past Surgical History:   Procedure Laterality Date   • BACK SURGERY  2009    RODS & SCREWS   • CATARACT EXTRACTION EXTRACAPSULAR W/ INTRAOCULAR LENS IMPLANTATION Bilateral    • COLONOSCOPY     • HERNIA REPAIR Bilateral     2x on left inguinal & 1 time on right inguinal   • KIDNEY STONE SURGERY     • TOTAL SHOULDER ARTHROPLASTY W/ DISTAL CLAVICLE EXCISION Left 9/10/2020    Procedure: TOTAL SHOULDER REVERSE ARTHROPLASTY;  Surgeon: Jayden Holbrook MD;  Location: [facility];  Service: Orthopedics;  Laterality: Left;   • TOTAL SHOULDER ARTHROPLASTY W/ DISTAL CLAVICLE EXCISION Right 1/14/2021    Procedure: Right Reverse Total Shoulder Arthroplasty;  Surgeon: Jayden Holbrook MD;  Location: [facility];  Service: Orthopedics;  Laterality: Right;       Social History     Occupational History   • Not on file   Tobacco Use   • Smoking  "status: Never Smoker   • Smokeless tobacco: Never Used   Vaping Use   • Vaping Use: Never used   Substance and Sexual Activity   • Alcohol use: Yes     Alcohol/week: 3.0 standard drinks     Types: 1 Glasses of wine, 1 Cans of beer, 1 Shots of liquor per week   • Drug use: Never   • Sexual activity: Defer      Social History     Social History Narrative   • Not on file       Family History   Problem Relation Age of Onset   • Malig Hyperthermia Neg Hx        Review of Systems:      Constitutional: Denies fever, shaking or chills   Eyes: Denies change in visual acuity   HEENT: Denies nasal congestion or sore throat   Respiratory: Denies cough or shortness of breath   Cardiovascular: Denies chest pain or edema  Endocrine: Denies tremors, palpitations, intolerance of heat or cold, polyuria, polydipsia.  GI: Denies abdominal pain, nausea, vomiting, bloody stools or diarrhea  : Denies frequency, urgency, incontinence, retention, or nocturia.  Musculoskeletal: Denies numbness, tingling or loss of motor function except as above  Integument: Denies rash, lesion or ulceration   Neurologic: Denies headache or focal weakness, deficits  Heme: Denies spontaneous or excessive bleeding, epistaxis, hematuria, melena, fatigue, enlarged or tender lymph nodes.      All other pertinent positives and negatives as noted above in HPI.    Physical Exam:   87 y.o. male  Vitals:    05/09/22 0919   Temp: 96.9 °F (36.1 °C)   Weight: 77.2 kg (170 lb 1.6 oz)   Height: 175.3 cm (69\")     General:  Patient is awake and alert.  Appears in no acute distress or discomfort.    Psych:  Affect and demeanor are appropriate.    Eyes:  Conjunctiva and sclera appear grossly normal.  Eyes track well and EOM seem to be intact.    Ears:  No gross abnormalities.  Hearing adequate for the exam.    Cardiovascular:  Regular rate and rhythm.    Lungs:  Good chest expansion.  Breathing unlabored.    Spine:  Back appears grossly normal.  No palpable masses or " adenopathy.  Good motion.  Straight leg raise and crossed straight leg raise maneuver are both negative for lower leg and/or knee pain.    Extremities:  Right knee is examined.  Skin is benign.  No obvious gross abnormalities.  No palpable masses or adenopathy.  Moderate tenderness noted over medial joint line.  Motion is to 120° of flexion, full extension.  No instability.  Strength is well preserved including hip flexion, knee extension, ankle and toe plantarflexion, ankle inversion and eversion.  Good sensory function throughout the leg and foot.  Palpable pulses.  Brisk capillary refill.  Good skin turgor.         Radiology:   Bilateral standing AP views, bilateral merchants views and a lateral view of the right knee are ordered by myself and reviewed to evaluate the patient's complaint.  No comparison films are immediately available.  The x-rays show end stage degenerative arthritis including bone on bone degeneration, osteophyte and subchondral sclerosis.  The majority of the degenerative changes appear to involve the medial compartment.  There are calcium deposits noted in the medial and lateral meniscus.     Assessment/Plan:  1. Right knee osteoarthritis with meniscal chondrocalcinosis  2.  Left knee osteoarthritis with meniscal chondrocalcinosis    We discussed treatment options in detail including the risks, benefits, and alternatives of conservative treatment versus surgical options.  Regarding conservative treatment, we discussed appropriate activity modifications, anti-inflammatories, injections (including both corticosteroids and visco supplementation), and physical therapy.  We also discussed the option of an arthroplasty and all that would entail.  I have recommended that we start with a conservative approach and the patient agrees.    The patient has acknowledged understanding of the information and elected for injections.  The risk, benefits and alternatives were discussed.  The patient consented  and the injections were performed as described below.  Going forward, he we will follow-up as needed.    Nohemy BONG Perez, APRN    05/09/2022    CC to Fredo Melvin MD      Large Joint Arthrocentesis: L knee  Date/Time: 5/9/2022 9:20 AM  Consent given by: patient  Site marked: site marked  Timeout: Immediately prior to procedure a time out was called to verify the correct patient, procedure, equipment, support staff and site/side marked as required   Supporting Documentation  Indications: pain and joint swelling   Procedure Details  Location: knee - L knee  Preparation: Patient was prepped and draped in the usual sterile fashion  Needle gauge: 21G.  Approach: anterolateral  Medications administered: 80 mg methylPREDNISolone acetate 80 MG/ML; 2 mL lidocaine PF 2% 2 %  Patient tolerance: patient tolerated the procedure well with no immediate complications    Large Joint Arthrocentesis: R knee  Date/Time: 5/9/2022 9:51 AM  Consent given by: patient  Site marked: site marked  Timeout: Immediately prior to procedure a time out was called to verify the correct patient, procedure, equipment, support staff and site/side marked as required   Supporting Documentation  Indications: pain   Procedure Details  Location: knee - R knee  Preparation: Patient was prepped and draped in the usual sterile fashion  Needle gauge: 21G.  Approach: anterolateral  Medications administered: 80 mg methylPREDNISolone acetate 80 MG/ML; 2 mL lidocaine PF 2% 2 %  Patient tolerance: patient tolerated the procedure well with no immediate complications

## 2022-06-04 ENCOUNTER — TELEPHONE ENCOUNTER (OUTPATIENT)
Dept: URBAN - METROPOLITAN AREA CLINIC 68 | Facility: CLINIC | Age: 87
End: 2022-06-04

## 2022-06-05 ENCOUNTER — TELEPHONE ENCOUNTER (OUTPATIENT)
Dept: URBAN - METROPOLITAN AREA CLINIC 68 | Facility: CLINIC | Age: 87
End: 2022-06-05

## 2022-06-05 RX ORDER — DIPHENHYDRAMINE HCL, IBUPROFEN 25; 200 MG/1; MG/1
ADVIL PM( 200-25MG ORAL   ) ACTIVE -HX ENTRY CAPSULE, LIQUID FILLED ORAL
Status: ACTIVE | COMMUNITY
Start: 2017-06-09

## 2022-06-05 RX ORDER — ATORVASTATIN CALCIUM 20 MG/1
ATORVASTATIN CALCIUM( 20MG ORAL   ) ACTIVE -HX ENTRY TABLET, FILM COATED ORAL
Status: ACTIVE | COMMUNITY
Start: 2017-06-09

## 2022-06-05 RX ORDER — CELECOXIB 200 MG/1
CELEBREX( 200MG ORAL   ) ACTIVE -HX ENTRY CAPSULE ORAL
Status: ACTIVE | COMMUNITY
Start: 2017-06-09

## 2022-06-05 RX ORDER — TAMSULOSIN HYDROCHLORIDE 0.4 MG/1
FLOMAX( 0.4MG ORAL   ) ACTIVE -HX ENTRY CAPSULE ORAL
Status: ACTIVE | COMMUNITY
Start: 2017-06-09

## 2022-06-25 ENCOUNTER — TELEPHONE ENCOUNTER (OUTPATIENT)
Age: 87
End: 2022-06-25

## 2022-06-26 ENCOUNTER — TELEPHONE ENCOUNTER (OUTPATIENT)
Age: 87
End: 2022-06-26

## 2022-06-26 RX ORDER — ATORVASTATIN CALCIUM 20 MG/1
ATORVASTATIN CALCIUM( 20MG ORAL   ) ACTIVE -HX ENTRY TABLET, FILM COATED ORAL
Status: ACTIVE | COMMUNITY
Start: 2017-06-09

## 2022-06-26 RX ORDER — TAMSULOSIN HCL 0.4 MG
FLOMAX( 0.4MG ORAL   ) ACTIVE -HX ENTRY CAPSULE ORAL
Status: ACTIVE | COMMUNITY
Start: 2017-06-09

## 2022-06-26 RX ORDER — DIPHENHYDRAMINE HCL, IBUPROFEN 25; 200 MG/1; MG/1
ADVIL PM( 200-25MG ORAL   ) ACTIVE -HX ENTRY CAPSULE, LIQUID FILLED ORAL
Status: ACTIVE | COMMUNITY
Start: 2017-06-09

## 2022-06-26 RX ORDER — CELECOXIB 200 MG
CELEBREX( 200MG ORAL   ) ACTIVE -HX ENTRY CAPSULE ORAL
Status: ACTIVE | COMMUNITY
Start: 2017-06-09

## 2022-07-25 NOTE — TELEPHONE ENCOUNTER
Provider: KYARA  Caller: KYUNG DYER  Relationship to Patient: SELF    Phone Number: [phone]  Reason for Call: PT IS HAVING RIGHT SHOULDER SURGERY 01/14/21 AND IS SUPPOSE TO BE GETTING THE COVID VACCINE AROUND THAT TIME. PT WANTS TO KNOW IF IT MATTERS WHICH ARE HE GETS THE VACCINE?     Second voicemail left, asked for patient to call when ready to schedule Right side.

## 2022-08-29 NOTE — THERAPY TREATMENT NOTE
Outpatient Physical Therapy Ortho Treatment Note  [facility]     Patient Name: Josue Rothman  : 1935  MRN: [id number]  Today's Date: 3/11/2021      Visit Date: 2021    Visit Dx:    ICD-10-CM ICD-9-CM   1. Acute pain of right shoulder  M25.511 719.41   2. Orthopedic aftercare  Z47.89 V54.9   3. Decreased right shoulder range of motion  M25.611 719.51       Patient Active Problem List   Diagnosis   • Shoulder arthritis   • Status post reverse total replacement of left shoulder        Past Medical History:   Diagnosis Date   • History of kidney stones    • History of pneumonia    • History of transfusion     no reaction   • Hyperlipidemia    • Limited range of motion (ROM) of shoulder     right   • Osteoarthritis    • Paroxysmal atrial fibrillation (CMS/HCC)         Past Surgical History:   Procedure Laterality Date   • BACK SURGERY      RODS & SCREWS   • CATARACT EXTRACTION EXTRACAPSULAR W/ INTRAOCULAR LENS IMPLANTATION Bilateral    • COLONOSCOPY     • HERNIA REPAIR Bilateral     2x on left inguinal & 1 time on right inguinal   • KIDNEY STONE SURGERY     • TOTAL SHOULDER ARTHROPLASTY W/ DISTAL CLAVICLE EXCISION Left 9/10/2020    Procedure: TOTAL SHOULDER REVERSE ARTHROPLASTY;  Surgeon: Jayden Holbrook MD;  Location: [facility];  Service: Orthopedics;  Laterality: Left;   • TOTAL SHOULDER ARTHROPLASTY W/ DISTAL CLAVICLE EXCISION Right 2021    Procedure: Right Reverse Total Shoulder Arthroplasty;  Surgeon: Jayden Holbrook MD;  Location: [facility];  Service: Orthopedics;  Laterality: Right;                       PT Assessment/Plan     Row Name 21 0806          PT Assessment    Assessment Comments  Patient s/p 8 weeks rev TSA. Added S/L ER. Continue to work on increasing ROM/strength. Cuing on mirror to step forward to increase flexion  -CORI       User Key  (r) = Recorded By, (t) = Taken By, (c) = Cosigned By    Initials Name Provider Type    Silvano Hinson  WAGNER Physical Therapy Assistant            OP Exercises     Row Name 03/11/21 0730 03/11/21 0700          Subjective Comments    Subjective Comments  no pain R shoulder  -WS  --        Total Minutes    80036 - PT Therapeutic Exercise Minutes  25  -WS  --     86299 - PT Manual Therapy Minutes  --  15  -WS        Exercise 1    Exercise Name 1  scap retract/shoulder ext   -WS  --     Cueing 1  Verbal;Demo  -WS  --     Reps 1  20ea  -WS  --     Additional Comments  RTB  -WS  --        Exercise 2    Exercise Name 2  biceps curl  -WS  --     Sets 2  2  -WS  --     Reps 2  10   -WS  --     Additional Comments  2#  -WS  --        Exercise 4    Exercise Name 4  AAROM flexion with dowel and self assist  -WS  --     Reps 4  10  -WS  --     Additional Comments  2# supine  -WS  --        Exercise 5    Exercise Name 5  5 way isometrics  -WS  --     Reps 5  10  -WS  --     Time 5  5 sec  -WS  --        Exercise 6    Exercise Name 6  Overhead pulleys  -WS  --     Reps 6  3 min  -WS  --        Exercise 8    Exercise Name 8  chest press dowel  -WS  --     Reps 8  15  -WS  --     Additional Comments  2#  -WS  --        Exercise 9    Exercise Name 9   towel on mirror  -WS  --     Cueing 9  Verbal;Demo  -WS  --     Reps 9  15  -WS  --     Time 9  2 hands  -WS  --     Additional Comments  w/ step for  -WS  --        Exercise 10    Exercise Name 10  Sidelying scapular retraction, emphasizing low trap  -WS  --     Cueing 10  Verbal;Tactile  -WS  --     Reps 10  10  -WS  --        Exercise 11    Exercise Name 11  UBE  -WS  --     Time 11  4min  -WS  --        Exercise 12    Exercise Name 12  SL shoulder flexion  -WS  --     Time 12  12  -WS  --        Exercise 13    Exercise Name 13  S/L ER 1##  -WS  --     Reps 13  10  -WS  --       User Key  (r) = Recorded By, (t) = Taken By, (c) = Cosigned By    Initials Name Provider Type    Silvano Hinson PTA Physical Therapy Assistant                      Manual Rx (last 36 hours)      Manual  Treatments     Row Name 03/11/21 0700             Total Minutes    97854 - PT Manual Therapy Minutes  15  -         Manual Rx 1    Manual Rx 1 Location  R shoulder PROM into flexion/ER  -      Manual Rx 1 Type  gentle oscillations/ distractions.  GH inf & post glides  -         Manual Rx 2    Manual Rx 2 Location  Rhythmic stabilization- 110 degrees flex in supine  -      Manual Rx 2 Type  ER/IR  -        User Key  (r) = Recorded By, (t) = Taken By, (c) = Cosigned By    Initials Name Provider Type     Silvano Wiggins PTA Physical Therapy Assistant          PT OP Goals     Row Name 03/11/21 0800          PT Short Term Goals    STG Date to Achieve  02/24/21  -     STG 1  Pt will be compliant with precautions including sling usage and ROM restrictions in order to reduce potential of dislocation.   -     STG 1 Progress  Met  -     STG 2  The pt will demonstrate R shoulder flex  PROM to at least 0-140  to facilitate improved functional reach and dressing.  -     STG 2 Progress  Met  -        Long Term Goals    LTG Date to Achieve  04/04/21  -     LTG 1  The pt will report at least 75% improvement in dressing upper body to facilitate improved self-care ADL performance.  -     LTG 1 Progress  Met  -     LTG 2  The pt will demonstrate IND and compliant with progressive HEP focused on IND condition management and return to PLOF.  -     LTG 2 Progress  Ongoing  -     LTG 3  The pt will score less than or equal to 20% disability on the QuickDASH to indicate improved perceived performance of ADLs.  -     LTG 3 Progress  Ongoing  -     LTG 4  The pt will demonstrate R shoulder  AROM flex to at least 0-130 and Abduction to at least 0-120 to facilitate improved functional reach.  -     LTG 4 Progress  Ongoing  -     LTG 5  The pt will demonstrate R shoulder strength to at least 4-/5 for improved functional reach and household chore performance.  -     LTG 5 Progress  Ongoing  -      LTG 6  The pt will demonstrate FIR R shoulder to at least T10 for improved self-care ADL performance.  -     LTG 6 Progress  Ongoing  -       User Key  (r) = Recorded By, (t) = Taken By, (c) = Cosigned By    Initials Name Provider Type    Silvano Hinson PTA Physical Therapy Assistant                         Time Calculation:   Start Time: 0730  Stop Time: 0810  Time Calculation (min): 40 min  Therapy Charges for Today     Code Description Service Date Service Provider Modifiers Qty    [id number]  PT THER PROC EA 15 MIN 3/11/2021 Silvano Wiggins PTA GP 2    [id number]  PT MANUAL THERAPY EA 15 MIN 3/11/2021 Silvano Wiggins PTA GP 1                    Silvano Wiggins PTA  3/11/2021      Complex Repair And Dermal Autograft Text: The defect edges were debeveled with a #15 scalpel blade.  The primary defect was closed partially with a complex linear closure.  Given the location of the defect, shape of the defect and the proximity to free margins an dermal autograft was deemed most appropriate to repair the remaining defect.  The graft was trimmed to fit the size of the remaining defect.  The graft was then placed in the primary defect, oriented appropriately, and sutured into place.

## 2023-07-31 ENCOUNTER — TELEPHONE (OUTPATIENT)
Dept: ORTHOPEDIC SURGERY | Facility: CLINIC | Age: 88
End: 2023-07-31

## 2023-07-31 NOTE — TELEPHONE ENCOUNTER
"    Caller: Josue Rothman \"Chava\"    Relationship to patient: Self    Best call back number: [phone]    Patient is needing: PATIENT CALLED TO CANCEL FOLLOW UP AND TO INFORMED THAT HE IS DOING BETTER AND HE DOES NOT NEED REHAB ANYMORE.   "

## 2023-12-13 ENCOUNTER — TELEPHONE (OUTPATIENT)
Dept: ORTHOPEDIC SURGERY | Facility: CLINIC | Age: 88
End: 2023-12-13
Payer: MEDICARE

## 2023-12-13 NOTE — TELEPHONE ENCOUNTER
"Caller: Josue Rothman \"Chava\"    Relationship to patient: Self    Best call back number: [phone] (home)     Patient is needing: PATIENT IS BEING SENT TO A NEW PROVIDER FOR HIS KNEES DUE TO INSURANCE AND HE NEEDS A DISC AND REPORT FOR THE XRAYS OF HIS KNEES.   HE HAS AN APPT WITH THE NEW PROVIDER TOMORROW (12/14/23)   PLEASE ADVISE PATIENT ON IF/WHEN HE CAN PICK THOSE UP.     "

## 2023-12-13 NOTE — TELEPHONE ENCOUNTER
Can one of your put his xray's on a disc Please.   Edith can you please print his visit notes for him. Thank you all

## 2024-05-13 ENCOUNTER — OFFICE VISIT (OUTPATIENT)
Dept: ORTHOPEDIC SURGERY | Facility: CLINIC | Age: 89
End: 2024-05-13
Payer: MEDICARE

## 2024-05-13 VITALS — BODY MASS INDEX: 25 KG/M2 | HEIGHT: 69 IN | TEMPERATURE: 98.7 F

## 2024-05-13 DIAGNOSIS — M17.0 PRIMARY OSTEOARTHRITIS OF BOTH KNEES: Primary | ICD-10-CM

## 2024-05-13 PROCEDURE — 1160F RVW MEDS BY RX/DR IN RCRD: CPT | Performed by: NURSE PRACTITIONER

## 2024-05-13 PROCEDURE — 73562 X-RAY EXAM OF KNEE 3: CPT | Performed by: NURSE PRACTITIONER

## 2024-05-13 PROCEDURE — 99213 OFFICE O/P EST LOW 20 MIN: CPT | Performed by: NURSE PRACTITIONER

## 2024-05-13 PROCEDURE — 1159F MED LIST DOCD IN RCRD: CPT | Performed by: NURSE PRACTITIONER

## 2024-05-13 PROCEDURE — 20610 DRAIN/INJ JOINT/BURSA W/O US: CPT | Performed by: NURSE PRACTITIONER

## 2024-05-13 RX ORDER — LIDOCAINE HYDROCHLORIDE 10 MG/ML
2 INJECTION, SOLUTION EPIDURAL; INFILTRATION; INTRACAUDAL; PERINEURAL
Status: COMPLETED | OUTPATIENT
Start: 2024-05-13 | End: 2024-05-13

## 2024-05-13 RX ORDER — FLUOROURACIL 50 MG/G
1 CREAM TOPICAL 2 TIMES DAILY
COMMUNITY

## 2024-05-13 RX ORDER — METHYLPREDNISOLONE ACETATE 80 MG/ML
80 INJECTION, SUSPENSION INTRA-ARTICULAR; INTRALESIONAL; INTRAMUSCULAR; SOFT TISSUE
Status: COMPLETED | OUTPATIENT
Start: 2024-05-13 | End: 2024-05-13

## 2024-05-13 RX ORDER — VITAMIN B COMPLEX
CAPSULE ORAL DAILY
COMMUNITY

## 2024-05-13 RX ADMIN — LIDOCAINE HYDROCHLORIDE 2 ML: 10 INJECTION, SOLUTION EPIDURAL; INFILTRATION; INTRACAUDAL; PERINEURAL at 08:45

## 2024-05-13 RX ADMIN — METHYLPREDNISOLONE ACETATE 80 MG: 80 INJECTION, SUSPENSION INTRA-ARTICULAR; INTRALESIONAL; INTRAMUSCULAR; SOFT TISSUE at 08:45

## 2024-05-13 NOTE — PROGRESS NOTES
"CC:   Bilateral knee osteoarthritis    SUBJECTIVE:  Patient returns today for follow up regarding bilateral knee osteoarthritis.  Continues to complain of pain and dysfunction.  Symptoms have responded well to injections in the past.     OBJECTIVE:    Vitals:    05/13/24 0844   Temp: 98.7 °F (37.1 °C)   Height: 175.3 cm (69\")         Exam:.     General:  Patient is awake and alert.  No acute distress.  Affect and demeanor appropriate.    Extremities:  Bilateral knees are examined.  Skin is benign.  No atrophy, swellings, or masses.  Focal tenderness along the medial joint line.  There is a trace effusion.  Knee motion is from 0-120°.  No evident instability.  Good strength with hip flexion, knee extension, ankle and great toe plantar flexion and dorsiflexion.  Sensation is intact distally.  Brisk capillary refill in the toes.  Palpable pedal pulses.  Good skin turgor.      DIAGNOSTIC STUDIES    Xrays:  Bilateral standing AP views, bilateral merchants views and a lateral view of both knees are ordered by myself and reviewed to evaluate the patient's complaint.  These are compared to previous x-rays.  The x-rays show end stage degenerative arthritis including bone on bone degeneration, malalignment, osteophyte and subchondral sclerosis.  Calcium deposits noted in the menisci bilaterally.  There is a chondral defect noted in the left medial femoral condyle which appears stable compared to previous x-rays. The majority of the degenerative changes appear to involve the medial and patellofemoral compartment.    ASSESSMENT:  Bilateral knee end-stage osteoarthritis    PLAN:  We discussed treatment options in detail.  He is not interested in pursuing surgical intervention.  Given the previous success with injections, I recommended that we repeat those.  The risks, benefits, and alternatives were discussed and the patient consented to proceed.    Nohemy Perez, APRN  05/13/2024      Large Joint Arthrocentesis: R " knee  Date/Time: 5/13/2024 8:45 AM  Consent given by: patient  Site marked: site marked  Timeout: Immediately prior to procedure a time out was called to verify the correct patient, procedure, equipment, support staff and site/side marked as required   Supporting Documentation  Indications: pain   Procedure Details  Location: knee - R knee  Preparation: Patient was prepped and draped in the usual sterile fashion  Needle gauge: 21 G.  Approach: anterolateral  Medications administered: 2 mL lidocaine PF 1% 1 %; 80 mg methylPREDNISolone acetate 80 MG/ML  Patient tolerance: patient tolerated the procedure well with no immediate complications      Large Joint Arthrocentesis: L knee  Date/Time: 5/13/2024 8:45 AM  Consent given by: patient  Site marked: site marked  Timeout: Immediately prior to procedure a time out was called to verify the correct patient, procedure, equipment, support staff and site/side marked as required   Supporting Documentation  Indications: pain   Procedure Details  Location: knee - L knee  Preparation: Patient was prepped and draped in the usual sterile fashion  Needle gauge: 21 G.  Approach: anterolateral  Medications administered: 2 mL lidocaine PF 1% 1 %; 80 mg methylPREDNISolone acetate 80 MG/ML  Patient tolerance: patient tolerated the procedure well with no immediate complications

## (undated) DEVICE — 3M™ IOBAN™ 2 ANTIMICROBIAL INCISE DRAPE 6640EZ: Brand: IOBAN™ 2

## (undated) DEVICE — APPL CHLORAPREP HI/LITE 26ML ORNG

## (undated) DEVICE — GLV SURG SIGNATURE ESSENTIAL PF LTX SZ8.5

## (undated) DEVICE — GLV SURG BIOGEL LTX PF 8 1/2

## (undated) DEVICE — ARM SLING: Brand: DEROYAL

## (undated) DEVICE — DRAPE,U/ SHT,SPLIT,PLAS,STERIL: Brand: MEDLINE

## (undated) DEVICE — SKIN PREP TRAY W/CHG: Brand: MEDLINE INDUSTRIES, INC.

## (undated) DEVICE — PIN STNMAN 3.2MM 9IN
Type: IMPLANTABLE DEVICE | Site: SHOULDER | Status: NON-FUNCTIONAL
Removed: 2021-01-14

## (undated) DEVICE — ADHS SKIN DERMABOND TOP ADVANCED

## (undated) DEVICE — BIT DRL SCRW PERIPH 2.7MM

## (undated) DEVICE — SOL ISO/ALC RUB 70PCT 4OZ

## (undated) DEVICE — MAT FLR ABSORBENT LG 4FT 10 2.5FT

## (undated) DEVICE — HANDPIECE SET WITH COAXIAL HIGH FLOW TIP AND SUCTION TUBE: Brand: INTERPULSE

## (undated) DEVICE — SUT VIC 2/0 CT2 27IN J269H

## (undated) DEVICE — DUAL CUT SAGITTAL BLADE

## (undated) DEVICE — PENCL E/S ULTRAVAC TELESCP NOSE HOLSTR 10FT

## (undated) DEVICE — PREP SOL POVIDONE/IODINE BT 4OZ

## (undated) DEVICE — PK SHLDR OPN 40

## (undated) DEVICE — IMMOB SHLDR COTN/PLY W/STRAP LG

## (undated) DEVICE — DRSNG TELFA PAD NONADH STR 1S 3X8IN

## (undated) DEVICE — TRAP FLD MINIVAC MEGADYNE 100ML

## (undated) DEVICE — POOLE SUCTION HANDLE: Brand: CARDINAL HEALTH

## (undated) DEVICE — DRSNG SURESITE WNDW 4X4.5

## (undated) DEVICE — PIN STNMAN 3.2MM 9IN
Type: IMPLANTABLE DEVICE | Site: SHOULDER | Status: NON-FUNCTIONAL
Removed: 2020-09-10

## (undated) DEVICE — SUT SILK 2/0 TIES 18IN A185H